# Patient Record
Sex: FEMALE | Race: BLACK OR AFRICAN AMERICAN | Employment: OTHER | URBAN - METROPOLITAN AREA
[De-identification: names, ages, dates, MRNs, and addresses within clinical notes are randomized per-mention and may not be internally consistent; named-entity substitution may affect disease eponyms.]

---

## 2017-01-23 ENCOUNTER — OFFICE VISIT (OUTPATIENT)
Dept: ENDOCRINOLOGY | Age: 82
End: 2017-01-23

## 2017-01-23 VITALS
HEART RATE: 57 BPM | DIASTOLIC BLOOD PRESSURE: 74 MMHG | HEIGHT: 66 IN | WEIGHT: 188.3 LBS | BODY MASS INDEX: 30.26 KG/M2 | SYSTOLIC BLOOD PRESSURE: 167 MMHG

## 2017-01-23 DIAGNOSIS — I10 ESSENTIAL HYPERTENSION WITH GOAL BLOOD PRESSURE LESS THAN 130/80: ICD-10-CM

## 2017-01-23 DIAGNOSIS — E78.5 HYPERLIPIDEMIA, UNSPECIFIED HYPERLIPIDEMIA TYPE: ICD-10-CM

## 2017-01-23 DIAGNOSIS — Z79.4 TYPE 2 DIABETES MELLITUS WITH STAGE 4 CHRONIC KIDNEY DISEASE, WITH LONG-TERM CURRENT USE OF INSULIN (HCC): Primary | ICD-10-CM

## 2017-01-23 DIAGNOSIS — N18.4 TYPE 2 DIABETES MELLITUS WITH STAGE 4 CHRONIC KIDNEY DISEASE, WITH LONG-TERM CURRENT USE OF INSULIN (HCC): Primary | ICD-10-CM

## 2017-01-23 DIAGNOSIS — E11.22 TYPE 2 DIABETES MELLITUS WITH STAGE 4 CHRONIC KIDNEY DISEASE, WITH LONG-TERM CURRENT USE OF INSULIN (HCC): Primary | ICD-10-CM

## 2017-01-23 RX ORDER — WARFARIN SODIUM 5 MG/1
5 TABLET ORAL
COMMUNITY

## 2017-01-23 RX ORDER — TRIFLUOPERAZINE HYDROCHLORIDE 2 MG/1
2 TABLET, FILM COATED ORAL 2 TIMES DAILY
COMMUNITY
End: 2019-09-16

## 2017-01-23 RX ORDER — ALLOPURINOL 100 MG/1
100 TABLET ORAL DAILY
COMMUNITY

## 2017-01-23 RX ORDER — TRIFLUOPERAZINE HYDROCHLORIDE 1 MG/1
1 TABLET, FILM COATED ORAL 2 TIMES DAILY
COMMUNITY
End: 2019-09-16

## 2017-01-23 RX ORDER — AMLODIPINE BESYLATE 10 MG/1
10 TABLET ORAL DAILY
COMMUNITY

## 2017-01-23 NOTE — PATIENT INSTRUCTIONS

## 2017-01-23 NOTE — PROGRESS NOTES
CONSULTATION REQUESTED BY: Stacey Dinh MD     REASON FOR CONSULT:  Uncontrolled type 2 diabetes    CHIEF COMPLAINT: Blood glucose is high    HISTORY OF PRESENT ILLNESS:   Michelle Fitch is a 80 y.o. female with a PMHx as noted below who was referred to our endocrinology clinic for evaluation of uncontrolled type 2 diabetes. Patient is referred to us by her provider at her nursing facility, [facility]. She presents today with her son. Diabetes History:  Diabetes was diagnosed just over 10 years ago. Family History of diabetes is positive in her father. Last A1c prior to initial visit was 7.4% as of 1/17/17   Regimen at time of establishing care includes:      - Lantus 10 units at bedtime      - Januvia 50mg once daily     -  Pravastatin 20mg      - coreg / amlodipine      - ergocalciferol for D deficiency, 50,000 units     Review of home glucose:  AM    106-140 range  Lunch  Dinner  Bedtime     160-200    Patient notes that she had uncontrolled diabetes last year and so she was referred for this assessment. PAST MEDICAL/SURGICAL HISTORY:   Past Medical History   Diagnosis Date    Alzheimer's disease     [facility]est[facility]) [facility] [address]     Dementia     Diabetes ([facility] [address]      type II    Elevated troponin 12/11/2015    Essential hypertension     Fall as cause of accidental injury in residential institution as place of occurrence 12/11/2015    Heart failure ([facility] [address]     Hyperlipidemia     Hypoglycemia     Ill-defined condition      embolism (DVT)    Other ill-defined conditions(799.89)      hyperlipidemia    Other ill-defined conditions(799.89)      GERD    Paroxysmal atrial fibrillation (HCC)     Psychiatric disorder      anxiety     No past surgical history on file.     ALLERGIES:   Allergies   Allergen Reactions    Influenza Virus Vaccine, Specific Hives    Penicillins Unknown (comments)    Sulfacetamide Unknown (comments)       MEDICATIONS ON ADMISSION: Current Outpatient Prescriptions:     allopurinol (ZYLOPRIM) 100 mg tablet, Take  by mouth daily. , Disp: , Rfl:     amLODIPine (NORVASC) 10 mg tablet, Take  by mouth daily. , Disp: , Rfl:     trifluoperazine (STELAZINE) 1 mg tablet, Take 1 mg by mouth two (2) times a day., Disp: , Rfl:     trifluoperazine (STELAZINE) 2 mg tablet, Take 2 mg by mouth two (2) times a day., Disp: , Rfl:     warfarin (COUMADIN) 5 mg tablet, Take 5 mg by mouth daily. , Disp: , Rfl:     carvedilol (COREG) 6.25 mg tablet, Take 0.5 Tabs by mouth two (2) times daily (with meals). , Disp: , Rfl:     insulin glargine (LANTUS) 100 unit/mL injection, 10 Units by SubCUTAneous route nightly., Disp: 1 Vial, Rfl: 0    warfarin (COUMADIN) 7.5 mg tablet, Take 0.5 Tabs by mouth every evening., Disp: , Rfl:     Cholecalciferol, Vitamin D3, (VITAMIN D3) 1,000 unit cap, Take 2 Tabs by mouth daily. , Disp: , Rfl:     ferrous sulfate (IRON) 325 mg (65 mg iron) EC tablet, Take 325 mg by mouth Before breakfast and dinner., Disp: , Rfl:     docusate sodium (COLACE) 100 mg capsule, Take 100 mg by mouth daily. , Disp: , Rfl:     TORSEMIDE (DEMADEX PO), Take 50 mg by mouth daily. , Disp: , Rfl:     polyethylene glycol (MIRALAX) 17 gram packet, Take 17 g by mouth as needed. , Disp: , Rfl:     benztropine (COGENTIN) 1 mg tablet, Take 1 mg by mouth two (2) times a day., Disp: , Rfl:     albuterol-ipratropium (DUONEB) 2.5 mg-0.5 mg/3 ml nebulizer solution, 3 mL by Nebulization route every four (4) hours as needed for Wheezing., Disp: , Rfl:     sitaGLIPtin (JANUVIA) 100 mg tablet, Take 100 mg by mouth daily. , Disp: , Rfl:     memantine ER (NAMENDA XR) 28 mg capsule, Take 28 mg by mouth daily. , Disp: , Rfl:     potassium chloride (K-DUR, KLOR-CON) 20 mEq tablet, Take 20 mEq by mouth daily. , Disp: , Rfl:     pravastatin (PRAVACHOL) 20 mg tablet, Take 20 mg by mouth nightly., Disp: , Rfl:     omeprazole (PRILOSEC) 20 mg capsule, Take 20 mg by mouth daily., Disp: , Rfl:     SOCIAL HISTORY:   Social History     Social History    Marital status:      Spouse name: N/A    Number of children: N/A    Years of education: N/A     Occupational History    Not on file. Social History Main Topics    Smoking status: Never Smoker    Smokeless tobacco: Not on file    Alcohol use No    Drug use: No    Sexual activity: Not on file     Other Topics Concern    Not on file     Social History Narrative       FAMILY HISTORY:  Diabetes in father    REVIEW OF SYSTEMS: Complete ROS assessed and noted for that which is described above, all else are negative. Eyes: normal  ENT: normal  CVS: normal  Resp: normal  GI: normal  : normal  GYN: normal  Endocrine: normal  Integument: normal  Musculoskeletal: normal  Neuro: normal  Psych: normal      PHYSICAL EXAMINATION:    VITAL SIGNS:  Visit Vitals    /74 (BP 1 Location: Left arm, BP Patient Position: Sitting)    Pulse (!) 57    Ht 5' 6\" (1.676 m)    Wt 188 lb 4.8 oz (85.4 kg)    BMI 30.39 kg/m2       GENERAL: NCAT, Sitting comfortably, NAD  EYES: EOMI, non-icteric, no proptosis  Ear/Nose/Throat: NCAT, no inflammation, no masses  LYMPH NODES: No LAD  CARDIOVASCULAR: S1 S2, RRR, No murmur, 2+ radial pulses  RESPIRATORY: CTA b/l, no wheeze/rales  GASTROINTESTINAL: obese, NT, ND  MUSCULOSKELETAL: Normal ROM, no atrophy  SKIN: warm, no edema/rash/ or other skin changes  NEUROLOGIC: 5/5 power all extremities, no tremor, AAOx3  PSYCHIATRIC: Normal affect, Normal insight and judgement          REVIEW OF LABORATORY AND RADIOLOGY DATA:   Labs and documentation have been reviewed as described above. ASSESSMENT AND PLAN:   Mila Vaughan is a 80 y.o. female with a PMHx as noted above who was referred to our endocrinology clinic for evaluation of uncontrolled type 2 diabetes.      Problems:  Type 2 diabetes Uncontrolled  Hyperlipidemia  Hypertension    We had the pleasure of reviewing together the basics of diabetes including basic pathophysiology and diabetes care. We further discussed the importance of checking home glucose regularly and taking all of their scheduled medications in order to have the best possible outcome. I was able to answer any questions they had in clinic today and they are invited to reach me if they have any further questions. Additionally we discussed the role of diabetes treatment in the elderly population, along with the consequences of over treatment. With respect to age and co-morbidities, we customize the HbA1c target to the desired goal which is to be consistent with any assessment of risks and benefits. The risks of over-treatment in the elderly population includes falls, head trauma, fractures, etc and there is typically not much evidence for tight control in this group of patients. Consistent with this, is the patients current HbA1c level which is 7.4% and an acceptable range for her. She is to continue to receive appropriate BP control for her hypertension to reduce the risk of stroke. According to her son she suffers from some dementia, however she is ambulating with help, able to communicate, and so suffering a stroke could prove to change her lifestyle hereafter significantly . I have advised them that it would be ok to continue with the current regimen including blood sugar checks twice daily. Patient is also noted for a history of vitamin D deficiency and an elevated PTH per records from 3/20/16, and likely a consequence of calcium deficiency. She is on vitamin d regularly and I have explained the importance of good bone health with keeping her calcium and vitamin D in the normal range to avoid fractures especially after any falls. PLAN  Type 2 Diabetes (At PERSONAL GOAL)  Medications:  Lantus 10 units at bedtime  Januvia 50mg daily, to continue dosing per renal function, will check a CMP today.   Advised to check glucose AM and bedtime as currently doing  Nursing facility document completed and provided back to her son    Labs: FLP, CMP, urine microalbumin today    Eyes: Advised updated eye exam report faxed to our office for review. Denies any retinopathy. Kidney: GFR/Urine microalbumin as discussed. HTN: BP stable on current medications, no changes today. Monitor and adjust accordingly. HLD: Fasting lipids to be obtained/reviewed. Continues on pravastatin 20mg daily. RTC: I would like to see them back in 6 months.     >60 minutes spent together with patient today of which >50% of this time was spent in counseling and coordination of care. Marilyn Hood.  [id number] [facility]

## 2017-01-23 NOTE — MR AVS SNAPSHOT
Visit Information Date & Time Provider Department Dept. Phone Encounter #  
 1/23/2017  2:50 PM Van Banks, [address] [place] [address] Diabetes and Endocrinology [phone] [id number] Follow-up Instructions Return in about 6 months (around 7/23/2017). Upcoming Health Maintenance Date Due  
 LIPID PANEL Q1 11/7/1927 FOOT EXAM Q1 11/7/1937 MICROALBUMIN Q1 11/7/1937 EYE EXAM RETINAL OR DILATED Q1 11/7/1937 DTaP/Tdap/Td series (1 - Tdap) 11/7/1948 ZOSTER VACCINE AGE 60> 11/7/1987 GLAUCOMA SCREENING Q2Y 11/7/1992 OSTEOPOROSIS SCREENING (DEXA) 11/7/1992 Pneumococcal 65+ Low/Medium Risk (1 of 2 - PCV13) 11/7/1992 MEDICARE YEARLY EXAM 11/7/1992 HEMOGLOBIN A1C Q6M 7/14/2016 INFLUENZA AGE 9 TO ADULT 8/1/2016 Allergies as of 1/23/2017  Review Complete On: 1/23/2017 By: Van Banks MD  
  
 Severity Noted Reaction Type Reactions Influenza Virus Vaccine, Specific  01/23/2017    Hives Penicillins  08/07/2014   Side Effect Unknown (comments) Sulfacetamide  08/07/2014   Side Effect Unknown (comments) Current Immunizations  Reviewed on 12/12/2015 No immunizations on file. Not reviewed this visit You Were Diagnosed With   
  
 Codes Comments Type 2 diabetes mellitus with stage 4 chronic kidney disease, with long-term current use of insulin (HCC)    -  Primary ICD-10-CM: E11.22, N18.4, Z79.4 ICD-9-CM: 250.40, 585.4, V58.67 Essential hypertension with goal blood pressure less than 130/80     ICD-10-CM: I10 
ICD-9-CM: 401.9 Hyperlipidemia, unspecified hyperlipidemia type     ICD-10-CM: E78.5 ICD-9-CM: 272.4 Vitals BP Pulse Height(growth percentile) Weight(growth percentile) BMI OB Status 167/74 (BP 1 Location: Left arm, BP Patient Position: Sitting) (!) 57 5' 6\" (1.676 m) 188 lb 4.8 oz (85.4 kg) 30.39 kg/m2 Postmenopausal  
 Smoking Status Never Smoker BMI and BSA Data Body Mass Index Body Surface Area  
 30.39 kg/m 2 1.99 m 2 Your Updated Medication List  
  
   
This list is accurate as of: 1/23/17  3:27 PM.  Always use your most recent med list.  
  
  
  
  
 allopurinol 100 mg tablet Commonly known as:  Vipul Morales Take  by mouth daily. amLODIPine 10 mg tablet Commonly known as:  Mayberry Guilherme Take  by mouth daily. benztropine 1 mg tablet Commonly known as:  COGENTIN Take 1 mg by mouth two (2) times a day. COLACE 100 mg capsule Generic drug:  docusate sodium Take 100 mg by mouth daily. COREG 6.25 mg tablet Generic drug:  carvedilol Take 0.5 Tabs by mouth two (2) times daily (with meals). * warfarin 5 mg tablet Commonly known as:  COUMADIN Take 5 mg by mouth daily. * COUMADIN 7.5 mg tablet Generic drug:  warfarin Take 0.5 Tabs by mouth every evening. DEMADEX PO Take 50 mg by mouth daily. DUONEB 2.5 mg-0.5 mg/3 ml Nebu Generic drug:  albuterol-ipratropium 3 mL by Nebulization route every four (4) hours as needed for Wheezing. ferrous sulfate 325 mg (65 mg iron) EC tablet Commonly known as:  IRON Take 325 mg by mouth Before breakfast and dinner. insulin glargine 100 unit/mL injection Commonly known as:  LANTUS  
10 Units by SubCUTAneous route nightly. JANUVIA 100 mg tablet Generic drug:  SITagliptin Take 100 mg by mouth daily. MIRALAX 17 gram packet Generic drug:  polyethylene glycol Take 17 g by mouth as needed. NAMENDA XR 28 mg capsule Generic drug:  memantine ER Take 28 mg by mouth daily. potassium chloride 20 mEq tablet Commonly known as:  K-DUR, KLOR-CON Take 20 mEq by mouth daily. pravastatin 20 mg tablet Commonly known as:  PRAVACHOL Take 20 mg by mouth nightly. PriLOSEC 20 mg capsule Generic drug:  omeprazole Take 20 mg by mouth daily. * trifluoperazine 1 mg tablet Commonly known as:  STELAZINE  
 Take 1 mg by mouth two (2) times a day. * trifluoperazine 2 mg tablet Commonly known as:  STELAZINE Take 2 mg by mouth two (2) times a day. VITAMIN D3 1,000 unit Cap Generic drug:  cholecalciferol Take 2 Tabs by mouth daily. * Notice: This list has 4 medication(s) that are the same as other medications prescribed for you. Read the directions carefully, and ask your doctor or other care provider to review them with you. We Performed the Following LIPID PANEL [52378 CPT(R)] METABOLIC PANEL, COMPREHENSIVE [63652 CPT(R)] MICROALBUMIN, UR, RAND W/ MICROALBUMIN/CREA RATIO [id number] CPT(R)] Follow-up Instructions Return in about 6 months (around 7/23/2017). Patient Instructions Learning About Diabetes Food Guidelines Your Care Instructions Meal planning is important to manage diabetes. It helps keep your blood sugar at a target level (which you set with your doctor). You don't have to eat special foods. You can eat what your family eats, including sweets once in a while. But you do have to pay attention to how often you eat and how much you eat of certain foods. You may want to work with a dietitian or a certified diabetes educator (CDE) to help you plan meals and snacks. A dietitian or CDE can also help you lose weight if that is one of your goals. What should you know about eating carbs? Managing the amount of carbohydrate (carbs) you eat is an important part of healthy meals when you have diabetes. Carbohydrate is found in many foods. · Learn which foods have carbs. And learn the amounts of carbs in different foods. ¨ Bread, cereal, pasta, and rice have about 15 grams of carbs in a serving. A serving is 1 slice of bread (1 ounce), ½ cup of cooked cereal, or 1/3 cup of cooked pasta or rice. ¨ Fruits have 15 grams of carbs in a serving.  A serving is 1 small fresh fruit, such as an apple or orange; ½ of a banana; ½ cup of cooked or canned fruit; ½ cup of fruit juice; 1 cup of melon or raspberries; or 2 tablespoons of dried fruit. ¨ Milk and no-sugar-added yogurt have 15 grams of carbs in a serving. A serving is 1 cup of milk or 2/3 cup of no-sugar-added yogurt. ¨ Starchy vegetables have 15 grams of carbs in a serving. A serving is ½ cup of mashed potatoes or sweet potato; 1 cup winter squash; ½ of a small baked potato; ½ cup of cooked beans; or ½ cup cooked corn or green peas. · Learn how much carbs to eat each day and at each meal. A dietitian or CDE can teach you how to keep track of the amount of carbs you eat. This is called carbohydrate counting. · If you are not sure how to count carbohydrate grams, use the Plate Method to plan meals. It is a good, quick way to make sure that you have a balanced meal. It also helps you spread carbs throughout the day. ¨ Divide your plate by types of foods. Put non-starchy vegetables on half the plate, meat or other protein food on one-quarter of the plate, and a grain or starchy vegetable in the final quarter of the plate. To this you can add a small piece of fruit and 1 cup of milk or yogurt, depending on how many carbs you are supposed to eat at a meal. 
· Try to eat about the same amount of carbs at each meal. Do not \"save up\" your daily allowance of carbs to eat at one meal. 
· Proteins have very little or no carbs per serving. Examples of proteins are beef, chicken, turkey, fish, eggs, tofu, cheese, cottage cheese, and peanut butter. A serving size of meat is 3 ounces, which is about the size of a deck of cards. Examples of meat substitute serving sizes (equal to 1 ounce of meat) are 1/4 cup of cottage cheese, 1 egg, 1 tablespoon of peanut butter, and ½ cup of tofu. How can you eat out and still eat healthy? · Learn to estimate the serving sizes of foods that have carbohydrate. If you measure food at home, it will be easier to estimate the amount in a serving of restaurant food. · If the meal you order has too much carbohydrate (such as potatoes, corn, or baked beans), ask to have a low-carbohydrate food instead. Ask for a salad or green vegetables. · If you use insulin, check your blood sugar before and after eating out to help you plan how much to eat in the future. · If you eat more carbohydrate at a meal than you had planned, take a walk or do other exercise. This will help lower your blood sugar. What else should you know? · Limit saturated fat, such as the fat from meat and dairy products. This is a healthy choice because people who have diabetes are at higher risk of heart disease. So choose lean cuts of meat and nonfat or low-fat dairy products. Use olive or canola oil instead of butter or shortening when cooking. · Don't skip meals. Your blood sugar may drop too low if you skip meals and take insulin or certain medicines for diabetes. · Check with your doctor before you drink alcohol. Alcohol can cause your blood sugar to drop too low. Alcohol can also cause a bad reaction if you take certain diabetes medicines. Follow-up care is a key part of your treatment and safety. Be sure to make and go to all appointments, and call your doctor if you are having problems. It's also a good idea to know your test results and keep a list of the medicines you take. Where can you learn more? Go to http://manjeet-ruby.info/. Enter E816 in the search box to learn more about \"Learning About Diabetes Food Guidelines. \" Current as of: May 23, 2016 Content Version: 11.1 © 0158-3445 [employer]. Care instructions adapted under license by [employer] (which disclaims liability or warranty for this information). If you have questions about a medical condition or this instruction, always ask your healthcare professional. [address] any warranty or liability for your use of this information. Learning About Diabetes Food Guidelines Your Care Instructions Meal planning is important to manage diabetes. It helps keep your blood sugar at a target level (which you set with your doctor). You don't have to eat special foods. You can eat what your family eats, including sweets once in a while. But you do have to pay attention to how often you eat and how much you eat of certain foods. You may want to work with a dietitian or a certified diabetes educator (CDE) to help you plan meals and snacks. A dietitian or CDE can also help you lose weight if that is one of your goals. What should you know about eating carbs? Managing the amount of carbohydrate (carbs) you eat is an important part of healthy meals when you have diabetes. Carbohydrate is found in many foods. · Learn which foods have carbs. And learn the amounts of carbs in different foods. ¨ Bread, cereal, pasta, and rice have about 15 grams of carbs in a serving. A serving is 1 slice of bread (1 ounce), ½ cup of cooked cereal, or 1/3 cup of cooked pasta or rice. ¨ Fruits have 15 grams of carbs in a serving. A serving is 1 small fresh fruit, such as an apple or orange; ½ of a banana; ½ cup of cooked or canned fruit; ½ cup of fruit juice; 1 cup of melon or raspberries; or 2 tablespoons of dried fruit. ¨ Milk and no-sugar-added yogurt have 15 grams of carbs in a serving. A serving is 1 cup of milk or 2/3 cup of no-sugar-added yogurt. ¨ Starchy vegetables have 15 grams of carbs in a serving. A serving is ½ cup of mashed potatoes or sweet potato; 1 cup winter squash; ½ of a small baked potato; ½ cup of cooked beans; or ½ cup cooked corn or green peas. · Learn how much carbs to eat each day and at each meal. A dietitian or CDE can teach you how to keep track of the amount of carbs you eat. This is called carbohydrate counting.  
· If you are not sure how to count carbohydrate grams, use the Plate Method to plan meals. It is a good, quick way to make sure that you have a balanced meal. It also helps you spread carbs throughout the day. ¨ Divide your plate by types of foods. Put non-starchy vegetables on half the plate, meat or other protein food on one-quarter of the plate, and a grain or starchy vegetable in the final quarter of the plate. To this you can add a small piece of fruit and 1 cup of milk or yogurt, depending on how many carbs you are supposed to eat at a meal. 
· Try to eat about the same amount of carbs at each meal. Do not \"save up\" your daily allowance of carbs to eat at one meal. 
· Proteins have very little or no carbs per serving. Examples of proteins are beef, chicken, turkey, fish, eggs, tofu, cheese, cottage cheese, and peanut butter. A serving size of meat is 3 ounces, which is about the size of a deck of cards. Examples of meat substitute serving sizes (equal to 1 ounce of meat) are 1/4 cup of cottage cheese, 1 egg, 1 tablespoon of peanut butter, and ½ cup of tofu. How can you eat out and still eat healthy? · Learn to estimate the serving sizes of foods that have carbohydrate. If you measure food at home, it will be easier to estimate the amount in a serving of restaurant food. · If the meal you order has too much carbohydrate (such as potatoes, corn, or baked beans), ask to have a low-carbohydrate food instead. Ask for a salad or green vegetables. · If you use insulin, check your blood sugar before and after eating out to help you plan how much to eat in the future. · If you eat more carbohydrate at a meal than you had planned, take a walk or do other exercise. This will help lower your blood sugar. What else should you know? · Limit saturated fat, such as the fat from meat and dairy products. This is a healthy choice because people who have diabetes are at higher risk of heart disease.  So choose lean cuts of meat and nonfat or low-fat dairy products. Use olive or canola oil instead of butter or shortening when cooking. · Don't skip meals. Your blood sugar may drop too low if you skip meals and take insulin or certain medicines for diabetes. · Check with your doctor before you drink alcohol. Alcohol can cause your blood sugar to drop too low. Alcohol can also cause a bad reaction if you take certain diabetes medicines. Follow-up care is a key part of your treatment and safety. Be sure to make and go to all appointments, and call your doctor if you are having problems. It's also a good idea to know your test results and keep a list of the medicines you take. Where can you learn more? Go to http://manjeet-ruby.info/. Enter K701 in the search box to learn more about \"Learning About Diabetes Food Guidelines. \" Current as of: May 23, 2016 Content Version: 11.1 © 9965-0613 [employer]. Care instructions adapted under license by [employer] (which disclaims liability or warranty for this information). If you have questions about a medical condition or this instruction, always ask your healthcare professional. [address] any warranty or liability for your use of this information. Introducing [facility]! Luis Alberto Raymond introduces appMobi patient portal. Now you can access parts of your medical record, email your doctor's office, and request medication refills online. 1. In your internet browser, go to [email or web address]. [address] [email or web address] 2. Click on the First Time User? Click Here link in the Sign In box. You will see the New Member Sign Up page. 3. Enter your appMobi Access Code exactly as it appears below. You will not need to use this code after youve completed the sign-up process. If you do not sign up before the expiration date, you must request a new code. · appMobi Access Code: [email or web address] Expires: 4/23/2017  3:26 PM 
 
 4. Enter the last four digits of your Social Security Number (xxxx) and Date of Birth (mm/dd/yyyy) as indicated and click Submit. You will be taken to the next sign-up page. 5. Create a [employer] ID. This will be your [employer] login ID and cannot be changed, so think of one that is secure and easy to remember. 6. Create a [employer] password. You can change your password at any time. 7. Enter your Password Reset Question and Answer. This can be used at a later time if you forget your password. 8. Enter your e-mail address. You will receive e-mail notification when new information is available in [address] 9. Click Sign Up. You can now view and download portions of your medical record. 10. Click the Download Summary menu link to download a portable copy of your medical information. If you have questions, please visit the Frequently Asked Questions section of the [employer] website. Remember, [employer] is NOT to be used for urgent needs. For medical emergencies, dial 911. Now available from your iPhone and Android! Please provide this summary of care documentation to your next provider. Your primary care clinician is listed as Gayla Valenzuela. If you have any questions after today's visit, please call [phone].

## 2017-01-24 LAB
ALBUMIN SERPL-MCNC: 4.2 G/DL (ref 3.5–4.7)
ALBUMIN/GLOB SERPL: 1.3 {RATIO} (ref 1.1–2.5)
ALP SERPL-CCNC: 104 IU/L (ref 39–117)
ALT SERPL-CCNC: 13 IU/L (ref 0–32)
AST SERPL-CCNC: 14 IU/L (ref 0–40)
BILIRUB SERPL-MCNC: 0.2 MG/DL (ref 0–1.2)
BUN SERPL-MCNC: 30 MG/DL (ref 8–27)
BUN/CREAT SERPL: 22 (ref 11–26)
CALCIUM SERPL-MCNC: 8.6 MG/DL (ref 8.7–10.3)
CHLORIDE SERPL-SCNC: 102 MMOL/L (ref 96–106)
CHOLEST SERPL-MCNC: 198 MG/DL (ref 100–199)
CO2 SERPL-SCNC: 28 MMOL/L (ref 18–29)
CREAT SERPL-MCNC: 1.38 MG/DL (ref 0.57–1)
CREAT UR-MCNC: NORMAL MG/DL
GLOBULIN SER CALC-MCNC: 3.3 G/DL (ref 1.5–4.5)
GLUCOSE SERPL-MCNC: 138 MG/DL (ref 65–99)
HDLC SERPL-MCNC: 55 MG/DL
INTERPRETATION, 910389: NORMAL
INTERPRETATION: NORMAL
LDLC SERPL CALC-MCNC: 116 MG/DL (ref 0–99)
MICROALBUMIN UR-MCNC: NORMAL
POTASSIUM SERPL-SCNC: 4.2 MMOL/L (ref 3.5–5.2)
PROT SERPL-MCNC: 7.5 G/DL (ref 6–8.5)
SODIUM SERPL-SCNC: 145 MMOL/L (ref 134–144)
TRIGL SERPL-MCNC: 136 MG/DL (ref 0–149)
VLDLC SERPL CALC-MCNC: 27 MG/DL (ref 5–40)

## 2017-01-24 NOTE — PROGRESS NOTES
Labs are stable and appropriate for age. Januvia at 50mg ok for now with stable kidney function. Can reassess on future visit.

## 2019-09-16 ENCOUNTER — HOSPITAL ENCOUNTER (INPATIENT)
Age: 84
LOS: 4 days | Discharge: NURSING, REHAB OR LONG-TERM CARE | DRG: 481 | End: 2019-09-20
Attending: EMERGENCY MEDICINE | Admitting: INTERNAL MEDICINE
Payer: MEDICARE

## 2019-09-16 ENCOUNTER — APPOINTMENT (OUTPATIENT)
Dept: GENERAL RADIOLOGY | Age: 84
DRG: 481 | End: 2019-09-16
Attending: EMERGENCY MEDICINE
Payer: MEDICARE

## 2019-09-16 ENCOUNTER — HOSPITAL ENCOUNTER (OUTPATIENT)
Dept: GENERAL RADIOLOGY | Age: 84
Discharge: HOME | DRG: 481 | End: 2019-09-16
Payer: MEDICARE

## 2019-09-16 ENCOUNTER — ANESTHESIA EVENT (OUTPATIENT)
Dept: SURGERY | Age: 84
DRG: 481 | End: 2019-09-16
Payer: MEDICARE
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
## 2019-09-16 DIAGNOSIS — M25.551 ACUTE RIGHT HIP PAIN: ICD-10-CM

## 2019-09-16 DIAGNOSIS — R50.9 FEVER, UNSPECIFIED FEVER CAUSE: ICD-10-CM

## 2019-09-16 DIAGNOSIS — S72.001A CLOSED FRACTURE OF RIGHT HIP, INITIAL ENCOUNTER (HCC): Primary | ICD-10-CM
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
## 2019-09-16 LAB
ABO + RH BLD: NORMAL
ALBUMIN SERPL-MCNC: 2.5 G/DL (ref 3.5–5)
ALBUMIN/GLOB SERPL: 0.5 {RATIO} (ref 1.1–2.2)
ALP SERPL-CCNC: 86 U/L (ref 45–117)
ALT SERPL-CCNC: 16 U/L (ref 12–78)
ANION GAP SERPL CALC-SCNC: 8 MMOL/L (ref 5–15)
AST SERPL-CCNC: 15 U/L (ref 15–37)
BASOPHILS # BLD: 0 K/UL (ref 0–0.1)
BASOPHILS NFR BLD: 0 % (ref 0–1)
BILIRUB SERPL-MCNC: 0.5 MG/DL (ref 0.2–1)
BLOOD GROUP ANTIBODIES SERPL: NORMAL
BUN SERPL-MCNC: 34 MG/DL (ref 6–20)
BUN/CREAT SERPL: 21 (ref 12–20)
CALCIUM SERPL-MCNC: 8.7 MG/DL (ref 8.5–10.1)
CHLORIDE SERPL-SCNC: 99 MMOL/L (ref 97–108)
CO2 SERPL-SCNC: 29 MMOL/L (ref 21–32)
CREAT SERPL-MCNC: 1.63 MG/DL (ref 0.55–1.02)
DIFFERENTIAL METHOD BLD: ABNORMAL
EOSINOPHIL # BLD: 0 K/UL (ref 0–0.4)
EOSINOPHIL NFR BLD: 0 % (ref 0–7)
ERYTHROCYTE [DISTWIDTH] IN BLOOD BY AUTOMATED COUNT: 15.9 % (ref 11.5–14.5)
GLOBULIN SER CALC-MCNC: 4.8 G/DL (ref 2–4)
GLUCOSE BLD STRIP.AUTO-MCNC: 277 MG/DL (ref 65–100)
GLUCOSE SERPL-MCNC: 350 MG/DL (ref 65–100)
HCT VFR BLD AUTO: 37.6 % (ref 35–47)
HGB BLD-MCNC: 11.7 G/DL (ref 11.5–16)
IMM GRANULOCYTES # BLD AUTO: 0.1 K/UL (ref 0–0.04)
IMM GRANULOCYTES NFR BLD AUTO: 1 % (ref 0–0.5)
INR PPP: 1.2 (ref 0.9–1.1)
LYMPHOCYTES # BLD: 0.6 K/UL (ref 0.8–3.5)
LYMPHOCYTES NFR BLD: 7 % (ref 12–49)
MCH RBC QN AUTO: 23.9 PG (ref 26–34)
MCHC RBC AUTO-ENTMCNC: 31.1 G/DL (ref 30–36.5)
MCV RBC AUTO: 76.9 FL (ref 80–99)
MONOCYTES # BLD: 0.7 K/UL (ref 0–1)
MONOCYTES NFR BLD: 8 % (ref 5–13)
NEUTS SEG # BLD: 7.5 K/UL (ref 1.8–8)
NEUTS SEG NFR BLD: 84 % (ref 32–75)
NRBC # BLD: 0 K/UL (ref 0–0.01)
NRBC BLD-RTO: 0 PER 100 WBC
PLATELET # BLD AUTO: 173 K/UL (ref 150–400)
PMV BLD AUTO: 12.2 FL (ref 8.9–12.9)
POTASSIUM SERPL-SCNC: 3.8 MMOL/L (ref 3.5–5.1)
PROT SERPL-MCNC: 7.3 G/DL (ref 6.4–8.2)
PROTHROMBIN TIME: 12 SEC (ref 9–11.1)
RBC # BLD AUTO: 4.89 M/UL (ref 3.8–5.2)
RBC MORPH BLD: ABNORMAL
RBC MORPH BLD: ABNORMAL
SERVICE CMNT-IMP: ABNORMAL
SODIUM SERPL-SCNC: 136 MMOL/L (ref 136–145)
SPECIMEN EXP DATE BLD: NORMAL
WBC # BLD AUTO: 8.9 K/UL (ref 3.6–11)

## 2019-09-16 PROCEDURE — 74011250637 HC RX REV CODE- 250/637: Performed by: INTERNAL MEDICINE

## 2019-09-16 PROCEDURE — 74011636637 HC RX REV CODE- 636/637: Performed by: INTERNAL MEDICINE

## 2019-09-16 PROCEDURE — 36415 COLL VENOUS BLD VENIPUNCTURE: CPT

## 2019-09-16 PROCEDURE — 73502 X-RAY EXAM HIP UNI 2-3 VIEWS: CPT

## 2019-09-16 PROCEDURE — 99284 EMERGENCY DEPT VISIT MOD MDM: CPT

## 2019-09-16 PROCEDURE — 65270000029 HC RM PRIVATE

## 2019-09-16 PROCEDURE — 80053 COMPREHEN METABOLIC PANEL: CPT

## 2019-09-16 PROCEDURE — 85025 COMPLETE CBC W/AUTO DIFF WBC: CPT

## 2019-09-16 PROCEDURE — 96375 TX/PRO/DX INJ NEW DRUG ADDON: CPT

## 2019-09-16 PROCEDURE — 85610 PROTHROMBIN TIME: CPT

## 2019-09-16 PROCEDURE — 93005 ELECTROCARDIOGRAM TRACING: CPT

## 2019-09-16 PROCEDURE — 96374 THER/PROPH/DIAG INJ IV PUSH: CPT

## 2019-09-16 PROCEDURE — 82962 GLUCOSE BLOOD TEST: CPT

## 2019-09-16 PROCEDURE — 74011250636 HC RX REV CODE- 250/636: Performed by: EMERGENCY MEDICINE

## 2019-09-16 PROCEDURE — 71045 X-RAY EXAM CHEST 1 VIEW: CPT

## 2019-09-16 PROCEDURE — 86900 BLOOD TYPING SEROLOGIC ABO: CPT

## 2019-09-16 RX ORDER — FLUPHENAZINE HYDROCHLORIDE 1 MG/1
2 TABLET ORAL
Status: DISCONTINUED | OUTPATIENT
Start: 2019-09-16 | End: 2019-09-20 | Stop reason: HOSPADM

## 2019-09-16 RX ORDER — INSULIN GLARGINE 100 [IU]/ML
26 INJECTION, SOLUTION SUBCUTANEOUS
COMMUNITY
End: 2020-01-16

## 2019-09-16 RX ORDER — NALOXONE HYDROCHLORIDE 0.4 MG/ML
0.4 INJECTION, SOLUTION INTRAMUSCULAR; INTRAVENOUS; SUBCUTANEOUS AS NEEDED
Status: DISCONTINUED | OUTPATIENT
Start: 2019-09-16 | End: 2019-09-20 | Stop reason: HOSPADM

## 2019-09-16 RX ORDER — PANTOPRAZOLE SODIUM 40 MG/1
40 TABLET, DELAYED RELEASE ORAL DAILY
COMMUNITY

## 2019-09-16 RX ORDER — FLUPHENAZINE HYDROCHLORIDE 1 MG/1
1 TABLET ORAL DAILY
Status: DISCONTINUED | OUTPATIENT
Start: 2019-09-17 | End: 2019-09-20 | Stop reason: HOSPADM

## 2019-09-16 RX ORDER — SODIUM CHLORIDE 0.9 % (FLUSH) 0.9 %
5-40 SYRINGE (ML) INJECTION EVERY 8 HOURS
Status: DISCONTINUED | OUTPATIENT
Start: 2019-09-16 | End: 2019-09-20 | Stop reason: HOSPADM

## 2019-09-16 RX ORDER — HEPARIN SODIUM 5000 [USP'U]/ML
5000 INJECTION, SOLUTION INTRAVENOUS; SUBCUTANEOUS EVERY 8 HOURS
Status: DISCONTINUED | OUTPATIENT
Start: 2019-09-16 | End: 2019-09-20 | Stop reason: HOSPADM

## 2019-09-16 RX ORDER — PRAVASTATIN SODIUM 10 MG/1
20 TABLET ORAL
Status: DISCONTINUED | OUTPATIENT
Start: 2019-09-16 | End: 2019-09-20 | Stop reason: HOSPADM

## 2019-09-16 RX ORDER — ALLOPURINOL 100 MG/1
100 TABLET ORAL DAILY
Status: DISCONTINUED | OUTPATIENT
Start: 2019-09-17 | End: 2019-09-20 | Stop reason: HOSPADM

## 2019-09-16 RX ORDER — MEMANTINE HYDROCHLORIDE 10 MG/1
10 TABLET ORAL EVERY 12 HOURS
Status: DISCONTINUED | OUTPATIENT
Start: 2019-09-16 | End: 2019-09-20 | Stop reason: HOSPADM

## 2019-09-16 RX ORDER — ACETAMINOPHEN 325 MG/1
650 TABLET ORAL
Status: DISCONTINUED | OUTPATIENT
Start: 2019-09-16 | End: 2019-09-20 | Stop reason: HOSPADM

## 2019-09-16 RX ORDER — INSULIN LISPRO 100 [IU]/ML
INJECTION, SOLUTION INTRAVENOUS; SUBCUTANEOUS
Status: DISCONTINUED | OUTPATIENT
Start: 2019-09-16 | End: 2019-09-18

## 2019-09-16 RX ORDER — CARVEDILOL 3.12 MG/1
3.12 TABLET ORAL 2 TIMES DAILY WITH MEALS
Status: DISCONTINUED | OUTPATIENT
Start: 2019-09-16 | End: 2019-09-20 | Stop reason: HOSPADM

## 2019-09-16 RX ORDER — ONDANSETRON 2 MG/ML
4 INJECTION INTRAMUSCULAR; INTRAVENOUS
Status: DISCONTINUED | OUTPATIENT
Start: 2019-09-16 | End: 2019-09-20 | Stop reason: HOSPADM

## 2019-09-16 RX ORDER — OXYCODONE AND ACETAMINOPHEN 5; 325 MG/1; MG/1
1 TABLET ORAL
Status: DISCONTINUED | OUTPATIENT
Start: 2019-09-16 | End: 2019-09-17

## 2019-09-16 RX ORDER — ACETAMINOPHEN 325 MG/1
650 TABLET ORAL
COMMUNITY

## 2019-09-16 RX ORDER — TORSEMIDE 20 MG/1
100 TABLET ORAL DAILY
Status: DISCONTINUED | OUTPATIENT
Start: 2019-09-17 | End: 2019-09-20 | Stop reason: HOSPADM

## 2019-09-16 RX ORDER — TORSEMIDE 100 MG/1
100 TABLET ORAL DAILY
COMMUNITY

## 2019-09-16 RX ORDER — DEXTROSE 50 % IN WATER (D50W) INTRAVENOUS SYRINGE
25-50 AS NEEDED
Status: DISCONTINUED | OUTPATIENT
Start: 2019-09-16 | End: 2019-09-16

## 2019-09-16 RX ORDER — INSULIN GLARGINE 100 [IU]/ML
6 INJECTION, SOLUTION SUBCUTANEOUS
Status: DISCONTINUED | OUTPATIENT
Start: 2019-09-16 | End: 2019-09-19

## 2019-09-16 RX ORDER — FLUPHENAZINE HYDROCHLORIDE 1 MG/1
1 TABLET ORAL DAILY
COMMUNITY
End: 2020-01-16

## 2019-09-16 RX ORDER — BENZTROPINE MESYLATE 1 MG/1
1 TABLET ORAL 2 TIMES DAILY
Status: DISCONTINUED | OUTPATIENT
Start: 2019-09-16 | End: 2019-09-20 | Stop reason: HOSPADM

## 2019-09-16 RX ORDER — MORPHINE SULFATE 4 MG/ML
4 INJECTION INTRAVENOUS ONCE
Status: COMPLETED | OUTPATIENT
Start: 2019-09-16 | End: 2019-09-16

## 2019-09-16 RX ORDER — SODIUM CHLORIDE 0.9 % (FLUSH) 0.9 %
5-40 SYRINGE (ML) INJECTION AS NEEDED
Status: DISCONTINUED | OUTPATIENT
Start: 2019-09-16 | End: 2019-09-20 | Stop reason: HOSPADM

## 2019-09-16 RX ORDER — POTASSIUM CHLORIDE 1.5 G/1.77G
20 POWDER, FOR SOLUTION ORAL DAILY
Status: DISCONTINUED | OUTPATIENT
Start: 2019-09-17 | End: 2019-09-20 | Stop reason: HOSPADM

## 2019-09-16 RX ORDER — HYDROMORPHONE HYDROCHLORIDE 1 MG/ML
0.5 INJECTION, SOLUTION INTRAMUSCULAR; INTRAVENOUS; SUBCUTANEOUS
Status: DISCONTINUED | OUTPATIENT
Start: 2019-09-16 | End: 2019-09-20 | Stop reason: HOSPADM

## 2019-09-16 RX ORDER — BISACODYL 5 MG
5 TABLET, DELAYED RELEASE (ENTERIC COATED) ORAL DAILY PRN
Status: DISCONTINUED | OUTPATIENT
Start: 2019-09-16 | End: 2019-09-20 | Stop reason: HOSPADM

## 2019-09-16 RX ORDER — AMLODIPINE BESYLATE 5 MG/1
10 TABLET ORAL DAILY
Status: DISCONTINUED | OUTPATIENT
Start: 2019-09-17 | End: 2019-09-20 | Stop reason: HOSPADM

## 2019-09-16 RX ORDER — FLUPHENAZINE HYDROCHLORIDE 1 MG/1
1 TABLET ORAL
COMMUNITY
End: 2020-01-16

## 2019-09-16 RX ORDER — LANOLIN ALCOHOL/MO/W.PET/CERES
325 CREAM (GRAM) TOPICAL 2 TIMES DAILY WITH MEALS
Status: DISCONTINUED | OUTPATIENT
Start: 2019-09-17 | End: 2019-09-20 | Stop reason: HOSPADM

## 2019-09-16 RX ORDER — ONDANSETRON 2 MG/ML
4 INJECTION INTRAMUSCULAR; INTRAVENOUS
Status: COMPLETED | OUTPATIENT
Start: 2019-09-16 | End: 2019-09-16

## 2019-09-16 RX ORDER — MAGNESIUM SULFATE 100 %
4 CRYSTALS MISCELLANEOUS AS NEEDED
Status: DISCONTINUED | OUTPATIENT
Start: 2019-09-16 | End: 2019-09-20 | Stop reason: HOSPADM

## 2019-09-16 RX ADMIN — MORPHINE SULFATE 4 MG: 4 INJECTION INTRAVENOUS at 14:03

## 2019-09-16 RX ADMIN — INSULIN GLARGINE 6 UNITS: 100 INJECTION, SOLUTION SUBCUTANEOUS at 21:43

## 2019-09-16 RX ADMIN — Medication 10 ML: at 21:08

## 2019-09-16 RX ADMIN — PRAVASTATIN SODIUM 20 MG: 10 TABLET ORAL at 21:03

## 2019-09-16 RX ADMIN — INSULIN LISPRO 3 UNITS: 100 INJECTION, SOLUTION INTRAVENOUS; SUBCUTANEOUS at 21:43

## 2019-09-16 RX ADMIN — BENZTROPINE MESYLATE 1 MG: 1 TABLET ORAL at 21:03

## 2019-09-16 RX ADMIN — OXYCODONE AND ACETAMINOPHEN 1 TABLET: 5; 325 TABLET ORAL at 21:04

## 2019-09-16 RX ADMIN — MEMANTINE HYDROCHLORIDE 10 MG: 10 TABLET ORAL at 21:03

## 2019-09-16 RX ADMIN — FLUPHENAZINE HYDROCHLORIDE 2 MG: 1 TABLET, FILM COATED ORAL at 21:03

## 2019-09-16 RX ADMIN — Medication 10 ML: at 21:44

## 2019-09-16 RX ADMIN — ONDANSETRON 4 MG: 2 INJECTION INTRAMUSCULAR; INTRAVENOUS at 14:02

## 2019-09-16 NOTE — ED PROVIDER NOTES
EMERGENCY DEPARTMENT HISTORY AND PHYSICAL EXAM      Date: 9/16/2019  Patient Name: Felisha Shelby  Patient Age and Sex: 80 y.o. female     History of Presenting Illness     Chief Complaint   Patient presents with    Hip Pain       History Provided By: Patient and sons    HPI: Felisha Shelby Is a pleasant 80-year-old female with past medical history of hypertension and heart failure presenting today with known hip fracture. Patient reportedly fell on Thursday, and then stepped out of the wheelchair yesterday. She was having severe pain in the right hip and was sent for x-ray and found to have a right hip fracture. The patient has no complaints at this time although she appears to have pain with transfer. There are no other complaints, changes, or physical findings at this time. PCP: Shonna Bermudez MD    No current facility-administered medications on file prior to encounter. Current Outpatient Medications on File Prior to Encounter   Medication Sig Dispense Refill    allopurinol (ZYLOPRIM) 100 mg tablet Take  by mouth daily.  amLODIPine (NORVASC) 10 mg tablet Take  by mouth daily.  trifluoperazine (STELAZINE) 1 mg tablet Take 1 mg by mouth two (2) times a day.  trifluoperazine (STELAZINE) 2 mg tablet Take 2 mg by mouth two (2) times a day.  warfarin (COUMADIN) 5 mg tablet Take 5 mg by mouth daily.  carvedilol (COREG) 6.25 mg tablet Take 0.5 Tabs by mouth two (2) times daily (with meals).  insulin glargine (LANTUS) 100 unit/mL injection 10 Units by SubCUTAneous route nightly. 1 Vial 0    warfarin (COUMADIN) 7.5 mg tablet Take 0.5 Tabs by mouth every evening.  Cholecalciferol, Vitamin D3, (VITAMIN D3) 1,000 unit cap Take 2 Tabs by mouth daily.  ferrous sulfate (IRON) 325 mg (65 mg iron) EC tablet Take 325 mg by mouth Before breakfast and dinner.  docusate sodium (COLACE) 100 mg capsule Take 100 mg by mouth daily.       TORSEMIDE (DEMADEX PO) Take 50 mg by mouth daily.  polyethylene glycol (MIRALAX) 17 gram packet Take 17 g by mouth as needed.  benztropine (COGENTIN) 1 mg tablet Take 1 mg by mouth two (2) times a day.  albuterol-ipratropium (DUONEB) 2.5 mg-0.5 mg/3 ml nebulizer solution 3 mL by Nebulization route every four (4) hours as needed for Wheezing.  sitaGLIPtin (JANUVIA) 100 mg tablet Take 100 mg by mouth daily.  memantine ER (NAMENDA XR) 28 mg capsule Take 28 mg by mouth daily.  potassium chloride (K-DUR, KLOR-CON) 20 mEq tablet Take 20 mEq by mouth daily.  pravastatin (PRAVACHOL) 20 mg tablet Take 20 mg by mouth nightly.  omeprazole (PRILOSEC) 20 mg capsule Take 20 mg by mouth daily. Past History     Past Medical History:  Past Medical History:   Diagnosis Date    Alzheimer's disease     [facility]     Dementia     Diabetes ([facility]     type II    Elevated troponin 12/11/2015    Essential hypertension     Fall as cause of accidental injury in residential institution as place of occurrence 12/11/2015    Heart failure ([facility]     Hyperlipidemia     Hypoglycemia     Ill-defined condition     embolism (DVT)    Other ill-defined conditions(799.89)     hyperlipidemia    Other ill-defined conditions(799.89)     GERD    Paroxysmal atrial fibrillation (HCC)     Psychiatric disorder     anxiety       Past Surgical History:  No past surgical history on file. Family History:  No family history on file. Social History:  Social History     Tobacco Use    Smoking status: Never Smoker   Substance Use Topics    Alcohol use: No    Drug use: No       Allergies: Allergies   Allergen Reactions    Influenza Virus Vaccine, Specific Hives    Penicillins Unknown (comments)    Sulfacetamide Unknown (comments)         Review of Systems   Constitutional: No  fever,  No  headache  Skin: No  rash, No  jaundice  HEENT: No  nasal congestion, No  eye drainage.    Resp: No cough,  No wheezing  CV: No chest pain, No  palpitations  GI: No vomiting,  No  diarrhea.,  No  constipation  : No dysuria,  No  hematuria  MSK: + joint pain,  +  trauma  Neuro: No numbness, No  tingling  Psych: No suicidal, No  paranoid      Physical Exam   No data found. General: alert, No acute distress  Eyes: EOMI, normal conjunctiva  ENT: moist mucous membranes. Neck: Active, full ROM of neck. Skin: No rashes. no jaundice              Lungs: Equal chest expansion. no respiratory distress. Heart: regular rate     no peripheral edema    Abd:  non distended soft  Back: Full ROM  MSK: Right leg is externally rotated and foreshortened, tender to palpation at this area  Neuro: alert  Person, Place, Time and Situation; normal speech;   Psych: Cooperative with exam; Appropriate mood and affect             Diagnostic Study Results     Labs -     Recent Results (from the past 12 hour(s))   TYPE & SCREEN    Collection Time: 09/16/19  1:04 PM   Result Value Ref Range    Crossmatch Expiration 09/19/2019     ABO/Rh(D) B POSITIVE     Antibody screen NEG    CBC WITH AUTOMATED DIFF    Collection Time: 09/16/19  1:04 PM   Result Value Ref Range    WBC 8.9 3.6 - 11.0 K/uL    RBC 4.89 3.80 - 5.20 M/uL    HGB 11.7 11.5 - 16.0 g/dL    HCT 37.6 35.0 - 47.0 %    MCV 76.9 (L) 80.0 - 99.0 FL    MCH 23.9 (L) 26.0 - 34.0 PG    MCHC 31.1 30.0 - 36.5 g/dL    RDW 15.9 (H) 11.5 - 14.5 %    PLATELET 511 203 - 124 K/uL    MPV 12.2 8.9 - 12.9 FL    NRBC 0.0 0  WBC    ABSOLUTE NRBC 0.00 0.00 - 0.01 K/uL    NEUTROPHILS 84 (H) 32 - 75 %    LYMPHOCYTES 7 (L) 12 - 49 %    MONOCYTES 8 5 - 13 %    EOSINOPHILS 0 0 - 7 %    BASOPHILS 0 0 - 1 %    IMMATURE GRANULOCYTES 1 (H) 0.0 - 0.5 %    ABS. NEUTROPHILS 7.5 1.8 - 8.0 K/UL    ABS. LYMPHOCYTES 0.6 (L) 0.8 - 3.5 K/UL    ABS. MONOCYTES 0.7 0.0 - 1.0 K/UL    ABS. EOSINOPHILS 0.0 0.0 - 0.4 K/UL    ABS. BASOPHILS 0.0 0.0 - 0.1 K/UL    ABS. IMM.  GRANS. 0.1 (H) 0.00 - 0.04 K/UL    DF AUTOMATED      RBC COMMENTS HYPOCHROMIA  PRESENT        RBC COMMENTS ANISOCYTOSIS  1+       METABOLIC PANEL, COMPREHENSIVE    Collection Time: 09/16/19  1:45 PM   Result Value Ref Range    Sodium 136 136 - 145 mmol/L    Potassium 3.8 3.5 - 5.1 mmol/L    Chloride 99 97 - 108 mmol/L    CO2 29 21 - 32 mmol/L    Anion gap 8 5 - 15 mmol/L    Glucose 350 (H) 65 - 100 mg/dL    BUN 34 (H) 6 - 20 MG/DL    Creatinine 1.63 (H) 0.55 - 1.02 MG/DL    BUN/Creatinine ratio 21 (H) 12 - 20      GFR est AA 36 (L) >60 ml/min/1.73m2    GFR est non-AA 30 (L) >60 ml/min/1.73m2    Calcium 8.7 8.5 - 10.1 MG/DL    Bilirubin, total 0.5 0.2 - 1.0 MG/DL    ALT (SGPT) 16 12 - 78 U/L    AST (SGOT) 15 15 - 37 U/L    Alk. phosphatase 86 45 - 117 U/L    Protein, total 7.3 6.4 - 8.2 g/dL    Albumin 2.5 (L) 3.5 - 5.0 g/dL    Globulin 4.8 (H) 2.0 - 4.0 g/dL    A-G Ratio 0.5 (L) 1.1 - 2.2     EKG, 12 LEAD, INITIAL    Collection Time: 09/16/19  3:44 PM   Result Value Ref Range    Ventricular Rate 94 BPM    Atrial Rate 100 BPM    QRS Duration 78 ms    Q-T Interval 378 ms    QTC Calculation (Bezet) 472 ms    Calculated R Axis 10 degrees    Calculated T Axis 44 degrees    Diagnosis       Atrial fibrillation with a competing junctional pacemaker  Voltage criteria for left ventricular hypertrophy  When compared with ECG of 11-DEC-2015 00:46,  Atrial fibrillation has replaced Sinus rhythm  Vent. rate has increased BY  48 BPM  T wave amplitude has decreased in Lateral leads         Radiologic Studies -   XR CHEST PORT    (Results Pending)     CT Results  (Last 48 hours)    None        CXR Results  (Last 48 hours)               09/16/19 1224  XR CHEST SNGL V Final result    Impression:  IMPRESSION: No airspace disease or other acute abnormality. Marked cardiomegaly. Narrative:  EXAM:  XR CHEST SNGL V   INDICATION: Fever, unspecified   COMPARISON: 12/11/2015       FINDINGS:    An AP radiograph of the chest was obtained. Lines: The patient is on a cardiac monitor. Lungs: The lungs are clear. Pleura: There is no pneumothorax or pleural effusion. Mediastinum: Cardiac silhouette is markedly enlarged and the aorta is   atherosclerotic. Sameera Never Bones and soft tissues: The bones and soft tissues are within normal limits. Medical Decision Making     Differential Diagnosis: Hip fracture, contusion, electrolyte abnormality    I reviewed the vital signs, available nursing notes, past medical history, past surgical history, family history and social history and old medical records. On my interpretation, Laboratory workup is significant for unremarkable CBC, electro lites remarkable for creatinine 1.63, hyperglycemic 350  On my interpretation of the radiology studies intertrochanteric fracture of the right hip  On my interpretation of the EKG atrial fibrillation at a ventricular rate of 94, QTc is 472,    Management/ED course: Patient presents today with a known right hip fracture. I spoke with on-call orthopedics who will evaluate the patient. Ultimately patient is admitted to the medicine service for medical management and stabilization prior to possible surgery. ED Course:   Initial assessment performed. The patients presenting problems have been discussed, and they are in agreement with the care plan formulated and outlined with them. I have encouraged them to ask questions as they arise throughout their visit. Procedures:  0    Disposition: Admitted    Admission Note:  Patient is being admitted to the hospital by Service: Hospitalist.  The results of their tests and reasons for their admission have been discussed with them and available family. They convey agreement and understanding for the need to be admitted and for their admission diagnosis. Diagnosis     Clinical Impression:   1.  Closed fracture of right hip, initial encounter [facility])        Attestations:  Erna Velásquez MD        Please note that this dictation was completed with Dragon, the computer voice recognition software. Quite often unanticipated grammatical, syntax, homophones, and other interpretive errors are inadvertently transcribed by the computer software. Please disregard these errors. Please excuse any errors that have escaped final proofreading. Thank you.

## 2019-09-16 NOTE — PROGRESS NOTES
Pharmacy Clarification of Prior to Admission Medication Regimen     The patient was not interviewed regarding clarification of the prior to admission medication regimen. Patient was transferred from [address] [place] [address], to [id number] [place] [address]. Pharmacy called [address] [place] [address], [phone], and had a med list faxed to [id number] [place] [address]. [facility] called [address] [place] [address] a second time, and spoke with CATRACHO Xavier, who was able to verify the patient's last administered doses. Information Obtained From: transfer papers    Pertinent Pharmacy Findings:  Updated patients preferred outpatient pharmacy to: [facility] did not update the outpatient pharmacy due to the patient living at a SNF  Identified High Alert Medication Information  Current Anticoagulants:  Name: warfarin (COUMADIN)    PTA medication list was corrected to the following:     Prior to Admission Medications   Prescriptions Last Dose Informant Patient Reported? Taking? SITagliptin (JANUVIA) 50 mg tablet 9/16/2019 at 0800 Transfer Papers Yes Yes   Sig: Take 50 mg by mouth daily. acetaminophen (TYLENOL) 325 mg tablet 9/16/2019 at 1029 Transfer Papers Yes Yes   Sig: Take 650 mg by mouth every six (6) hours as needed for Pain. allopurinol (ZYLOPRIM) 100 mg tablet 9/16/2019 at 0800 Transfer Papers Yes Yes   Sig: Take 100 mg by mouth daily. amLODIPine (NORVASC) 10 mg tablet 9/16/2019 at 0800 Transfer Papers Yes Yes   Sig: Take 10 mg by mouth daily. benztropine (COGENTIN) 1 mg tablet 9/16/2019 at 0800 Transfer Papers Yes Yes   Sig: Take 1 mg by mouth two (2) times a day. carvedilol (COREG) 3.125 mg tablet 9/16/2019 at 0800 Transfer Papers Yes Yes   Sig: Take 3.125 mg by mouth two (2) times daily (with meals). ferrous sulfate (IRON) 325 mg (65 mg iron) EC tablet 9/16/2019 at 0800 Transfer Papers Yes Yes   Sig: Take 325 mg by mouth Before breakfast and dinner. fluPHENAZine (PROLIXIN) 1 mg tablet 9/16/2019 at 0800 Transfer Papers Yes Yes   Sig: Take 1 mg by mouth daily.  Patient takes 1 mg QAM and 2 mg QHS   fluPHENAZine (PROLIXIN) 1 mg tablet 9/15/2019 at 2000 Transfer Papers Yes Yes   Sig: Take 2 mg by mouth nightly. Patient takes 1 mg QAM and 2 mg QHS   insulin glargine (LANTUS U-100 INSULIN) 100 unit/mL injection 9/15/2019 at 2100 Transfer Papers Yes Yes   Si Units by SubCUTAneous route nightly. memantine ER (NAMENDA XR) 28 mg capsule 2019 at 0800 Transfer Papers Yes Yes   Sig: Take 28 mg by mouth daily. pantoprazole (PROTONIX) 40 mg tablet 2019 at 0600 Transfer Papers Yes Yes   Sig: Take 40 mg by mouth daily. potassium chloride (KAON 10%) 20 mEq/15 mL solution 2019 at 0800 Transfer Papers Yes Yes   Sig: Take 20 mEq by mouth daily. pravastatin (PRAVACHOL) 20 mg tablet 9/15/2019 at 2100 Transfer Papers Yes Yes   Sig: Take 20 mg by mouth nightly. torsemide (DEMADEX) 100 mg tablet 2019 at 0800 Transfer Papers Yes Yes   Sig: Take 100 mg by mouth daily. warfarin (COUMADIN) 5 mg tablet 9/15/2019 at 1800 Transfer Papers Yes Yes   Sig: Take 5 mg by mouth four (4) days a week. Patient takes 5 mg M--F-Blake evenings, and 7.5 mg -- evenings   warfarin (COUMADIN) 7.5 mg tablet 2019 at 1800 Transfer Papers Yes Yes   Sig: Take 7.5 mg by mouth three (3) days a week.  Patient takes 5 mg M--F-Blake evenings, and 7.5 mg --Sa evenings      Facility-Administered Medications: None          Thank you,  Delmi Rodrigues, [facility]  Medication History Pharmacy Technician

## 2019-09-16 NOTE — ED NOTES
TRANSFER - OUT REPORT:    Verbal report given to Janice Stevenson RN on Nickolas Houston  being transferred to [facility] for ordered procedure       Report consisted of patients Situation, Background, Assessment and   Recommendations(SBAR). Information from the following report(s) SBAR, ED Summary, Procedure Summary and MAR was reviewed with the receiving nurse. Opportunity for questions and clarification was provided.

## 2019-09-16 NOTE — ED TRIAGE NOTES
Pt arrives from outpatient radiology with positive R hip fx. R leg ins externally rotated and shortened. Son reports pt initially fell on thurs at nursing home but they are unsure of any details. Pt fell out of wheelchair yesterday. Son reports the nursing home was giving her tylenol for pain.

## 2019-09-16 NOTE — PROGRESS NOTES
Patient is a long term care resident at [facility]. She had a ground level fall x 2 in the last week. It is noted that she is having right hip discomfort and right knee swelling. Xrays obtained of right knee in house showed: Moderate degenerative arthritis medial compartment right knee. Xrays in house of right hip showed FINDINGS:The soft tissues appear unremarkable. There is a small question about the angle of the right femoral neck. I would recommend a full right hip series in different degrees of angulation to evaluate this matter further. Otherwise, the right hip appears within normal limits on the single view. IMPRESSION:  Recommend multiple additional views of the right hip for further evaluation. Will order multiple views of right hip and lateral view to see if fracture is present.

## 2019-09-16 NOTE — PROGRESS NOTES
Patient received from ED to room 3236. Stretcher unable to be placed in higher position to enable patient's transfer to orthopedic bed. Awaiting assistance from ED. On arrival noted that patient had pulled out IV access. Patient oriented X1 only, pleasantly confused at moment. Telemetry monitor placed on patient, notified telemetry room of patients new location. 1845  Patient placed on to ortho bed with the assistance of 2 ED staff. IV site restarted by ED staff. Bed alarm placed under patient and is in working order. Call [phone] with in reach.     Yanick Valdivia RN

## 2019-09-16 NOTE — PROGRESS NOTES
Reason for Admission:   Closed fracture R hip               RRAT Score:     32 high risk             Resources/supports as identified by patient/family:   Has been at [place] rehab the past 5 years, [facility]                Top Challenges facing patient (as identified by patient/family and CM): Finances/Medication cost?      No challenges reported              Transportation? [facility] re[facility] provides transportation              Support system or lack thereof?  family                     Living arrangements? Lives at [place] [address] and rehab           Self-care/ADLs/Cognition? Staff assist with all ADLs, patient currently alert x 2          Current Advanced Directive/Advance Care Plan:  none                          Plan for utilizing home health:    Plan to return to [address] [place] [address]:                  1.  Patient in ED bed waiting for inpatient admission  2. Patient and family prefer for her to return to [place]. [facility] re[facility] at discharge. Patient is a 80year old female admitted 9/16 for closed R hip fracture. Patient alert, mildly confused, resting in bed with 2 sons present in room to assist in answering CM questions. Demographic information verified and correct. Insurance information verified and correct. Patient has been living at [address] [place] [address] the past 5 years, no oxygen, uses a cane for ambulation. Patient receives her medications from facility. Patient's son state [facility] staff assist patient with all ADLs and provide transportation. Care Management Interventions  PCP Verified by CM: Azra Lopez MD)  Mode of Transport at Discharge:  Other (see comment)([facility] provides transportation)  Transition of Care Consult (CM Consult): Discharge Planning  Discharge Durable Medical Equipment: No  Physical Therapy Consult: No  Occupational Therapy Consult: No  Speech Therapy Consult: No  Current Support Network: Nursing Facility(lives at [place]. [facility] past 5 years)  Confirm Follow Up Transport: Other (see comment)  Plan discussed with Pt/Family/Caregiver: Yes  Discharge Location  Discharge Placement: [address]ilee Echevarria RN, [address] [place] [address]  [phone]

## 2019-09-16 NOTE — H&P
Hospitalist Admission Note    NAME: Gavin Grayson   :  1927   MRN:  [id number]     Date/Time:  2019 3:47 PM    Patient PCP: Yony Banks MD  ______________________________________________________________________  Given the patient's current clinical presentation, I have a high level of concern for decompensation if discharged from the emergency department. Complex decision making was performed, which includes reviewing the patient's available past medical records, laboratory results, and x-ray films. My assessment of this patient's clinical condition and my plan of care is as follows. Assessment / Plan:  Right intertrochanteric hip fracture  -ORIF planned as per orthopedics  -Bed rest, analgesics PRN  -Regarding preoperative risk assessment, this is limited. The patient herself is unable to reliably report and symptoms to me, and there is little known history, though chronic combined heart failure is noted on patient's transfer papers and on hospital chart. She does not appear to be overtly in heart failure, but given the limited information, I have ordered an echocardiogram and would suggest not proceeding to surgery until results or additional historical data are available. History of chronic combined systolic and diastolic heart failure  -See above. Echocardiogram ordered. Patient noted to take Torsemide 100 mg daily, suggesting a significant propensity for fluid retention.     Atrial fibrillation, anticoagulated on warfarin  -Hold warfarin for proposed surgery    CKD3  -Creatinine appears to be in baseline range    Diabetes mellitus type II  -Reduce Lantus dose for planned surgery  -SSI    Dementia with behavioral disturbance  -Continue home medications    Code Status: DNR per discussion with patient's family members  Surrogate Decision Maker: sonDong    DVT Prophylaxis: SCD  GI Prophylaxis: not indicated    Baseline: lives in long term care, uses walker or wheelchair for ambulation      Subjective:   CHIEF COMPLAINT: Fall x2, complaint of right hip pain with transferring    HISTORY OF PRESENT ILLNESS:     Zacarias Leal is a 80 y.o.  female who presents with right hip pain. She is a long-term care resident at [facility] and apparently has had two falls there since last Thursday. It is unclear how the falls occurred. In the time since, the patient has complained of pain in the right hip whenever she has been moved. The right lower extremity has been noted to be externally rotated and shortened compared to the left. History is limited as the patient has dementia. At rest on her emergency department stretcher, she denies complaints. She is reported to have a history of diabetes mellitus type II, atrial fibrillation on Coumadin, essential hypertension, combined chronic systolic and diastolic heart failure, chronic kidney disease stage III, and dementia. We were asked to admit for work up and evaluation of the above problems. Past Medical History:   Diagnosis Date    Alzheimer's disease     [facility]     Dementia     Diabetes ([facility]     type II    Elevated troponin 12/11/2015    Essential hypertension     Fall as cause of accidental injury in residential institution as place of occurrence 12/11/2015    Heart failure ([facility]     Hyperlipidemia     Hypoglycemia     Ill-defined condition     embolism (DVT)    Other ill-defined conditions(799.89)     hyperlipidemia    Other ill-defined conditions(799.89)     GERD    Paroxysmal atrial fibrillation (HCC)     Psychiatric disorder     anxiety      Surgical history:  Patient cannot provide history due to dementia. Social History     Tobacco Use    Smoking status: Never Smoker   Substance Use Topics    Alcohol use: No      Family history:  Cancer in son.     Allergies   Allergen Reactions    Influenza Virus Vaccine, Specific Hives    Penicillins Unknown (comments)    Sulfacetamide Unknown (comments)        Prior to Admission medications    Medication Sig Start Date End Date Taking? Authorizing Provider   allopurinol (ZYLOPRIM) 100 mg tablet Take  by mouth daily. Provider, Historical   amLODIPine (NORVASC) 10 mg tablet Take  by mouth daily. Provider, Historical   trifluoperazine (STELAZINE) 1 mg tablet Take 1 mg by mouth two (2) times a day. Provider, Historical   trifluoperazine (STELAZINE) 2 mg tablet Take 2 mg by mouth two (2) times a day. Provider, Historical   warfarin (COUMADIN) 5 mg tablet Take 5 mg by mouth daily. Provider, Historical   carvedilol (COREG) 6.25 mg tablet Take 0.5 Tabs by mouth two (2) times daily (with meals). 12/15/15   Liana Phillips MD   insulin glargine (LANTUS) 100 unit/mL injection 10 Units by SubCUTAneous route nightly. 12/15/15   Liana Phillips MD   warfarin (COUMADIN) 7.5 mg tablet Take 0.5 Tabs by mouth every evening. 12/15/15   Liana Phillips MD   Cholecalciferol, Vitamin D3, (VITAMIN D3) 1,000 unit cap Take 2 Tabs by mouth daily. Doug Dumont MD   ferrous sulfate (IRON) 325 mg (65 mg iron) EC tablet Take 325 mg by mouth Before breakfast and dinner. Doug Dumont MD   docusate sodium (COLACE) 100 mg capsule Take 100 mg by mouth daily. Doug Dumont MD   TORSEMIDE (DEMADEX PO) Take 50 mg by mouth daily. Doug Dumont MD   polyethylene glycol (MIRALAX) 17 gram packet Take 17 g by mouth as needed. Doug Dumont MD   benztropine (COGENTIN) 1 mg tablet Take 1 mg by mouth two (2) times a day. Provider, Historical   albuterol-ipratropium (DUONEB) 2.5 mg-0.5 mg/3 ml nebulizer solution 3 mL by Nebulization route every four (4) hours as needed for Wheezing. Provider, Historical   sitaGLIPtin (JANUVIA) 100 mg tablet Take 100 mg by mouth daily. Provider, Historical   memantine ER (NAMENDA XR) 28 mg capsule Take 28 mg by mouth daily.     Provider, Historical   potassium chloride (K-DUR, KLOR-CON) 20 mEq tablet Take 20 mEq by mouth daily. Provider, Historical   pravastatin (PRAVACHOL) 20 mg tablet Take 20 mg by mouth nightly. Provider, Historical   omeprazole (PRILOSEC) 20 mg capsule Take 20 mg by mouth daily. Provider, Historical       REVIEW OF SYSTEMS:     I am not able to complete the review of systems because: The patient is intubated and sedated    The patient has altered mental status due to his acute medical problems    The patient has baseline aphasia from prior stroke(s)   x The patient has baseline dementia and is not reliable historian    The patient is in acute medical distress and unable to provide information           Total of 12 systems reviewed as follows:       POSITIVE= underlined text  Negative = text not underlined  General:  fever, chills, sweats, generalized weakness, weight loss/gain,      loss of appetite   Eyes:    blurred vision, eye pain, loss of vision, double vision  ENT:    rhinorrhea, pharyngitis   Respiratory:   cough, sputum production, SOB, BUTTS, wheezing, pleuritic pain   Cardiology:   chest pain, palpitations, orthopnea, PND, edema, syncope   Gastrointestinal:  abdominal pain , N/V, diarrhea, dysphagia, constipation, bleeding   Genitourinary:  frequency, urgency, dysuria, hematuria, incontinence   Muskuloskeletal :  arthralgia, myalgia, back pain  Hematology:  easy bruising, nose or gum bleeding, lymphadenopathy   Dermatological: rash, ulceration, pruritis, color change / jaundice  Endocrine:   hot flashes or polydipsia   Neurological:  headache, dizziness, confusion, focal weakness, paresthesia,     Speech difficulties, memory loss, gait difficulty  Psychological: Feelings of anxiety, depression, agitation    Objective:   VITALS:    There were no vitals taken for this visit. PHYSICAL EXAM:    General:    Alert, cooperative, no distress, appears stated age.      HEENT: Atraumatic, anicteric sclerae, pink conjunctivae     No oral ulcers, mucosa dry, throat clear, dentition fair  Neck:  Supple, symmetrical,  thyroid: non tender  Lungs:   Clear to auscultation bilaterally. No Wheezing or Rhonchi. No rales. Chest wall:  No tenderness  No Accessory muscle use. Heart:   Irregularly irregular  rhythm,  No  murmur   No edema  Abdomen:   Soft, non-tender. Not distended. Bowel sounds normal  Extremities: No cyanosis. No clubbing,      Skin turgor normal, Capillary refill normal, Radial dial pulse 2+    Right lower extremity external rotated and shortened. Tender over right hip. Skin:     Not pale. Not Jaundiced  No rashes   Psych:  No insight. Not depressed. Not anxious or agitated. Neurologic: EOMs intact. No facial asymmetry. No aphasia or slurred speech. Symmetrical strength, Sensation grossly intact.     _______________________________________________________________________  Care Plan discussed with:    Comments   Patient x    Family  x Sons at bedside   RN     Care Manager                    Consultant:      _______________________________________________________________________  Expected  Disposition:   Home with Family    HH/PT/OT/RN    SNF/LTC x   [facility]    ________________________________________________________________________  TOTAL TIME: 39  Minutes    Critical Care Provided     Minutes non procedure based      Comments    x Reviewed previous records   >50% of visit spent in counseling and coordination of care x Discussion with patient and/or family and questions answered       ________________________________________________________________________  Signed: Raquel Altamirano MD    Procedures: see electronic medical records for all procedures/Xrays and details which were not copied into this note but were reviewed prior to creation of Plan.     LAB DATA REVIEWED:    Recent Results (from the past 24 hour(s))   TYPE & SCREEN    Collection Time: 09/16/19  1:04 PM   Result Value Ref Range    Crossmatch Expiration 09/19/2019     ABO/Rh(D) B POSITIVE     Antibody screen NEG    CBC WITH AUTOMATED DIFF    Collection Time: 09/16/19  1:04 PM   Result Value Ref Range    WBC 8.9 3.6 - 11.0 K/uL    RBC 4.89 3.80 - 5.20 M/uL    HGB 11.7 11.5 - 16.0 g/dL    HCT 37.6 35.0 - 47.0 %    MCV 76.9 (L) 80.0 - 99.0 FL    MCH 23.9 (L) 26.0 - 34.0 PG    MCHC 31.1 30.0 - 36.5 g/dL    RDW 15.9 (H) 11.5 - 14.5 %    PLATELET 609 000 - 253 K/uL    MPV 12.2 8.9 - 12.9 FL    NRBC 0.0 0  WBC    ABSOLUTE NRBC 0.00 0.00 - 0.01 K/uL    NEUTROPHILS 84 (H) 32 - 75 %    LYMPHOCYTES 7 (L) 12 - 49 %    MONOCYTES 8 5 - 13 %    EOSINOPHILS 0 0 - 7 %    BASOPHILS 0 0 - 1 %    IMMATURE GRANULOCYTES 1 (H) 0.0 - 0.5 %    ABS. NEUTROPHILS 7.5 1.8 - 8.0 K/UL    ABS. LYMPHOCYTES 0.6 (L) 0.8 - 3.5 K/UL    ABS. MONOCYTES 0.7 0.0 - 1.0 K/UL    ABS. EOSINOPHILS 0.0 0.0 - 0.4 K/UL    ABS. BASOPHILS 0.0 0.0 - 0.1 K/UL    ABS. IMM. GRANS. 0.1 (H) 0.00 - 0.04 K/UL    DF AUTOMATED      RBC COMMENTS HYPOCHROMIA  PRESENT        RBC COMMENTS ANISOCYTOSIS  1+       METABOLIC PANEL, COMPREHENSIVE    Collection Time: 09/16/19  1:45 PM   Result Value Ref Range    Sodium 136 136 - 145 mmol/L    Potassium 3.8 3.5 - 5.1 mmol/L    Chloride 99 97 - 108 mmol/L    CO2 29 21 - 32 mmol/L    Anion gap 8 5 - 15 mmol/L    Glucose 350 (H) 65 - 100 mg/dL    BUN 34 (H) 6 - 20 MG/DL    Creatinine 1.63 (H) 0.55 - 1.02 MG/DL    BUN/Creatinine ratio 21 (H) 12 - 20      GFR est AA 36 (L) >60 ml/min/1.73m2    GFR est non-AA 30 (L) >60 ml/min/1.73m2    Calcium 8.7 8.5 - 10.1 MG/DL    Bilirubin, total 0.5 0.2 - 1.0 MG/DL    ALT (SGPT) 16 12 - 78 U/L    AST (SGOT) 15 15 - 37 U/L    Alk.  phosphatase 86 45 - 117 U/L    Protein, total 7.3 6.4 - 8.2 g/dL    Albumin 2.5 (L) 3.5 - 5.0 g/dL    Globulin 4.8 (H) 2.0 - 4.0 g/dL    A-G Ratio 0.5 (L) 1.1 - 2.2

## 2019-09-17 ENCOUNTER — APPOINTMENT (OUTPATIENT)
Dept: NON INVASIVE DIAGNOSTICS | Age: 84
DRG: 481 | End: 2019-09-17
Attending: INTERNAL MEDICINE
Payer: MEDICARE

## 2019-09-17 ENCOUNTER — APPOINTMENT (OUTPATIENT)
Dept: GENERAL RADIOLOGY | Age: 84
DRG: 481 | End: 2019-09-17
Attending: ORTHOPAEDIC SURGERY
Payer: MEDICARE

## 2019-09-17 ENCOUNTER — ANESTHESIA (OUTPATIENT)
Dept: SURGERY | Age: 84
DRG: 481 | End: 2019-09-17
Payer: MEDICARE

## 2019-09-17 PROBLEM — I49.5 SSS (SICK SINUS SYNDROME) (HCC): Status: ACTIVE | Noted: 2019-09-17

## 2019-09-17 LAB
ANION GAP SERPL CALC-SCNC: 5 MMOL/L (ref 5–15)
ATRIAL RATE: 100 BPM
BASOPHILS # BLD: 0 K/UL (ref 0–0.1)
BASOPHILS NFR BLD: 0 % (ref 0–1)
BUN SERPL-MCNC: 44 MG/DL (ref 6–20)
BUN/CREAT SERPL: 23 (ref 12–20)
CALCIUM SERPL-MCNC: 8.6 MG/DL (ref 8.5–10.1)
CALCULATED R AXIS, ECG10: 10 DEGREES
CALCULATED T AXIS, ECG11: 44 DEGREES
CHLORIDE SERPL-SCNC: 101 MMOL/L (ref 97–108)
CO2 SERPL-SCNC: 32 MMOL/L (ref 21–32)
CREAT SERPL-MCNC: 1.91 MG/DL (ref 0.55–1.02)
DIAGNOSIS, 93000: NORMAL
DIFFERENTIAL METHOD BLD: ABNORMAL
ECHO AO ROOT DIAM: 3.43 CM
ECHO AV AREA PEAK VELOCITY: 2.5 CM2
ECHO AV AREA/BSA PEAK VELOCITY: 1.2 CM2/M2
ECHO AV PEAK GRADIENT: 7.9 MMHG
ECHO AV PEAK VELOCITY: 140.38 CM/S
ECHO AV REGURGITANT PHT: 440.9 CM
ECHO EST RA PRESSURE: 3 MMHG
ECHO LA AREA 4C: 23.1 CM2
ECHO LA MAJOR AXIS: 2.61 CM
ECHO LA TO AORTIC ROOT RATIO: 0.76
ECHO LA VOL 2C: 66.43 ML (ref 22–52)
ECHO LA VOL 4C: 68.49 ML (ref 22–52)
ECHO LA VOL BP: 72.57 ML (ref 22–52)
ECHO LV INTERNAL DIMENSION DIASTOLIC: 4.28 CM (ref 3.9–5.3)
ECHO LV INTERNAL DIMENSION SYSTOLIC: 3.27 CM
ECHO LV IVSD: 1.34 CM (ref 0.6–0.9)
ECHO LV IVSS: 1.71 CM
ECHO LV MASS 2D: 250.8 G (ref 67–162)
ECHO LV POSTERIOR WALL DIASTOLIC: 1.31 CM (ref 0.6–0.9)
ECHO LV POSTERIOR WALL SYSTOLIC: 1.74 CM
ECHO LVOT DIAM: 2.1 CM
ECHO LVOT PEAK GRADIENT: 4.1 MMHG
ECHO LVOT PEAK VELOCITY: 100.65 CM/S
ECHO MV AREA PHT: 4 CM2
ECHO MV E DECELERATION TIME (DT): 170.7 MS
ECHO MV E VELOCITY: 96.89 CM/S
ECHO MV PRESSURE HALF TIME (PHT): 55.2 MS
ECHO MV REGURGITANT PEAK GRADIENT: 27.2 MMHG
ECHO MV REGURGITANT PEAK VELOCITY: 260.79 CM/S
ECHO PULMONARY ARTERY SYSTOLIC PRESSURE (PASP): 26 MMHG
ECHO PV MAX VELOCITY: 98.53 CM/S
ECHO PV PEAK GRADIENT: 3.9 MMHG
ECHO RA AREA 4C: 15.81 CM2
ECHO RIGHT VENTRICULAR SYSTOLIC PRESSURE (RVSP): 21.9 MMHG
ECHO RV INTERNAL DIMENSION: 3.85 CM
ECHO TV REGURGITANT MAX VELOCITY: 217.64 CM/S
ECHO TV REGURGITANT PEAK GRADIENT: 18.9 MMHG
EOSINOPHIL # BLD: 0.1 K/UL (ref 0–0.4)
EOSINOPHIL NFR BLD: 1 % (ref 0–7)
ERYTHROCYTE [DISTWIDTH] IN BLOOD BY AUTOMATED COUNT: 16 % (ref 11.5–14.5)
GLUCOSE BLD STRIP.AUTO-MCNC: 128 MG/DL (ref 65–100)
GLUCOSE BLD STRIP.AUTO-MCNC: 143 MG/DL (ref 65–100)
GLUCOSE BLD STRIP.AUTO-MCNC: 164 MG/DL (ref 65–100)
GLUCOSE BLD STRIP.AUTO-MCNC: 188 MG/DL (ref 65–100)
GLUCOSE BLD STRIP.AUTO-MCNC: 222 MG/DL (ref 65–100)
GLUCOSE SERPL-MCNC: 254 MG/DL (ref 65–100)
HCT VFR BLD AUTO: 35.6 % (ref 35–47)
HGB BLD-MCNC: 10.9 G/DL (ref 11.5–16)
IMM GRANULOCYTES # BLD AUTO: 0 K/UL (ref 0–0.04)
IMM GRANULOCYTES NFR BLD AUTO: 0 % (ref 0–0.5)
INR PPP: 1.2 (ref 0.9–1.1)
LYMPHOCYTES # BLD: 1.1 K/UL (ref 0.8–3.5)
LYMPHOCYTES NFR BLD: 12 % (ref 12–49)
MCH RBC QN AUTO: 23.9 PG (ref 26–34)
MCHC RBC AUTO-ENTMCNC: 30.6 G/DL (ref 30–36.5)
MCV RBC AUTO: 78.1 FL (ref 80–99)
MONOCYTES # BLD: 1 K/UL (ref 0–1)
MONOCYTES NFR BLD: 11 % (ref 5–13)
NEUTS SEG # BLD: 6.8 K/UL (ref 1.8–8)
NEUTS SEG NFR BLD: 76 % (ref 32–75)
NRBC # BLD: 0 K/UL (ref 0–0.01)
NRBC BLD-RTO: 0 PER 100 WBC
PISA AR MAX VEL: 387.2 CM/S
PLATELET # BLD AUTO: 172 K/UL (ref 150–400)
PMV BLD AUTO: 12.3 FL (ref 8.9–12.9)
POTASSIUM SERPL-SCNC: 3.7 MMOL/L (ref 3.5–5.1)
PROTHROMBIN TIME: 11.8 SEC (ref 9–11.1)
Q-T INTERVAL, ECG07: 378 MS
QRS DURATION, ECG06: 78 MS
QTC CALCULATION (BEZET), ECG08: 472 MS
RBC # BLD AUTO: 4.56 M/UL (ref 3.8–5.2)
SERVICE CMNT-IMP: ABNORMAL
SODIUM SERPL-SCNC: 138 MMOL/L (ref 136–145)
VENTRICULAR RATE, ECG03: 94 BPM
WBC # BLD AUTO: 8.9 K/UL (ref 3.6–11)

## 2019-09-17 PROCEDURE — 82962 GLUCOSE BLOOD TEST: CPT

## 2019-09-17 PROCEDURE — 85610 PROTHROMBIN TIME: CPT

## 2019-09-17 PROCEDURE — 77030026438 HC STYL ET INTUB CARD -A: Performed by: NURSE ANESTHETIST, CERTIFIED REGISTERED

## 2019-09-17 PROCEDURE — C1713 ANCHOR/SCREW BN/BN,TIS/BN: HCPCS | Performed by: ORTHOPAEDIC SURGERY

## 2019-09-17 PROCEDURE — 77030019908 HC STETH ESOPH SIMS -A: Performed by: NURSE ANESTHETIST, CERTIFIED REGISTERED

## 2019-09-17 PROCEDURE — 74011636637 HC RX REV CODE- 636/637: Performed by: INTERNAL MEDICINE

## 2019-09-17 PROCEDURE — 77030020782 HC GWN BAIR PAWS FLX 3M -B

## 2019-09-17 PROCEDURE — 72170 X-RAY EXAM OF PELVIS: CPT

## 2019-09-17 PROCEDURE — 74011250636 HC RX REV CODE- 250/636

## 2019-09-17 PROCEDURE — 0QS604Z REPOSITION RIGHT UPPER FEMUR WITH INTERNAL FIXATION DEVICE, OPEN APPROACH: ICD-10-PCS | Performed by: ORTHOPAEDIC SURGERY

## 2019-09-17 PROCEDURE — 93306 TTE W/DOPPLER COMPLETE: CPT

## 2019-09-17 PROCEDURE — 77030018729 HC ELECTRD DEFIB PAD CARD -B: Performed by: ORTHOPAEDIC SURGERY

## 2019-09-17 PROCEDURE — 74011250636 HC RX REV CODE- 250/636: Performed by: NURSE ANESTHETIST, CERTIFIED REGISTERED

## 2019-09-17 PROCEDURE — 76060000033 HC ANESTHESIA 1 TO 1.5 HR: Performed by: ORTHOPAEDIC SURGERY

## 2019-09-17 PROCEDURE — 74011000250 HC RX REV CODE- 250

## 2019-09-17 PROCEDURE — 77030018836 HC SOL IRR NACL ICUM -A: Performed by: ORTHOPAEDIC SURGERY

## 2019-09-17 PROCEDURE — 94760 N-INVAS EAR/PLS OXIMETRY 1: CPT

## 2019-09-17 PROCEDURE — 80048 BASIC METABOLIC PNL TOTAL CA: CPT

## 2019-09-17 PROCEDURE — 76000 FLUOROSCOPY <1 HR PHYS/QHP: CPT

## 2019-09-17 PROCEDURE — 77030036660

## 2019-09-17 PROCEDURE — 65270000029 HC RM PRIVATE

## 2019-09-17 PROCEDURE — 76210000006 HC OR PH I REC 0.5 TO 1 HR: Performed by: ORTHOPAEDIC SURGERY

## 2019-09-17 PROCEDURE — 74011250636 HC RX REV CODE- 250/636: Performed by: INTERNAL MEDICINE

## 2019-09-17 PROCEDURE — 77030040361 HC SLV COMPR DVT MDII -B: Performed by: ORTHOPAEDIC SURGERY

## 2019-09-17 PROCEDURE — 74011250637 HC RX REV CODE- 250/637: Performed by: INTERNAL MEDICINE

## 2019-09-17 PROCEDURE — 77030008684 HC TU ET CUF COVD -B: Performed by: NURSE ANESTHETIST, CERTIFIED REGISTERED

## 2019-09-17 PROCEDURE — 85025 COMPLETE CBC W/AUTO DIFF WBC: CPT

## 2019-09-17 PROCEDURE — 77030005518 HC CATH URETH FOL 2W BARD -B

## 2019-09-17 PROCEDURE — 74011250636 HC RX REV CODE- 250/636: Performed by: ORTHOPAEDIC SURGERY

## 2019-09-17 PROCEDURE — 73552 X-RAY EXAM OF FEMUR 2/>: CPT

## 2019-09-17 PROCEDURE — 74011000250 HC RX REV CODE- 250: Performed by: NURSE ANESTHETIST, CERTIFIED REGISTERED

## 2019-09-17 PROCEDURE — 77010033678 HC OXYGEN DAILY

## 2019-09-17 PROCEDURE — 36415 COLL VENOUS BLD VENIPUNCTURE: CPT

## 2019-09-17 PROCEDURE — 76010000149 HC OR TIME 1 TO 1.5 HR: Performed by: ORTHOPAEDIC SURGERY

## 2019-09-17 PROCEDURE — 77030031139 HC SUT VCRL2 J&J -A: Performed by: ORTHOPAEDIC SURGERY

## 2019-09-17 PROCEDURE — 77030013079 HC BLNKT BAIR HGGR 3M -A: Performed by: ANESTHESIOLOGY

## 2019-09-17 PROCEDURE — 74011250636 HC RX REV CODE- 250/636: Performed by: ANESTHESIOLOGY

## 2019-09-17 PROCEDURE — 74011250637 HC RX REV CODE- 250/637: Performed by: ORTHOPAEDIC SURGERY

## 2019-09-17 PROCEDURE — 74011250637 HC RX REV CODE- 250/637: Performed by: NURSE PRACTITIONER

## 2019-09-17 PROCEDURE — 77030016474 HC BIT DRL QC3 SYNT -C: Performed by: ORTHOPAEDIC SURGERY

## 2019-09-17 PROCEDURE — C1769 GUIDE WIRE: HCPCS | Performed by: ORTHOPAEDIC SURGERY

## 2019-09-17 PROCEDURE — 77030008467 HC STPLR SKN COVD -B: Performed by: ORTHOPAEDIC SURGERY

## 2019-09-17 DEVICE — NAIL IM L235MM DIA11MM 125DEG SHT GRN R PROX FEM TI: Type: IMPLANTABLE DEVICE | Site: FEMUR | Status: FUNCTIONAL

## 2019-09-17 DEVICE — SCREW BNE L36MM DIA5MM NONSTERILE TIB LT GRN TI ST CANN LOK: Type: IMPLANTABLE DEVICE | Site: FEMUR | Status: FUNCTIONAL

## 2019-09-17 DEVICE — SCREW BNE L100MM DIA10.35MM G TI CANN PERF FOR PROX FEM: Type: IMPLANTABLE DEVICE | Site: FEMUR | Status: FUNCTIONAL

## 2019-09-17 RX ORDER — FENTANYL CITRATE 50 UG/ML
INJECTION, SOLUTION INTRAMUSCULAR; INTRAVENOUS AS NEEDED
Status: DISCONTINUED | OUTPATIENT
Start: 2019-09-17 | End: 2019-09-17 | Stop reason: HOSPADM

## 2019-09-17 RX ORDER — ETOMIDATE 2 MG/ML
INJECTION INTRAVENOUS AS NEEDED
Status: DISCONTINUED | OUTPATIENT
Start: 2019-09-17 | End: 2019-09-17 | Stop reason: HOSPADM

## 2019-09-17 RX ORDER — AMOXICILLIN 250 MG
1 CAPSULE ORAL 2 TIMES DAILY
Status: DISCONTINUED | OUTPATIENT
Start: 2019-09-17 | End: 2019-09-20 | Stop reason: HOSPADM

## 2019-09-17 RX ORDER — SODIUM CHLORIDE 0.9 % (FLUSH) 0.9 %
5-40 SYRINGE (ML) INJECTION EVERY 8 HOURS
Status: DISCONTINUED | OUTPATIENT
Start: 2019-09-17 | End: 2019-09-20 | Stop reason: HOSPADM

## 2019-09-17 RX ORDER — OXYCODONE HYDROCHLORIDE 5 MG/1
5 TABLET ORAL
Status: DISCONTINUED | OUTPATIENT
Start: 2019-09-17 | End: 2019-09-20 | Stop reason: HOSPADM

## 2019-09-17 RX ORDER — SODIUM CHLORIDE 0.9 % (FLUSH) 0.9 %
5-40 SYRINGE (ML) INJECTION EVERY 8 HOURS
Status: DISCONTINUED | OUTPATIENT
Start: 2019-09-17 | End: 2019-09-17 | Stop reason: HOSPADM

## 2019-09-17 RX ORDER — FERROUS SULFATE, DRIED 160(50) MG
1 TABLET, EXTENDED RELEASE ORAL
Status: DISCONTINUED | OUTPATIENT
Start: 2019-09-18 | End: 2019-09-20 | Stop reason: HOSPADM

## 2019-09-17 RX ORDER — LIDOCAINE HYDROCHLORIDE 20 MG/ML
INJECTION, SOLUTION EPIDURAL; INFILTRATION; INTRACAUDAL; PERINEURAL AS NEEDED
Status: DISCONTINUED | OUTPATIENT
Start: 2019-09-17 | End: 2019-09-17 | Stop reason: HOSPADM

## 2019-09-17 RX ORDER — ACETAMINOPHEN 325 MG/1
650 TABLET ORAL EVERY 6 HOURS
Status: DISCONTINUED | OUTPATIENT
Start: 2019-09-17 | End: 2019-09-20 | Stop reason: HOSPADM

## 2019-09-17 RX ORDER — ONDANSETRON 2 MG/ML
INJECTION INTRAMUSCULAR; INTRAVENOUS AS NEEDED
Status: DISCONTINUED | OUTPATIENT
Start: 2019-09-17 | End: 2019-09-17 | Stop reason: HOSPADM

## 2019-09-17 RX ORDER — FENTANYL CITRATE 50 UG/ML
50 INJECTION, SOLUTION INTRAMUSCULAR; INTRAVENOUS AS NEEDED
Status: DISCONTINUED | OUTPATIENT
Start: 2019-09-17 | End: 2019-09-17 | Stop reason: HOSPADM

## 2019-09-17 RX ORDER — SODIUM CHLORIDE 9 MG/ML
125 INJECTION, SOLUTION INTRAVENOUS CONTINUOUS
Status: DISPENSED | OUTPATIENT
Start: 2019-09-17 | End: 2019-09-18

## 2019-09-17 RX ORDER — SODIUM CHLORIDE 0.9 % (FLUSH) 0.9 %
5-40 SYRINGE (ML) INJECTION AS NEEDED
Status: DISCONTINUED | OUTPATIENT
Start: 2019-09-17 | End: 2019-09-20 | Stop reason: HOSPADM

## 2019-09-17 RX ORDER — WARFARIN SODIUM 5 MG/1
5 TABLET ORAL ONCE
Status: COMPLETED | OUTPATIENT
Start: 2019-09-17 | End: 2019-09-17

## 2019-09-17 RX ORDER — SODIUM CHLORIDE 0.9 % (FLUSH) 0.9 %
5-40 SYRINGE (ML) INJECTION AS NEEDED
Status: DISCONTINUED | OUTPATIENT
Start: 2019-09-17 | End: 2019-09-17 | Stop reason: HOSPADM

## 2019-09-17 RX ORDER — HYDROMORPHONE HYDROCHLORIDE 1 MG/ML
.2-.5 INJECTION, SOLUTION INTRAMUSCULAR; INTRAVENOUS; SUBCUTANEOUS
Status: DISCONTINUED | OUTPATIENT
Start: 2019-09-17 | End: 2019-09-17 | Stop reason: HOSPADM

## 2019-09-17 RX ORDER — NALOXONE HYDROCHLORIDE 0.4 MG/ML
0.4 INJECTION, SOLUTION INTRAMUSCULAR; INTRAVENOUS; SUBCUTANEOUS AS NEEDED
Status: DISCONTINUED | OUTPATIENT
Start: 2019-09-17 | End: 2019-09-20 | Stop reason: HOSPADM

## 2019-09-17 RX ORDER — DIPHENHYDRAMINE HYDROCHLORIDE 50 MG/ML
12.5 INJECTION, SOLUTION INTRAMUSCULAR; INTRAVENOUS AS NEEDED
Status: DISCONTINUED | OUTPATIENT
Start: 2019-09-17 | End: 2019-09-17 | Stop reason: HOSPADM

## 2019-09-17 RX ORDER — LIDOCAINE HYDROCHLORIDE 10 MG/ML
0.1 INJECTION, SOLUTION EPIDURAL; INFILTRATION; INTRACAUDAL; PERINEURAL AS NEEDED
Status: DISCONTINUED | OUTPATIENT
Start: 2019-09-17 | End: 2019-09-17 | Stop reason: HOSPADM

## 2019-09-17 RX ORDER — CLINDAMYCIN PHOSPHATE 900 MG/50ML
900 INJECTION INTRAVENOUS EVERY 8 HOURS
Status: COMPLETED | OUTPATIENT
Start: 2019-09-17 | End: 2019-09-18

## 2019-09-17 RX ORDER — FACIAL-BODY WIPES
10 EACH TOPICAL DAILY PRN
Status: DISCONTINUED | OUTPATIENT
Start: 2019-09-19 | End: 2019-09-20 | Stop reason: HOSPADM

## 2019-09-17 RX ORDER — TRAMADOL HYDROCHLORIDE 50 MG/1
50 TABLET ORAL
Status: DISCONTINUED | OUTPATIENT
Start: 2019-09-17 | End: 2019-09-20 | Stop reason: HOSPADM

## 2019-09-17 RX ORDER — SODIUM CHLORIDE 9 MG/ML
25 INJECTION, SOLUTION INTRAVENOUS CONTINUOUS
Status: DISCONTINUED | OUTPATIENT
Start: 2019-09-18 | End: 2019-09-17 | Stop reason: HOSPADM

## 2019-09-17 RX ORDER — POLYETHYLENE GLYCOL 3350 17 G/17G
17 POWDER, FOR SOLUTION ORAL DAILY
Status: DISCONTINUED | OUTPATIENT
Start: 2019-09-18 | End: 2019-09-20 | Stop reason: HOSPADM

## 2019-09-17 RX ORDER — CLINDAMYCIN PHOSPHATE 900 MG/50ML
INJECTION INTRAVENOUS AS NEEDED
Status: DISCONTINUED | OUTPATIENT
Start: 2019-09-17 | End: 2019-09-17 | Stop reason: HOSPADM

## 2019-09-17 RX ORDER — FAMOTIDINE 20 MG/1
20 TABLET, FILM COATED ORAL EVERY 24 HOURS
Status: DISCONTINUED | OUTPATIENT
Start: 2019-09-17 | End: 2019-09-20 | Stop reason: HOSPADM

## 2019-09-17 RX ORDER — SUCCINYLCHOLINE CHLORIDE 20 MG/ML
INJECTION INTRAMUSCULAR; INTRAVENOUS AS NEEDED
Status: DISCONTINUED | OUTPATIENT
Start: 2019-09-17 | End: 2019-09-17 | Stop reason: HOSPADM

## 2019-09-17 RX ORDER — FENTANYL CITRATE 50 UG/ML
25 INJECTION, SOLUTION INTRAMUSCULAR; INTRAVENOUS
Status: DISCONTINUED | OUTPATIENT
Start: 2019-09-17 | End: 2019-09-17 | Stop reason: HOSPADM

## 2019-09-17 RX ORDER — MIDAZOLAM HYDROCHLORIDE 1 MG/ML
0.5 INJECTION, SOLUTION INTRAMUSCULAR; INTRAVENOUS
Status: DISCONTINUED | OUTPATIENT
Start: 2019-09-17 | End: 2019-09-17 | Stop reason: HOSPADM

## 2019-09-17 RX ADMIN — INSULIN LISPRO 3 UNITS: 100 INJECTION, SOLUTION INTRAVENOUS; SUBCUTANEOUS at 09:23

## 2019-09-17 RX ADMIN — PRAVASTATIN SODIUM 20 MG: 10 TABLET ORAL at 21:26

## 2019-09-17 RX ADMIN — BENZTROPINE MESYLATE 1 MG: 1 TABLET ORAL at 21:26

## 2019-09-17 RX ADMIN — Medication 10 ML: at 23:42

## 2019-09-17 RX ADMIN — FLUPHENAZINE HYDROCHLORIDE 2 MG: 1 TABLET, FILM COATED ORAL at 21:27

## 2019-09-17 RX ADMIN — HEPARIN SODIUM 5000 UNITS: 5000 INJECTION INTRAVENOUS; SUBCUTANEOUS at 21:29

## 2019-09-17 RX ADMIN — INSULIN GLARGINE 6 UNITS: 100 INJECTION, SOLUTION SUBCUTANEOUS at 22:57

## 2019-09-17 RX ADMIN — CARVEDILOL 3.12 MG: 3.12 TABLET, FILM COATED ORAL at 18:37

## 2019-09-17 RX ADMIN — HYDROMORPHONE HYDROCHLORIDE 0.5 MG: 1 INJECTION, SOLUTION INTRAMUSCULAR; INTRAVENOUS; SUBCUTANEOUS at 10:11

## 2019-09-17 RX ADMIN — FAMOTIDINE 20 MG: 20 TABLET ORAL at 18:00

## 2019-09-17 RX ADMIN — Medication 10 ML: at 05:30

## 2019-09-17 RX ADMIN — FENTANYL CITRATE 12.5 MCG: 50 INJECTION, SOLUTION INTRAMUSCULAR; INTRAVENOUS at 15:46

## 2019-09-17 RX ADMIN — SUCCINYLCHOLINE CHLORIDE 120 MG: 20 INJECTION, SOLUTION INTRAMUSCULAR; INTRAVENOUS at 14:38

## 2019-09-17 RX ADMIN — ONDANSETRON HYDROCHLORIDE 4 MG: 2 INJECTION, SOLUTION INTRAMUSCULAR; INTRAVENOUS at 15:26

## 2019-09-17 RX ADMIN — CLINDAMYCIN PHOSPHATE 900 MG: 900 INJECTION, SOLUTION INTRAVENOUS at 23:00

## 2019-09-17 RX ADMIN — INSULIN LISPRO 2 UNITS: 100 INJECTION, SOLUTION INTRAVENOUS; SUBCUTANEOUS at 12:17

## 2019-09-17 RX ADMIN — ACETAMINOPHEN 650 MG: 325 TABLET ORAL at 18:36

## 2019-09-17 RX ADMIN — SODIUM CHLORIDE 25 ML/HR: 900 INJECTION, SOLUTION INTRAVENOUS at 13:29

## 2019-09-17 RX ADMIN — FENTANYL CITRATE 25 MCG: 50 INJECTION, SOLUTION INTRAMUSCULAR; INTRAVENOUS at 14:36

## 2019-09-17 RX ADMIN — LIDOCAINE HYDROCHLORIDE 40 MG: 20 INJECTION, SOLUTION EPIDURAL; INFILTRATION; INTRACAUDAL; PERINEURAL at 14:36

## 2019-09-17 RX ADMIN — ETOMIDATE 20 MG: 2 INJECTION, SOLUTION INTRAVENOUS at 14:37

## 2019-09-17 RX ADMIN — Medication 10 ML: at 23:41

## 2019-09-17 RX ADMIN — MEMANTINE HYDROCHLORIDE 10 MG: 10 TABLET ORAL at 21:25

## 2019-09-17 RX ADMIN — WARFARIN SODIUM 5 MG: 5 TABLET ORAL at 19:11

## 2019-09-17 RX ADMIN — CLINDAMYCIN PHOSPHATE 900 MG: 18 INJECTION, SOLUTION INTRAVENOUS at 14:45

## 2019-09-17 NOTE — CONSULTS
[address], [place], [address] [facility]     Date of  Admission: 9/16/2019 12:42 PM     Admission type:Emergency    Consult for: pre op clearance  Consult by: ortho     Subjective:     Rowena Swan is a 80 y.o. female with PMH HTN, HLD, a fib, CKD, dementia, who was admitted for Closed right hip fracture ([address] [S72.001A]. Per ED provider note Rowena Swan presented to the ED with c/o hip pain after a fall a few days ago. Cardiology consulted for pre op clearance. On assessment, Rowena Swan is resting in bed in NAD, slightly sedated from pain medication. She denies chest pain, SOB, palpitations, leg swelling. Family present at bedside. Rowena Swan previously saw Dr. Neli Borrero for cardiology. Last ECHO today with EF 56-60%; mild AI and mild TR.            Patient Active Problem List    Diagnosis Date Noted    SSS (sick sinus syndrome) ([facility] [facility] 09/17/2019    Closed right hip fracture ([facility] [address] 09/16/2019    Alzheimer's disease     CHF (congestive heart failure) ([facility] [address] [facility]     Bradycardia 12/11/2015    Fall as cause of accidental injury in residential institution as place of occurrence 12/11/2015    Elevated troponin 12/11/2015    Hyperlipidemia     Essential hypertension     Dementia     Diabetes ([address]     Paroxysmal atrial fibrillation (HCC)     Short of breath on exertion 08/07/2014      Satish Reid MD  Past Medical History:   Diagnosis Date    Alzheimer's disease     CHF (congestive heart failure) ([facility] [address] [facility]     Dementia     Diabetes ([facility] [address]     type II    Elevated troponin 12/11/2015    Essential hypertension     Fall as cause of accidental injury in residential institution as place of occurrence 12/11/2015    GERD (gastroesophageal reflux disease)     Heart failure (HCC)     Hyperlipidemia     Hyperlipidemia     Hypoglycemia     Ill-defined condition     embolism (DVT)    Paroxysmal atrial fibrillation [facility])     Psychiatric disorder     anxiety      Social History     Socioeconomic History    Marital status:      Spouse name: Not on file    Number of children: Not on file    Years of education: Not on file    Highest education level: Not on file   Tobacco Use    Smoking status: Never Smoker    Smokeless tobacco: Never Used   Substance and Sexual Activity    Alcohol use: No    Drug use: No     Allergies   Allergen Reactions    Influenza Virus Vaccine, Specific Hives    Penicillins Unknown (comments)    Sulfacetamide Unknown (comments)      History reviewed. No pertinent family history.    Current Facility-Administered Medications   Medication Dose Route Frequency    0.9% sodium chloride infusion  125 mL/hr IntraVENous CONTINUOUS    sodium chloride (NS) flush 5-40 mL  5-40 mL IntraVENous Q8H    sodium chloride (NS) flush 5-40 mL  5-40 mL IntraVENous PRN    naloxone (NARCAN) injection 0.4 mg  0.4 mg IntraVENous PRN    [START ON 9/18/2019] calcium-vitamin D (OS-MAI) 500 mg-200 unit tablet  1 Tab Oral TID WITH MEALS    senna-docusate (PERICOLACE) 8.6-50 mg per tablet 1 Tab  1 Tab Oral BID    [START ON 9/18/2019] polyethylene glycol (MIRALAX) packet 17 g  17 g Oral DAILY    [START ON 9/19/2019] bisacodyl (DULCOLAX) suppository 10 mg  10 mg Rectal DAILY PRN    acetaminophen (TYLENOL) tablet 650 mg  650 mg Oral Q6H    traMADol (ULTRAM) tablet 50 mg  50 mg Oral Q6H PRN    oxyCODONE IR (ROXICODONE) tablet 5 mg  5 mg Oral Q4H PRN    clindamycin (CLEOCIN) 900mg D5W 50mL IVPB (premix)  900 mg IntraVENous Q8H    famotidine (PEPCID) tablet 20 mg  20 mg Oral Q24H    WARFARIN INFORMATION NOTE (COUMADIN)   Other QPM    sodium chloride (NS) flush 5-40 mL  5-40 mL IntraVENous Q8H    sodium chloride (NS) flush 5-40 mL  5-40 mL IntraVENous PRN    acetaminophen (TYLENOL) tablet 650 mg  650 mg Oral Q4H PRN    HYDROmorphone (PF) (DILAUDID) injection 0.5 mg  0.5 mg IntraVENous Q4H PRN    naloxone [facility]) injection 0.4 mg  0.4 mg IntraVENous PRN    ondansetron (ZOFRAN) injection 4 mg  4 mg IntraVENous Q4H PRN    bisacodyl (DULCOLAX) tablet 5 mg  5 mg Oral DAILY PRN    heparin (porcine) injection 5,000 Units  5,000 Units SubCUTAneous Q8H    allopurinol (ZYLOPRIM) tablet 100 mg  100 mg Oral DAILY    amLODIPine (NORVASC) tablet 10 mg  10 mg Oral DAILY    benztropine (COGENTIN) tablet 1 mg  1 mg Oral BID    carvedilol (COREG) tablet 3.125 mg  3.125 mg Oral BID WITH MEALS    ferrous sulfate tablet 325 mg  325 mg Oral BID WITH MEALS    fluPHENAZine (PROLIXIN) tablet 1 mg  1 mg Oral DAILY    fluPHENAZine (PROLIXIN) tablet 2 mg  2 mg Oral QHS    insulin glargine (LANTUS) injection 6 Units  6 Units SubCUTAneous QHS    memantine (NAMENDA) tablet 10 mg  10 mg Oral Q12H    potassium chloride (KLOR-CON) packet for solution 20 mEq  20 mEq Oral DAILY    pravastatin (PRAVACHOL) tablet 20 mg  20 mg Oral QHS    torsemide (DEMADEX) tablet 100 mg  100 mg Oral DAILY    glucose chewable tablet 16 g  4 Tab Oral PRN    glucagon (GLUCAGEN) injection 1 mg  1 mg IntraMUSCular PRN    insulin lispro (HUMALOG) injection   SubCUTAneous AC&HS    dextrose 10 % infusion 125-250 mL  125-250 mL IntraVENous PRN        Review of Symptoms:   11 systems reviewed, negative other than as stated in the HPI        Objective:      Visit Vitals  /65 (BP Patient Position: At rest)   Pulse 73   Temp 98.2 °F (36.8 °C)   Resp 18   Wt 185 lb 3 oz (84 kg)   SpO2 98%   BMI 29.89 kg/m²       Physical:   General: pleasant, drowsy elderly AAF resting on stretcher in NAD  Heart: RRR, no m/S3/JVD  Lungs: clear   Abdomen: Soft, +BS, NTND   Extremities: LE david +DP/PT, no edema   Neurologic: Grossly normal, drowsy    Data Review:   Recent Labs     09/17/19  0312 09/16/19  1304   WBC 8.9 8.9   HGB 10.9* 11.7   HCT 35.6 37.6    173     Recent Labs     09/17/19  1741 09/17/19  0312 09/16/19  1556 09/16/19  1345   NA  --  138  --  136   K --  3.7  --  3.8   CL  --  101  --  99   CO2  --  32  --  29   GLU  --  254*  --  350*   BUN  --  44*  --  34*   CREA  --  1.91*  --  1.63*   CA  --  8.6  --  8.7   ALB  --   --   --  2.5*   TBILI  --   --   --  0.5   SGOT  --   --   --  15   ALT  --   --   --  16   INR 1.2*  --  1.2*  --        No results for input(s): TROIQ, CPK, CKMB in the last 72 hours. Intake/Output Summary (Last 24 hours) at 9/17/2019 2036  Last data filed at 9/17/2019 1543  Gross per 24 hour   Intake 325 ml   Output 145 ml   Net 180 ml        Cardiographics    Telemetry: SR with wenckebach, periods of PAF, occasional junctional beats  ECG: a fib    Echocardiogram: EF 56-60%; mild AI and mild TR  CXRAY: no acute process        Assessment:       Active Problems:    Paroxysmal atrial fibrillation (HCC) ()      Closed right hip fracture (HCC) (9/16/2019)      SSS (sick sinus syndrome) ([facility] (9/17/2019)         Plan:     Octavio Garcia is a 80 y.o. female who has a hip fracture s/p fall. Cardiology consulted for pre op clearance. EKG with a fib. INR 1.2  Creat 1.91. Tele review with 2nd degree type 1 AVB, some junctional beats, and some a fib. · ECHO with EF 56-60% and no significant structural defects  · Tele review shows a fib, AVB, and junctional beats likely representing SSS, which would normally be indication for a pacemaker. · Due to no significant pauses on tele review and increased morbidity/mortality risk if surgery delayed, OK to proceed with urgent surgery at slightly elevated risk with SSS. Dr. Claudio Siddiqui discussed with EP Dr. Rachel Sanchez and Dr. Lisa Ernst. Have cardizem and pacer pads on standby for surgery, if needed. · Will follow post op. Thank you for consulting [facility]    Jessee Avery, DIDI  DNP, RN, [facility]    Patient seen and examined by me with the above nurse practitioner.   I personally performed all components of the history, physical, and medical decision making and agree with the assessment and plan with minor modifications as noted. Elevated cardiac risk due to age and sick sinus syndrome, but mortality risk with delaying hip repair is likely higher than the mortality risk of proceeding with surgery. No prolonged pauses, no excessively fast heart rate at this time. Recommend diltiazem as needed with transcutaneous pacemaker pads on at all times in case of any vagal response that might necessitate temporary pacing. Will need electrophysiology consult prior to discharge for sick sinus syndrome. Assess atrial fibrillation burden, and decide on whether she will benefit from anticoagulation or whether she is too high risk prior to discharge.

## 2019-09-17 NOTE — PROGRESS NOTES
Bedside and Verbal shift change report given to Jerrell Dowd RN (oncoming nurse) by Gigi Almaguer RN (offgoing nurse). Report included the following information SBAR, Kardex, Intake/Output, MAR, Recent Results and Med Rec Status.

## 2019-09-17 NOTE — CONSULTS
Admit to medicine  Npo after midnight  Bedrest    Consent for right hip IMN   Surgery when cleared by medicine    Npo

## 2019-09-17 NOTE — PERIOP NOTES
Handoff Report from Operating Room to PACU    Report received from Padmaja Ludwig CRNA and Charan Sosa RN regarding Lima Guerrero. Surgeon(s):  Roxana Coelho DO  And Procedure(s) (LRB):  RIGHT FEMUR OPEN REDUCTION INTERNAL FIXATION (Right)  confirmed   with allergies, drains and dressings discussed. Anesthesia type, drugs, patient history, complications, estimated blood loss, vital signs, intake and output, and last pain medication, lines, reversal medications and temperature were reviewed.

## 2019-09-17 NOTE — PROGRESS NOTES
Pharmacy Daily Dosing of Warfarin    Indication: afib    Goal INR: 2-3    PTA Warfarin Dose: Patient takes 5 mg M-Th-F-Su evenings, and 7.5 mg Tu-W-Sa evenings    Concurrent anticoagulants: none    Concurrent antiplatelet: none    Major Interacting Medications   Drugs that may increase INR: allopurinol  Drugs that may decrease INR:     Conditions that may increase/decrease INR (CHF, C. diff, cirrhosis, thyroid disorder, hypoalbuminemia):     Labs:  Recent Labs     09/17/19  1741 09/17/19  0312 09/16/19  1556 09/16/19  1345 09/16/19  1304   INR 1.2*  --  1.2*  --   --    HGB  --  10.9*  --   --  11.7   PLT  --  172  --   --  173   SGOT  --   --   --  15  --    TBILI  --   --   --  0.5  --    ALB  --   --   --  2.5*  --            Impression/Plan:   Will order warfarin 5mg PO x 1  Reviewed media scans. Found one from the [facility] stating her goal INR was 1.3-1.5 due to age, fall risk, and comorbidities. If pt was on the regimen as stated on the PTA med list, I would expect her initial INR to be higher. Please f/up with rehab facility and confirm INR goal  Daily INR  CBC w/o diff QOD     Pharmacy will follow daily and adjust the dose as appropriate. Thanks  Antonette Oviedo, PHARMD      [email or web address]

## 2019-09-17 NOTE — ANESTHESIA PREPROCEDURE EVALUATION
Relevant Problems   No relevant active problems       Anesthetic History   No history of anesthetic complications            Review of Systems / Medical History  Patient summary reviewed, nursing notes reviewed and pertinent labs reviewed    Pulmonary  Within defined limits                 Neuro/Psych         Psychiatric history and dementia    Comments: Alzheimer's disease Cardiovascular    Hypertension      CHF  Dysrhythmias : atrial fibrillation  Hyperlipidemia    Exercise tolerance: <4 METS  Comments: Echo this am: Estimated left ventricular ejection fraction is 46 - 50%. Calculated left ventricular ejection fraction is 48%. No regional wall motion abnormality noted. GI/Hepatic/Renal     GERD    Renal disease: CRI       Endo/Other    Diabetes    Anemia     Other Findings   Comments: Right Hip Fracture           Physical Exam    Airway  Mallampati: I    Neck ROM: normal range of motion   Mouth opening: Normal     Cardiovascular  Regular rate and rhythm,  S1 and S2 normal,  no murmur, click, rub, or gallop             Dental      Comments:  Only a few lower teeth left   Pulmonary  Breath sounds clear to auscultation               Abdominal  GI exam deferred       Other Findings            Anesthetic Plan    ASA: 3  Anesthesia type: general    Monitoring Plan: BIS      Induction: Intravenous  Anesthetic plan and risks discussed with: Patient and Son / Daughter

## 2019-09-17 NOTE — PERIOP NOTES
TRANSFER - IN REPORT:    Verbal report received from Sudhakar Garcia RN on Alycia Oas  being received from [id number] for ordered procedure      Report consisted of patients Situation, Background, Assessment and   Recommendations(SBAR). Information from the following report(s) SBAR, Kardex, Intake/Output, MAR, Recent Results and Cardiac Rhythm Intermittent AFib to SR was reviewed with the receiving nurse. Opportunity for questions and clarification was provided.       Awaiting Medical/Cardiac Clearance Clarification

## 2019-09-17 NOTE — OP NOTES
Date of Procedure: 9/17/19  PREOPERATIVE DIAGNOSIS: Right intertrochanteric hip fracture. POSTOPERATIVE DIAGNOSIS: Right intertrochanteric hip fracture. OPERATION: Right short cephalomedullary nail. ASSISTANT SURGEON: none  ANESTHESIA: general   COMPLICATIONS: None. SPECIMENS: None. DRAINS: None. ESTIMATED BLOOD LOSS: 100mL. Implant: Synthes TFN A nail, 125 degree neck angle, [id number] mm with a 100 mm lag screw and 36 mm distal locking screw       OPERATIVE INDICATIONS: This is a 80year old female who did sustain an intertrochanteric hip fracture on the left side. We did discuss nonoperative and operative management. For mobilization purposes, pain control, the ability to ambulate and prevent the sequelae of non-weightbearing restrictions for 6-12 weeks, the patient did request surgical intervention. We did discuss the risks of surgery which include but are not limited to infection, nerve or blood vessel damage, need for re-operation, death, disability, DVT, PE, postoperative pain, swelling and stiffness, hardware failure, hardware cut-out, need for subsequent procedures, organ dysfunction of all kinds and wound healing complications. The patient did freely consent for surgery. DESCRIPTION OF PROCEDURE IN DETAIL: After the correct site and side were identified by myself in the holding area, the patient was transported to the surgical suite where after successful induction of anesthesia, was placed on a fracture table where all bony prominences were padded. The contralateral leg was placed in a well leg doherty. The left leg was then placed in a traction boot. After an appropriate timeout and confirmation that antibiotics had been infused prior to any incision, x-rays were taken and a manual reduction was performed with the use of internal rotation as well as adduction of the intertrochanteric hip fracture.  Once I was satisfied with the positioning as well as the reduction, the left leg was then prepped and draped in the usual sterile orthopedic fashion. After a repeat timeout, an incision was made just proximal to the greater trochanter in line with the femur. The deep fascia was then sharply incised. This did allow for palpation of the greater trochanteric tip. I then used a guide pin for the trochanteric set to gain entry into the femoral canal. I did use orthogonal views to confirm good positioning of the start point. Once I was satisfied with the positioning of the pin, I did over-ream with the reamer and a soft tissue protector. With that, a nail was chosen and this was impacted into place with manual impaction only. There was no use of a reamer or mallet to insert the nail. Once I was satisfied with the depth of the nail, I then used the jig to make an incision at the level of the lag screw. I did incise the skin and fascia sharply in a longitudinal incision. The jig was then taken down to bone. A guide pin was then inserted into the femoral head through the nail in excellent tip apex distance. This was satisfactory on orthogonal views. The tip apex distance was well below 25 mm. With that, we measured and then drilled and tapped the femoral neck and head. I then placed the lag screw under direct fluoroscopic visualization. This did again have excellent tip apex distance with no penetration of the joint on orthogonal views. The locking mechanism was engaged at the proximal aspect of the nail. The compression device was used to compress down the intertrochanteric fracture line. The distal locking screw was then placed off of the jig, using a nick and spread method to gain entry into the thigh, and care was taken not to plunge medially with the drill. The depth was then measured and screw placed. Once I was satisfied with the positioning of the component as well as the fixation, final x-rays were taken. I therefore copiously irrigated all wounds with normal saline.  The deep fascia was closed with #1 Vicryl sutures. The skin was closed with 2-0 Vicryl and 3-0 Monocryl. Dermabond and Steri-Strips were applied. The patient was then awoken and taken to PACU in stable condition with no obvious intraoperative complications. POSTOPERATIVE PLAN: The patient will be weightbearing as tolerated. Anticoagulation will be per the primary team. There will be no hip restrictions. Dressings are to be changed PRN only. He will be seen in 2 weeks, then 6 weeks for standard fracture follow up.

## 2019-09-17 NOTE — PERIOP NOTES
TRANSFER - OUT REPORT:    Verbal report given to Marybel River RN (name) on Arabella Taveras  being transferred to [facility] ([facility]) for routine post - op       Report consisted of patients Situation, Background, Assessment and   Recommendations(SBAR). Information from the following report(s) SBAR, Kardex, OR Summary, Procedure Summary, Intake/Output and MAR was reviewed with the receiving nurse. Lines:   Peripheral IV Right Antecubital (Active)   Site Assessment Clean, dry, & intact 9/17/2019  3:53 PM   Phlebitis Assessment 0 9/17/2019  3:53 PM   Infiltration Assessment 0 9/17/2019  3:53 PM   Dressing Status Clean, dry, & intact 9/17/2019  3:53 PM   Dressing Type Tape;Transparent 9/17/2019  3:53 PM   Hub Color/Line Status Pink; Infusing 9/17/2019  3:53 PM       Peripheral IV 09/17/19 Anterior;Right Hand (Active)   Site Assessment Clean, dry, & intact 9/17/2019  1:29 PM   Phlebitis Assessment 0 9/17/2019  1:29 PM   Infiltration Assessment 0 9/17/2019  1:29 PM   Dressing Status Clean, dry, & intact 9/17/2019  1:29 PM   Dressing Type Tape;Transparent 9/17/2019  1:29 PM   Hub Color/Line Status Pink; Infusing 9/17/2019  1:29 PM        Opportunity for questions and clarification was provided.       Patient transported with:   Monitor  O2 @ 2 liters  Registered Nurse  [facility]

## 2019-09-17 NOTE — PROGRESS NOTES
Patient seen, discussed with patient and family that surgery is recommended. Will proceed with orif right hip in am.  Full consult to follow.

## 2019-09-17 NOTE — CONSULTS
Date of Consult: 9/17/2019    CC: Right hip pain    HPI:   This is a(n) 80 y.o. patient who is status post fall from standing. The patient had the immediate onset of pain as well as inability to ambulate. Complains of right hip and groin pain. Does not complain of any open wounds or head trauma. No loss of consciousness. No other musculoskeletal complaints. The patient denies any other history of fevers, chills, nausea, vomiting, no other numbness, weakness, or tingling. PMH:  Past Medical History:   Diagnosis Date    Alzheimer's disease     CHF (congestive heart failure) ([facility] [place] [address]     Dementia     Diabetes ([facility] [place] [address]     type II    Elevated troponin 12/11/2015    Essential hypertension     Fall as cause of accidental injury in residential institution as place of occurrence 12/11/2015    GERD (gastroesophageal reflux disease)     Heart failure (HCC)     Hyperlipidemia     Hyperlipidemia     Hypoglycemia     Ill-defined condition     embolism (DVT)    Paroxysmal atrial fibrillation (HCC)     Psychiatric disorder     anxiety       PSxHx:  History reviewed. No pertinent surgical history.     Meds:    Current Facility-Administered Medications:     sodium chloride (NS) flush 5-40 mL, 5-40 mL, IntraVENous, Q8H, Lyndsey Walker MD, 10 mL at 09/17/19 0530    sodium chloride (NS) flush 5-40 mL, 5-40 mL, IntraVENous, PRN, Francisco Brown MD    acetaminophen (TYLENOL) tablet 650 mg, 650 mg, Oral, Q4H PRN, Francisco Brown MD    oxyCODONE-acetaminophen (PERCOCET) 5-325 mg per tablet 1 Tab, 1 Tab, Oral, Q4H PRN, Francisco Brown MD, 1 Tab at 09/16/19 2104    HYDROmorphone (PF) (DILAUDID) injection 0.5 mg, 0.5 mg, IntraVENous, Q4H PRN, Francisco Brown MD, 0.5 mg at 09/17/19 1011    naloxone [facility]) injection 0.4 mg, 0.4 mg, IntraVENous, PRN, Francisco Brown MD    ondansetron [facility]) injection 4 mg, 4 mg, IntraVENous, Q4H PRN, Francisco Brown MD    bisacodyl (DULCOLAX) tablet 5 mg, 5 mg, Oral, DAILY PRN, Akash Teixeira MD    heparin (porcine) injection 5,000 Units, 5,000 Units, SubCUTAneous, Q8H, Najma BALDWIN MD, Stopped at 09/16/19 2144    allopurinol (ZYLOPRIM) tablet 100 mg, 100 mg, Oral, DAILY, Akash Teixeira MD    amLODIPine (NORVASC) tablet 10 mg, 10 mg, Oral, DAILY, Akash Teixeira MD    benztropine (COGENTIN) tablet 1 mg, 1 mg, Oral, BID, Akash Teixeira MD, 1 mg at 09/16/19 2103    carvedilol (COREG) tablet 3.125 mg, 3.125 mg, Oral, BID WITH MEALS, Akash Teixeira MD    ferrous sulfate tablet 325 mg, 325 mg, Oral, BID WITH MEALS, Akash Teixeira MD    fluPHENAZine (PROLIXIN) tablet 1 mg, 1 mg, Oral, DAILY, Akash Teixeira MD    fluPHENAZine (PROLIXIN) tablet 2 mg, 2 mg, Oral, QHS, Akash Teixeira MD, 2 mg at 09/16/19 2103    insulin glargine (LANTUS) injection 6 Units, 6 Units, SubCUTAneous, QHS, Akash Teixeira MD, 6 Units at 09/16/19 2143    memantine (NAMENDA) tablet 10 mg, 10 mg, Oral, Q12H, Akash Teixeira MD, 10 mg at 09/16/19 2103    potassium chloride (KLOR-CON) packet for solution 20 mEq, 20 mEq, Oral, DAILY, Akash Teixeira MD    pravastatin (PRAVACHOL) tablet 20 mg, 20 mg, Oral, QHS, Akash Teixeira MD, 20 mg at 09/16/19 2103    torsemide (DEMADEX) tablet 100 mg, 100 mg, Oral, DAILY, Akash Teixeira MD    glucose chewable tablet 16 g, 4 Tab, Oral, PRN, Akash Teixeira MD    glucagon (GLUCAGEN) injection 1 mg, 1 mg, IntraMUSCular, PRN, Akash Teixeira MD    insulin lispro (HUMALOG) injection, , SubCUTAneous, AC&HS, Akash Teixeira MD, 3 Units at 09/17/19 0923    dextrose 10 % infusion 125-250 mL, 125-250 mL, IntraVENous, PRN, Akash Teixeira MD    All:   Allergies   Allergen Reactions    Influenza Virus Vaccine, Specific Hives    Penicillins Unknown (comments)    Sulfacetamide Unknown (comments)       Social Hx:  Social History     Socioeconomic History    Marital status:      Spouse name: Not on file    Number of children: Not on file    Years of education: Not on file    Highest education level: Not on file   Tobacco Use    Smoking status: Never Smoker   Substance and Sexual Activity    Alcohol use: No    Drug use: No       Family Hx:  No family history on file. Review of Systems:       General: Denies headache, lethargy, fever, weight loss  Ears/Nose/Throat: Denies ear discharge, drainage, nosebleeds, hoarse voice, dental problems  Cardiovascular: Denies chest pain, shortness of breath  Lungs: Denies chest pain, breathing problems, wheezing, pneumonia  Stomach: Denies stomach pain, heartburn, constipation, irritable bowel  Skin: Denies rash, sores, open wounds  Musculoskeletal: right hip and groin pain  Genitourinary: Denies dysuria, hematuria, polyuria  Gastrointestinal: Denies constipation, obstipation, diarrhea  Neurological: Denies changes in sight, smell, hearing, taste, seizures. Denies loss of consciousness.   Psychiatric: Denies depression, sleep pattern changes, anxiety, change in personality  Endocrine: Denies mood swings, heat or cold intolerance  Hematologic/Lymphatic: Denies anemia, purpura, petechia  Allergic/Immunologic: Denies swelling of throat, pain or swelling at lymph nodes      Physical Examination:    Visit Vitals  /51 (BP 1 Location: Left arm, BP Patient Position: At rest)   Pulse 65   Temp 97.6 °F (36.4 °C)   Resp 16   Wt 185 lb 3 oz (84 kg)   SpO2 92%   BMI 29.89 kg/m²        General: AOX3, no apparent distress  Psychiatric: mood and affect appropriate  Lungs: breathing is symmetric and unlabored bilaterally  Heart: regular rate and rhythm  Abdomen: no guarding  Head: normocephalic, atraumatic  Skin: No significant abnormalities, good turgor  Sensation intact to light touch: C5-T1 dermatomes  Muscular exam: 5/5 strength in all major muscle groups unless noted in specialty exam.    Extremities      Right upper extremity: Full active and passive range of motion without pain, deformity, no open wound, strength 5/5 in all major muscle groups. Left upper extremity:  Full active and passive range of motion without pain, deformity, no open wound, strength 5/5 in all major muscle groups. Left lower extremity: Full active and passive range of motion without pain, deformity, no open wound, strength 5/5 in all major muscle groups. Right lower extremity:  Focused exam of the right hip demonstrates pain with circumduction of the hip which refers to the groin. This does reproduce the symptoms of the chief complaint. Leg is shortened and externally rotated. Sensation is intact to light touch in the L1-S1 distributions bilaterally. Strength is 5 out of 5 strength at tibialis anterior, EHL and, FHL. Capillary refill is less than 2 seconds in the toes. Head is normocephalic and atraumatic, heart has a regular rate and rhythm, and breathing is symmetric and unlabored bilaterally. Imaging:  X-rays are available of the pelvis and right hip which demonstrate a displaced intertrochanteric femoral fracture. Diffuse osteopenia is noted. No other osseous abnormalities are noted. Assessment:    Right intertrochanteric femoral fracture, displaced. Plan:    We did discuss the treatment options for this right hip fracture, we did discuss that for early mobilization purposes, general hygiene, comfort, early ambulation, that surgical intervention in the form of fracture stabilization is indicated. We did discuss the risks of surgery which include but are not limited to infection, nerve or blood vessel damage, femoral, sciatic, and lateral femoral cutaneous nerve injuries, need for reoperation, postoperative pain and swelling, intra-or postoperative fracture, postoperative dislocation, leg length inequality, need for reoperation, implant failure, death, disability, organ dysfunction, wound healing issues, DVT, PE, and the need for further procedures.   The patient did freely state their understanding and satisfaction with our discussion. We will proceed after medical clearances. Plan for ORIF right hip fracture.

## 2019-09-17 NOTE — PROGRESS NOTES
Problem: Patient Education: Go to Patient Education Activity  Goal: Patient/Family Education  Outcome: Progressing Towards Goal     Problem: Hip Fracture:Day of Admission Pre-op  Goal: Off Pathway (Use only if patient is Off Pathway)  Outcome: Progressing Towards Goal  Goal: Activity/Safety  Outcome: Progressing Towards Goal  Goal: Consults, if ordered  Outcome: Progressing Towards Goal  Goal: Diagnostic Test/Procedures  Outcome: Progressing Towards Goal  Goal: Nutrition/Diet  Outcome: Progressing Towards Goal  Goal: Medications  Outcome: Progressing Towards Goal  Goal: Respiratory  Outcome: Progressing Towards Goal  Goal: Treatments/Interventions/Procedures  Outcome: Progressing Towards Goal  Goal: Psychosocial  Outcome: Progressing Towards Goal  Goal: *Labs/Tests Within Defined Limits in Preparation for Surgery  Outcome: Progressing Towards Goal  Goal: *Optimal pain control at patient's stated goal  Outcome: Progressing Towards Goal  Goal: *Hemodynamically stable  Outcome: Progressing Towards Goal     Problem: Diabetes Self-Management  Goal: *Disease process and treatment process  Description  Define diabetes and identify own type of diabetes; list 3 options for treating diabetes. Outcome: Progressing Towards Goal  Goal: *Incorporating nutritional management into lifestyle  Description  Describe effect of type, amount and timing of food on blood glucose; list 3 methods for planning meals. Outcome: Progressing Towards Goal  Goal: *Incorporating physical activity into lifestyle  Description  State effect of exercise on blood glucose levels. Outcome: Progressing Towards Goal  Goal: *Developing strategies to promote health/change behavior  Description  Define the ABC's of diabetes; identify appropriate screenings, schedule and personal plan for screenings.   Outcome: Progressing Towards Goal  Goal: *Using medications safely  Description  State effect of diabetes medications on diabetes; name diabetes medication taking, action and side effects. Outcome: Progressing Towards Goal  Goal: *Monitoring blood glucose, interpreting and using results  Description  Identify recommended blood glucose targets  and personal targets. Outcome: Progressing Towards Goal  Goal: *Prevention, detection, treatment of acute complications  Description  List symptoms of hyper- and hypoglycemia; describe how to treat low blood sugar and actions for lowering  high blood glucose level. Outcome: Progressing Towards Goal  Goal: *Prevention, detection and treatment of chronic complications  Description  Define the natural course of diabetes and describe the relationship of blood glucose levels to long term complications of diabetes.   Outcome: Progressing Towards Goal  Goal: *Developing strategies to address psychosocial issues  Description  Describe feelings about living with diabetes; identify support needed and support network  Outcome: Progressing Towards Goal  Goal: *Insulin pump training  Outcome: Progressing Towards Goal  Goal: *Sick day guidelines  Outcome: Progressing Towards Goal  Goal: *Patient Specific Goal (EDIT GOAL, INSERT TEXT)  Outcome: Progressing Towards Goal     Problem: Patient Education: Go to Patient Education Activity  Goal: Patient/Family Education  Outcome: Progressing Towards Goal     Problem: Delirium Treatment  Goal: *Level of consciousness restored to baseline  Outcome: Progressing Towards Goal  Goal: *Level of environmental perceptions restored to baseline  Outcome: Progressing Towards Goal  Goal: *Sensory perception restored to baseline  Outcome: Progressing Towards Goal  Goal: *Emotional stability restored to baseline  Outcome: Progressing Towards Goal  Goal: *Functional assessment restored to baseline  Outcome: Progressing Towards Goal  Goal: *Absence of falls  Outcome: Progressing Towards Goal  Goal: *Will remain free of delirium, CAM Score negative  Outcome: Progressing Towards Goal  Goal: *Cognitive status will be restored to baseline  Outcome: Progressing Towards Goal  Goal: Interventions  Outcome: Progressing Towards Goal     Problem: Patient Education: Go to Patient Education Activity  Goal: Patient/Family Education  Outcome: Progressing Towards Goal

## 2019-09-17 NOTE — PROGRESS NOTES
Ortho/ NeuroSurgery NP Note    POD# 0  s/p RIGHT FEMUR OPEN REDUCTION INTERNAL FIXATION     Pt resting in bed, with family. States that pain has improved since surgery. VSS Afebrile. Patient has not had something to eat/drink. No nausea. Most Recent Labs:   Lab Results   Component Value Date/Time    HGB 10.9 (L) 09/17/2019 03:12 AM    Hemoglobin A1c, External 7.4 01/14/2016 06:40 AM       Body mass index is 29.89 kg/m². Reference: BMI greater than 30 is classified as obesity and greater than 40 is classified as morbid obesity. Voiding status: flannery in place   Dressing c.d.i, cryotherapy in place     Calves soft and supple;  No pain with passive stretch  Sensation and motor intact  SCDs for mechanical DVT proph    Plan:  1) PT BID starting tomorrow  2) Rachel-op Antibiotics Ancef  3) Coumadin restarted to bed dosed by pharmacy  for DVT Prophylaxis  4) Pepcid for GI Prophylaxis    5) Discharge plans pending progression with therapy and medical readiness per primary team    Readiness for discharge:     [x] Vital Signs stable    [x] Hgb stable    [] + Voiding- plan to remove flannery in am    [x] Incision intact, drainage minimal    [x] Tolerating PO intake     [] Cleared by PT (OT if applicable)     [] Stair training completed (if applicable)    [] Independent/Contact Guard ambulation with assistive device (household distance)     [] Bed mobility     [] Car transfers     [] ADLs    [x] Adequate pain control on oral medication alone      Jaky Jordan NP

## 2019-09-17 NOTE — PROGRESS NOTES
7:00 AM  Patients family wanted to inform staff that patient does not like and will not eat gravy, bananas, or leafy green vegetables. Patient also takes pills crushed in applesauce.

## 2019-09-17 NOTE — ANESTHESIA POSTPROCEDURE EVALUATION
Procedure(s):  RIGHT FEMUR OPEN REDUCTION INTERNAL FIXATION. general    Anesthesia Post Evaluation        Patient location during evaluation: PACU  Note status: Adequate. Level of consciousness: responsive to verbal stimuli and sleepy but conscious  Pain management: satisfactory to patient  Airway patency: patent  Anesthetic complications: no  Cardiovascular status: acceptable  Respiratory status: acceptable  Hydration status: acceptable  Comments: +Post-Anesthesia Evaluation and Assessment    Patient: Michel Nair MRN: [id number]  SSN: [id number]   YOB: 1927  Age: 80 y.o. Sex: female      Cardiovascular Function/Vital Signs    /85   Pulse 72   Temp 37.1 °C (98.7 °F)   Resp 16   Wt 84 kg (185 lb 3 oz)   SpO2 96%   BMI 29.89 kg/m²     Patient is status post Procedure(s):  RIGHT FEMUR OPEN REDUCTION INTERNAL FIXATION. Nausea/Vomiting: Controlled. Postoperative hydration reviewed and adequate. Pain:  Pain Scale 1: FLACC (09/17/19 1600)  Pain Intensity 1: 0 (09/17/19 1253)   Managed. Neurological Status:   Neuro (WDL): Exceptions to WDL (09/17/19 1553)   At baseline. Mental Status and Level of Consciousness: Arousable. Pulmonary Status:   O2 Device: Nasal cannula (09/17/19 1605)   Adequate oxygenation and airway patent. Complications related to anesthesia: None    Post-anesthesia assessment completed. No concerns. Signed By: Marin Smart MD    9/17/2019  Post anesthesia nausea and vomiting:  controlled      Vitals Value Taken Time   /85 9/17/2019  4:15 PM   Temp 37.1 °C (98.7 °F) 9/17/2019  3:56 PM   Pulse 86 9/17/2019  4:22 PM   Resp 19 9/17/2019  4:22 PM   SpO2 95 % 9/17/2019  4:22 PM   Vitals shown include unvalidated device data.

## 2019-09-17 NOTE — PROGRESS NOTES
Hospitalist Progress Note    NAME: Miky Hampton   :  1927   MRN:  [id number]       Assessment / Plan:    Right intertrochanteric hip fracture  ORIF planned by orthopedic surgery after medical clearance  Continue pain management DVT prophylaxis    History of chronic systolic and diastolic heart failure  Not in exacerbation  Follow echo results     history of A. fib  Rate controlled  Warfarin was held for possible surgery    Chronic kidney disease stage III  Follow creatinine daily    History of diabetes mellitus type 2  Sliding scale and home Lantus. When will have time for surgery we will stop all short acting insulin and continue long-acting at 50% of her home dose    History of dementia  Continue home meds      25.0 - 29.9 Overweight / Body mass index is 29.89 kg/m². Code status: DNR  Prophylaxis: SCD's  Recommended Disposition: TBD     Subjective:     Patient was seen and examined this morning. Family was at bedside. All questions were answered    She states she is not in pain. Review of Systems:  Symptom Y/N Comments  Symptom Y/N Comments   Fever/Chills    Chest Pain     Poor Appetite    Edema     Cough    Abdominal Pain     Sputum    Joint Pain     SOB/BUTTS    Pruritis/Rash     Nausea/vomit    Tolerating PT/OT     Diarrhea    Tolerating Diet     Constipation    Other       Could NOT obtain due to:      Objective:     VITALS:   Last 24hrs VS reviewed since prior progress note.  Most recent are:  Patient Vitals for the past 24 hrs:   Temp Pulse Resp BP SpO2   19 1127 98.4 °F (36.9 °C) 75 16 135/61 97 %   19 0919 97.6 °F (36.4 °C) 65 16 134/51 92 %   19 0732    119/58    19 0320 97.7 °F (36.5 °C) 70 16 119/58 93 %   19 0000  69      19 2324 97.9 °F (36.6 °C) 76 16 126/67 92 %   19 2027 98.4 °F (36.9 °C) 68 16 149/69 94 %   19 1853 98.4 °F (36.9 °C) 72 16 152/71 94 %   19 1730 98.6 °F (37 °C) 76 14 133/66 98 %   19 1715    135/61 98 %   09/16/19 1700    137/66 91 %   09/16/19 1600    128/53 (!) 89 %   09/16/19 1545    135/64 93 %   09/16/19 1530    142/57 91 %   09/16/19 1500    147/60 (!) 89 %   09/16/19 1430    134/71 (!) 84 %   09/16/19 1400    (!) 152/95 95 %   09/16/19 1330    (!) 154/92 95 %     No intake or output data in the 24 hours ending 09/17/19 1230     PHYSICAL EXAM:  General: WD, WN. Alert, cooperative, no acute distress    EENT:  EOMI. Anicteric sclerae. MMM  Resp:  CTA bilaterally, no wheezing or rales. No accessory muscle use  CV:  Regular  rhythm,  No edema  GI:  Soft, Non distended, Non tender.  +Bowel sounds  Neurologic:  Alert and oriented X 3, normal speech,   Psych:   Good insight. Not anxious nor agitated  Skin:  No rashes. No jaundice    Reviewed most current lab test results and cultures  YES  Reviewed most current radiology test results   YES  Review and summation of old records today    NO  Reviewed patient's current orders and MAR    YES  PMH/ reviewed - no change compared to H&P  ________________________________________________________________________  Care Plan discussed with:    Comments   Patient     Family      RN     Care Manager     Consultant                        Multidiciplinary team rounds were held today with , nursing, pharmacist and clinical coordinator. Patient's plan of care was discussed; medications were reviewed and discharge planning was addressed.      ________________________________________________________________________  Total NON critical care TIME:  39   Minutes    Total CRITICAL CARE TIME Spent:   Minutes non procedure based      Comments   >50% of visit spent in counseling and coordination of care     ________________________________________________________________________  Latoya Cortes MD     Procedures: see electronic medical records for all procedures/Xrays and details which were not copied into this note but were reviewed prior to creation of Plan. LABS:  I reviewed today's most current labs and imaging studies.   Pertinent labs include:  Recent Labs     09/17/19 0312 09/16/19  1304   WBC 8.9 8.9   HGB 10.9* 11.7   HCT 35.6 37.6    173     Recent Labs     09/17/19 0312 09/16/19  1556 09/16/19  1345     --  136   K 3.7  --  3.8     --  99   CO2 32  --  29   *  --  350*   BUN 44*  --  34*   CREA 1.91*  --  1.63*   CA 8.6  --  8.7   ALB  --   --  2.5*   TBILI  --   --  0.5   SGOT  --   --  15   ALT  --   --  16   INR  --  1.2*  --        Signed: David Barrera MD

## 2019-09-18 PROBLEM — I49.5 TACHY-BRADY SYNDROME (HCC): Status: ACTIVE | Noted: 2019-09-18

## 2019-09-18 LAB
ANION GAP SERPL CALC-SCNC: 9 MMOL/L (ref 5–15)
BUN SERPL-MCNC: 56 MG/DL (ref 6–20)
BUN/CREAT SERPL: 23 (ref 12–20)
CALCIUM SERPL-MCNC: 8 MG/DL (ref 8.5–10.1)
CHLORIDE SERPL-SCNC: 103 MMOL/L (ref 97–108)
CO2 SERPL-SCNC: 28 MMOL/L (ref 21–32)
CREAT SERPL-MCNC: 2.4 MG/DL (ref 0.55–1.02)
GLUCOSE BLD STRIP.AUTO-MCNC: 221 MG/DL (ref 65–100)
GLUCOSE BLD STRIP.AUTO-MCNC: 284 MG/DL (ref 65–100)
GLUCOSE BLD STRIP.AUTO-MCNC: 323 MG/DL (ref 65–100)
GLUCOSE BLD STRIP.AUTO-MCNC: 358 MG/DL (ref 65–100)
GLUCOSE SERPL-MCNC: 229 MG/DL (ref 65–100)
HGB BLD-MCNC: 10.2 G/DL (ref 11.5–16)
INR PPP: 1.2 (ref 0.9–1.1)
POTASSIUM SERPL-SCNC: 3.9 MMOL/L (ref 3.5–5.1)
PROTHROMBIN TIME: 12.5 SEC (ref 9–11.1)
SERVICE CMNT-IMP: ABNORMAL
SODIUM SERPL-SCNC: 140 MMOL/L (ref 136–145)

## 2019-09-18 PROCEDURE — 77010033678 HC OXYGEN DAILY

## 2019-09-18 PROCEDURE — 74011636637 HC RX REV CODE- 636/637: Performed by: INTERNAL MEDICINE

## 2019-09-18 PROCEDURE — 74011250637 HC RX REV CODE- 250/637: Performed by: INTERNAL MEDICINE

## 2019-09-18 PROCEDURE — 74011250636 HC RX REV CODE- 250/636: Performed by: INTERNAL MEDICINE

## 2019-09-18 PROCEDURE — 97530 THERAPEUTIC ACTIVITIES: CPT

## 2019-09-18 PROCEDURE — 97110 THERAPEUTIC EXERCISES: CPT

## 2019-09-18 PROCEDURE — 97161 PT EVAL LOW COMPLEX 20 MIN: CPT

## 2019-09-18 PROCEDURE — 74011250637 HC RX REV CODE- 250/637: Performed by: NURSE PRACTITIONER

## 2019-09-18 PROCEDURE — 94760 N-INVAS EAR/PLS OXIMETRY 1: CPT

## 2019-09-18 PROCEDURE — 82962 GLUCOSE BLOOD TEST: CPT

## 2019-09-18 PROCEDURE — 74011250637 HC RX REV CODE- 250/637: Performed by: ORTHOPAEDIC SURGERY

## 2019-09-18 PROCEDURE — 97165 OT EVAL LOW COMPLEX 30 MIN: CPT

## 2019-09-18 PROCEDURE — 65270000029 HC RM PRIVATE

## 2019-09-18 PROCEDURE — 74011250636 HC RX REV CODE- 250/636: Performed by: ORTHOPAEDIC SURGERY

## 2019-09-18 PROCEDURE — 80048 BASIC METABOLIC PNL TOTAL CA: CPT

## 2019-09-18 PROCEDURE — 85610 PROTHROMBIN TIME: CPT

## 2019-09-18 PROCEDURE — 85018 HEMOGLOBIN: CPT

## 2019-09-18 PROCEDURE — 36415 COLL VENOUS BLD VENIPUNCTURE: CPT

## 2019-09-18 RX ORDER — WARFARIN SODIUM 5 MG/1
5 TABLET ORAL ONCE
Status: COMPLETED | OUTPATIENT
Start: 2019-09-18 | End: 2019-09-18

## 2019-09-18 RX ORDER — INSULIN LISPRO 100 [IU]/ML
INJECTION, SOLUTION INTRAVENOUS; SUBCUTANEOUS
Status: DISCONTINUED | OUTPATIENT
Start: 2019-09-18 | End: 2019-09-20 | Stop reason: HOSPADM

## 2019-09-18 RX ORDER — SODIUM CHLORIDE 9 MG/ML
100 INJECTION, SOLUTION INTRAVENOUS CONTINUOUS
Status: DISPENSED | OUTPATIENT
Start: 2019-09-18 | End: 2019-09-19

## 2019-09-18 RX ADMIN — MEMANTINE HYDROCHLORIDE 10 MG: 10 TABLET ORAL at 09:57

## 2019-09-18 RX ADMIN — Medication 10 ML: at 14:38

## 2019-09-18 RX ADMIN — ACETAMINOPHEN 650 MG: 325 TABLET ORAL at 06:10

## 2019-09-18 RX ADMIN — TORSEMIDE 100 MG: 20 TABLET ORAL at 09:57

## 2019-09-18 RX ADMIN — FLUPHENAZINE HYDROCHLORIDE 2 MG: 1 TABLET, FILM COATED ORAL at 21:36

## 2019-09-18 RX ADMIN — WARFARIN SODIUM 5 MG: 5 TABLET ORAL at 18:04

## 2019-09-18 RX ADMIN — MEMANTINE HYDROCHLORIDE 10 MG: 10 TABLET ORAL at 21:36

## 2019-09-18 RX ADMIN — FERROUS SULFATE TAB 325 MG (65 MG ELEMENTAL FE) 325 MG: 325 (65 FE) TAB at 18:04

## 2019-09-18 RX ADMIN — BENZTROPINE MESYLATE 1 MG: 1 TABLET ORAL at 21:37

## 2019-09-18 RX ADMIN — CARVEDILOL 3.12 MG: 3.12 TABLET, FILM COATED ORAL at 18:04

## 2019-09-18 RX ADMIN — ACETAMINOPHEN 650 MG: 325 TABLET ORAL at 00:00

## 2019-09-18 RX ADMIN — Medication 10 ML: at 21:38

## 2019-09-18 RX ADMIN — CLINDAMYCIN PHOSPHATE 900 MG: 900 INJECTION, SOLUTION INTRAVENOUS at 11:45

## 2019-09-18 RX ADMIN — INSULIN LISPRO 3 UNITS: 100 INJECTION, SOLUTION INTRAVENOUS; SUBCUTANEOUS at 09:56

## 2019-09-18 RX ADMIN — OXYCODONE HYDROCHLORIDE 5 MG: 5 TABLET ORAL at 12:37

## 2019-09-18 RX ADMIN — ALLOPURINOL 100 MG: 100 TABLET ORAL at 09:56

## 2019-09-18 RX ADMIN — FAMOTIDINE 20 MG: 20 TABLET ORAL at 18:04

## 2019-09-18 RX ADMIN — AMLODIPINE BESYLATE 10 MG: 5 TABLET ORAL at 09:56

## 2019-09-18 RX ADMIN — CALCIUM CARBONATE-VITAMIN D TAB 500 MG-200 UNIT 1 TABLET: 500-200 TAB at 18:04

## 2019-09-18 RX ADMIN — SODIUM CHLORIDE 100 ML/HR: 900 INJECTION, SOLUTION INTRAVENOUS at 14:43

## 2019-09-18 RX ADMIN — POTASSIUM CHLORIDE 20 MEQ: 1.5 POWDER, FOR SOLUTION ORAL at 09:54

## 2019-09-18 RX ADMIN — INSULIN GLARGINE 6 UNITS: 100 INJECTION, SOLUTION SUBCUTANEOUS at 21:37

## 2019-09-18 RX ADMIN — Medication 10 ML: at 21:39

## 2019-09-18 RX ADMIN — INSULIN LISPRO 5 UNITS: 100 INJECTION, SOLUTION INTRAVENOUS; SUBCUTANEOUS at 12:29

## 2019-09-18 RX ADMIN — ACETAMINOPHEN 650 MG: 325 TABLET ORAL at 12:30

## 2019-09-18 RX ADMIN — CARVEDILOL 3.12 MG: 3.12 TABLET, FILM COATED ORAL at 09:56

## 2019-09-18 RX ADMIN — Medication 10 ML: at 06:11

## 2019-09-18 RX ADMIN — INSULIN LISPRO 2 UNITS: 100 INJECTION, SOLUTION INTRAVENOUS; SUBCUTANEOUS at 21:37

## 2019-09-18 RX ADMIN — HEPARIN SODIUM 5000 UNITS: 5000 INJECTION INTRAVENOUS; SUBCUTANEOUS at 21:37

## 2019-09-18 RX ADMIN — FLUPHENAZINE HYDROCHLORIDE 1 MG: 1 TABLET, FILM COATED ORAL at 09:56

## 2019-09-18 RX ADMIN — HEPARIN SODIUM 5000 UNITS: 5000 INJECTION INTRAVENOUS; SUBCUTANEOUS at 14:38

## 2019-09-18 RX ADMIN — CALCIUM CARBONATE-VITAMIN D TAB 500 MG-200 UNIT 1 TABLET: 500-200 TAB at 12:30

## 2019-09-18 RX ADMIN — CALCIUM CARBONATE-VITAMIN D TAB 500 MG-200 UNIT 1 TABLET: 500-200 TAB at 09:55

## 2019-09-18 RX ADMIN — SENNOSIDES, DOCUSATE SODIUM 1 TABLET: 50; 8.6 TABLET, FILM COATED ORAL at 09:56

## 2019-09-18 RX ADMIN — BENZTROPINE MESYLATE 1 MG: 1 TABLET ORAL at 09:56

## 2019-09-18 RX ADMIN — POLYETHYLENE GLYCOL 3350 17 G: 17 POWDER, FOR SOLUTION ORAL at 09:54

## 2019-09-18 RX ADMIN — ACETAMINOPHEN 650 MG: 325 TABLET ORAL at 18:04

## 2019-09-18 RX ADMIN — HEPARIN SODIUM 5000 UNITS: 5000 INJECTION INTRAVENOUS; SUBCUTANEOUS at 06:10

## 2019-09-18 RX ADMIN — PRAVASTATIN SODIUM 20 MG: 10 TABLET ORAL at 21:37

## 2019-09-18 RX ADMIN — INSULIN LISPRO 10 UNITS: 100 INJECTION, SOLUTION INTRAVENOUS; SUBCUTANEOUS at 17:23

## 2019-09-18 RX ADMIN — FERROUS SULFATE TAB 325 MG (65 MG ELEMENTAL FE) 325 MG: 325 (65 FE) TAB at 09:56

## 2019-09-18 NOTE — CONSULTS
Subjective:  
  
          [address]  [phone] Date of  Admission: 9/16/2019 12:42 PM  
 
Admission type:Emergency Ame Rust is a 80 y.o. female admitted for Closed right hip fracture ([facility] Ming Ash. Ms romero has dementia - she reports dizziness prior to a fall that caused a hip fracture. She denies cp/sob. We are asked to see her regarding her PAF and junctional bradycardia. Patient Active Problem List  
 Diagnosis Date Noted  Tachy-kate syndrome ([address] 09/18/2019  
 SSS (sick sinus syndrome) ([address] 09/17/2019  Closed right hip fracture ([address] 09/16/2019  Alzheimer's disease  CHF (congestive heart failure) ([address]  Bradycardia 12/11/2015  Fall as cause of accidental injury in residential institution as place of occurrence 12/11/2015  Elevated troponin 12/11/2015  Hyperlipidemia  Essential hypertension  Dementia  Diabetes ([address]  Paroxysmal atrial fibrillation (HCC)  Short of breath on exertion 08/07/2014 Senthil Torres MD 
Past Medical History:  
Diagnosis Date  Alzheimer's disease  CHF (congestive heart failure) ([address]  Dementia  Diabetes ([address] type II  
 Elevated troponin 12/11/2015  Essential hypertension  Fall as cause of accidental injury in residential institution as place of occurrence 12/11/2015  GERD (gastroesophageal reflux disease)  Heart failure ([address]  Hyperlipidemia  Hyperlipidemia  Hypoglycemia  Ill-defined condition   
 embolism (DVT)  Paroxysmal atrial fibrillation (HCC)  Psychiatric disorder   
 anxiety Past Surgical History:  
Procedure Laterality Date  HX APPENDECTOMY Allergies Allergen Reactions  Influenza Virus Vaccine, Specific Hives  Penicillins Unknown (comments)  Sulfacetamide Unknown (comments) Social History Tobacco Use  Smoking status: Never Smoker  Smokeless tobacco: Never Used Substance Use Topics  Alcohol use: No  
 Drug use: No  
 
History reviewed. No pertinent family history. Current Facility-Administered Medications Medication Dose Route Frequency  insulin lispro (HUMALOG) injection   SubCUTAneous AC&HS  
 0.9% sodium chloride infusion  125 mL/hr IntraVENous CONTINUOUS  
 sodium chloride (NS) flush 5-40 mL  5-40 mL IntraVENous Q8H  
 sodium chloride (NS) flush 5-40 mL  5-40 mL IntraVENous PRN  
 naloxone (NARCAN) injection 0.4 mg  0.4 mg IntraVENous PRN  
 calcium-vitamin D (OS-MAI) 500 mg-200 unit tablet  1 Tab Oral TID WITH MEALS  senna-docusate (PERICOLACE) 8.6-50 mg per tablet 1 Tab  1 Tab Oral BID  polyethylene glycol (MIRALAX) packet 17 g  17 g Oral DAILY  [START ON 9/19/2019] bisacodyl (DULCOLAX) suppository 10 mg  10 mg Rectal DAILY PRN  
 acetaminophen (TYLENOL) tablet 650 mg  650 mg Oral Q6H  
 traMADol (ULTRAM) tablet 50 mg  50 mg Oral Q6H PRN  
 oxyCODONE IR (ROXICODONE) tablet 5 mg  5 mg Oral Q4H PRN  
 famotidine (PEPCID) tablet 20 mg  20 mg Oral Q24H  
 WARFARIN INFORMATION NOTE (COUMADIN)   Other QPM  
 sodium chloride (NS) flush 5-40 mL  5-40 mL IntraVENous Q8H  
 sodium chloride (NS) flush 5-40 mL  5-40 mL IntraVENous PRN  
 acetaminophen (TYLENOL) tablet 650 mg  650 mg Oral Q4H PRN  
 HYDROmorphone (PF) (DILAUDID) injection 0.5 mg  0.5 mg IntraVENous Q4H PRN  
 naloxone (NARCAN) injection 0.4 mg  0.4 mg IntraVENous PRN  
 ondansetron (ZOFRAN) injection 4 mg  4 mg IntraVENous Q4H PRN  
 bisacodyl (DULCOLAX) tablet 5 mg  5 mg Oral DAILY PRN  
 heparin (porcine) injection 5,000 Units  5,000 Units SubCUTAneous Q8H  
 allopurinol (ZYLOPRIM) tablet 100 mg  100 mg Oral DAILY  amLODIPine (NORVASC) tablet 10 mg  10 mg Oral DAILY  benztropine (COGENTIN) tablet 1 mg  1 mg Oral BID  carvedilol (COREG) tablet 3.125 mg  3.125 mg Oral BID WITH MEALS  ferrous sulfate tablet 325 mg  325 mg Oral BID WITH MEALS  
  fluPHENAZine (PROLIXIN) tablet 1 mg  1 mg Oral DAILY  fluPHENAZine (PROLIXIN) tablet 2 mg  2 mg Oral QHS  insulin glargine (LANTUS) injection 6 Units  6 Units SubCUTAneous QHS  memantine (NAMENDA) tablet 10 mg  10 mg Oral Q12H  potassium chloride (KLOR-CON) packet for solution 20 mEq  20 mEq Oral DAILY  pravastatin (PRAVACHOL) tablet 20 mg  20 mg Oral QHS  torsemide (DEMADEX) tablet 100 mg  100 mg Oral DAILY  glucose chewable tablet 16 g  4 Tab Oral PRN  
 glucagon (GLUCAGEN) injection 1 mg  1 mg IntraMUSCular PRN  
 dextrose 10 % infusion 125-250 mL  125-250 mL IntraVENous PRN Review of Symptoms: 
uto due to dementia Subjective:  
  
Visit Vitals /59 (BP 1 Location: Left arm, BP Patient Position: At rest) Pulse 83 Temp 99.7 °F (37.6 °C) Resp 16 Wt 189 lb 9.5 oz (86 kg) SpO2 93% BMI 30.60 kg/m² Physical Exam 
Abdomen: soft, non-tender. Extremities: sp hip surgery, 1+ edema Heart: regular rate and rhythm Lungs: clear to auscultation bilaterally Pulses: 2+ and symmetric Cardiographics Telemetry: PAF, junctional beats Labs: 
Recent Labs  
  09/18/19 
0323 09/17/19 
0312 09/16/19 
1304 WBC  --  8.9 8.9 HGB 10.2* 10.9* 11.7 HCT  --  35.6 37.6 PLT  --  172 173 Recent Labs  
  09/18/19 
0323 09/17/19 
1741 09/17/19 
0312 09/16/19 
1556 09/16/19 
1345   --  138  --  136  
K 3.9  --  3.7  --  3.8   --  101  --  99  
CO2 28  --  32  --  29  
*  --  254*  --  350* BUN 56*  --  44*  --  34* CREA 2.40*  --  1.91*  --  1.63* CA 8.0*  --  8.6  --  8.7 ALB  --   --   --   --  2.5* TBILI  --   --   --   --  0.5 SGOT  --   --   --   --  15 ALT  --   --   --   --  16 INR 1.2* 1.2*  --  1.2*  -- No results for input(s): TROIQ, CPK, CKMB in the last 72 hours. Intake/Output Summary (Last 24 hours) at 9/18/2019 1252 Last data filed at 9/18/2019 1145 Gross per 24 hour Intake 475 ml Output 1045 ml Net -570 ml Assessment: 
 
 Assessment:  
  
 Principal Problem: 
  Closed right [facility] fracture ([facility] (9/16/2019) Active Problems: Hyperlipidemia () Paroxysmal atrial fibrillation (HCC) () Alzheimer's disease () 
 
  SSS (sick sinus syndrome) ([facility] (9/17/2019) Tachy-kate syndrome ([facility] (9/18/2019) Plan:  
 
Alycia Mejia is a pleasantly demented elderly female who reports dizziness prior to a fall. She is not the best historian. She is having small paroxyms of AF and occasional junctional bradycardia on the monitor. I had an extensive discussion with her son (POA) and daughter. Ideally, she would benefit from a pacemaker for her tachy-kate syndrome and that would allow us to medically treat her AF. She has not had any lauren syncope (per her children). They are hesitant to proceed with a pacemaker immediately sp ortho surgery. I asked them to call us immediately if she had any syncope and that she can f/u as outpt for an event monitor. Will sign off. Please call with ?s Chong Clement MD, [facility] 
 
9/18/2019

## 2019-09-18 NOTE — PROGRESS NOTES
Plan of Care 1) SNF to LTC 2) Pt will need 2ND IM letter at time of discharge 3) Pt will need [facility] transportation at time of discharge Reason for Admission:   Closed right hip fracture ([address] RRAT Score: 30 HIGH Resources/supports as identified by patient/family: Pt has a very strong family support system and secondary insurance to assist  
             
Top Challenges facing patient (as identified by patient/family and CM): Finances/Medication cost?  No problems identified Transportation? Pt will need [facility] transportation at time of discharge Support system or lack thereof? No problems identified Living arrangements? No problems identified Self-care/ADLs/Cognition? Alert, oriented but not able to care for self at baseline Current Advanced Directive/Advance Care Plan:  DNR on file, no ACP on file. Plan for utilizing home health:  No appropriate at this time Transition of Care Plan:               
Pt is a 80year old,  [place] female, admitted from [facility]. Prior to admission pt was a LTC resident at [facility] and [address] [place] [address]. CM met with pt and pt son Yolis Kothari in room. Pt son states the family would like for pt to return to [facility] and get rehab and then go back to LTC. CM sent referral, waiting on response. Candelario Jimenez, MSW, CM [facility] [phone]

## 2019-09-18 NOTE — PROGRESS NOTES
Hospitalist Progress Note    NAME: Ashley Mccollum   :  1927   MRN:  [id number]       Assessment / Plan:    Right intertrochanteric hip fracture  · S/p RIGHT FEMUR OPEN REDUCTION INTERNAL FIXATION   · Continue pain management and  DVT prophylaxis  · PT/OT    History of chronic systolic and diastolic heart failure  · Not in exacerbation  · echo REVEALED EF 56-60%/mild AR/mild TR/mild ascending aorta dilatation    History of A. fib  · Rate controlled  · Warfarin was held for surgery  · INR 1.2 today, on warfarin   · INR daily    Hx of Sick sinus syndrome  · Asymptomatic now  · Cardiology following    MIRANDA on CKD stage III  · Likely prerenal   · IVF for now 100 cc/hr   · F/u Cr daily    Chronic kidney disease stage III  · Follow creatinine daily    History of diabetes mellitus type 2  · Sliding scale and home Lantus. When will have time for surgery we will stop all short acting insulin and continue long-acting at 50% of her home dose    History of dementia  · Continue home meds      25.0 - 29.9 Overweight / Body mass index is 30.6 kg/m². Code status: DNR  Prophylaxis: SCD's  Recommended Disposition: TBD     Subjective:     Patient was seen and examined this morning. Family was at bedside. All questions were answered    She states she is not in pain. Review of Systems:  Symptom Y/N Comments  Symptom Y/N Comments   Fever/Chills n   Chest Pain n    Poor Appetite n   Edema n    Cough n   Abdominal Pain n    Sputum n   Joint Pain n    SOB/BUTTS n   Pruritis/Rash n    Nausea/vomit n   Tolerating PT/OT     Diarrhea n   Tolerating Diet     Constipation n   Other       Could NOT obtain due to:      Objective:     VITALS:   Last 24hrs VS reviewed since prior progress note.  Most recent are:  Patient Vitals for the past 24 hrs:   Temp Pulse Resp BP SpO2   19 0742 98.5 °F (36.9 °C) 82 18 141/56 95 %   19 0400 98 °F (36.7 °C) 70 18 137/64 98 %   19 0000 98.2 °F (36.8 °C) 69 18 129/68 99 %   19 1943 98.2 °F (36.8 °C) 73 18 128/65 98 %   09/17/19 1840  83  141/78 97 %   09/17/19 1725  66 16 146/69 99 %   09/17/19 1656 99.2 °F (37.3 °C) 76 18 149/71 97 %   09/17/19 1615 98 °F (36.7 °C) 72 16 139/85 96 %   09/17/19 1605  72 15 162/66 96 %   09/17/19 1600  79 14 154/69 97 %   09/17/19 1556 98.7 °F (37.1 °C) 82 17 161/70 96 %   09/17/19 1555  76 22 176/72 96 %   09/17/19 1253 98.3 °F (36.8 °C) 61 16 129/49 98 %   09/17/19 1127 98.4 °F (36.9 °C) 75 16 135/61 97 %       Intake/Output Summary (Last 24 hours) at 9/18/2019 1012  Last data filed at 9/18/2019 0700  Gross per 24 hour   Intake 475 ml   Output 845 ml   Net -370 ml        PHYSICAL EXAM:  General: WD, WN. Alert, cooperative, no acute distress    EENT:  EOMI. Anicteric sclerae. MMM  Resp:  CTA bilaterally, no wheezing or rales. No accessory muscle use  CV:  Regular  rhythm,  No edema  GI:  Soft, Non distended, Non tender.  +Bowel sounds  Neurologic:  Alert and oriented X 3, normal speech,   Psych:   Good insight. Not anxious nor agitated  Skin:  No rashes. No jaundice    Reviewed most current lab test results and cultures  YES  Reviewed most current radiology test results   YES  Review and summation of old records today    NO  Reviewed patient's current orders and MAR    YES  PMH/SH reviewed - no change compared to H&P  ________________________________________________________________________  Care Plan discussed with:    Comments   Patient x    Family  x    RN x    Care Manager x    Consultant  x ortho                    x Multidiciplinary team rounds were held today with , nursing, pharmacist and clinical coordinator. Patient's plan of care was discussed; medications were reviewed and discharge planning was addressed.      ________________________________________________________________________  Total NON critical care TIME:  39   Minutes    Total CRITICAL CARE TIME Spent:   Minutes non procedure based      Comments   >50% of visit spent in counseling and coordination of care     ________________________________________________________________________  Azar Carvalho MD     Procedures: see electronic medical records for all procedures/Xrays and details which were not copied into this note but were reviewed prior to creation of Plan. LABS:  I reviewed today's most current labs and imaging studies.   Pertinent labs include:  Recent Labs     09/18/19  0323 09/17/19  0312 09/16/19  1304   WBC  --  8.9 8.9   HGB 10.2* 10.9* 11.7   HCT  --  35.6 37.6   PLT  --  172 173     Recent Labs     09/18/19  0323 09/17/19  1741 09/17/19 0312 09/16/19  1556 09/16/19  1345     --  138  --  136   K 3.9  --  3.7  --  3.8     --  101  --  99   CO2 28  --  32  --  29   *  --  254*  --  350*   BUN 56*  --  44*  --  34*   CREA 2.40*  --  1.91*  --  1.63*   CA 8.0*  --  8.6  --  8.7   ALB  --   --   --   --  2.5*   TBILI  --   --   --   --  0.5   SGOT  --   --   --   --  15   ALT  --   --   --   --  16   INR 1.2* 1.2*  --  1.2*  --        Signed: Azar Carvalho MD

## 2019-09-18 NOTE — DIABETES MGMT
[facility] Progress Note Recommendations/ Comments: If appropriate, please consider: 
1) increasing pt's lantus to 9 units (80% of pta dose). Reassess in 2 days and then, consider titrating by 1 unit q 3 days with current renal status. 2) start Tradjenta 5 mg daily (formulary replacement for Januvia without renal dosing adjustments and glucose mediated) to address increasing BG throughout the day. Msg sent to SWAPNIL Meraz NP. Current hospital DM medication: Lantus 6 units, adding Lispro correctional insulin - normal sensitivity. Chart reviewed on Karin Isidro due to hyperglycemia with fasting labs >220 mg/dl. Patient is a 80 y.o. female with known Type 2 Diabetes on Lantus 12 units and Januvia 50 mg daily am and [employer] and  [address] A1c:  
Lab Results Component Value Date/Time Hemoglobin A1c, External 7.4 01/14/2016 06:40 AM  
 Hemoglobin A1c, External 7.3 04/24/2015 11:36 AM  
 
 
Recent Glucose Results:  
Lab Results Component Value Date/Time  (H) 09/18/2019 03:23 AM  
 GLUCPOC 358 (H) 09/18/2019 11:39 AM  
 GLUCPOC 221 (H) 09/18/2019 07:45 AM  
 GLUCPOC 188 (H) 09/17/2019 09:01 PM  
  
 
Lab Results Component Value Date/Time Creatinine 2.40 (H) 09/18/2019 03:23 AM  
 
CrCl cannot be calculated (Unknown ideal weight. ). Active Orders Diet DIET DIABETIC CONSISTENT CARB Regular PO intake: No data found. Will continue to follow as needed. Thank you Irene Xie RD CDE [facility] Time spent: 10 minutes

## 2019-09-18 NOTE — PROGRESS NOTES
Ortho/ NeuroSurgery NP Note POD# 1  s/p RIGHT FEMUR OPEN REDUCTION INTERNAL FIXATION Pt resting in bed, with family at bedside. States that pain has improved since surgery. VSS Afebrile. Patient has had something to eat/drink. No nausea. Most Recent Labs:  
Lab Results Component Value Date/Time HGB 10.2 (L) 09/18/2019 03:23 AM  
 Hemoglobin A1c, External 7.4 01/14/2016 06:40 AM  
 
 
Body mass index is 30.6 kg/m². Reference: BMI greater than 30 is classified as obesity and greater than 40 is classified as morbid obesity. Voiding status: flannery in place Dressing c.d.i, cryotherapy in place Calves soft and supple; No pain with passive stretch Sensation and motor intact SCDs for mechanical DVT proph Plan: 
1) PT BID starting tomorrow 2) Rachel-op Antibiotics Ancef 3) Coumadin restarted to be dosed by pharmacy  for DVT Prophylaxis- INR 1.2 on morning labs. 4) Pepcid for GI Prophylaxis 5) Increased creatine of 2.40 from 1.91- will defer to primary team for management 6) Discharge plans pending progression with therapy and medical readiness per primary team, will need SNF Readiness for discharge: 
   [x] Vital Signs stable  
 [x] Hgb stable  
 [] + Voiding- plan to remove flannery today  
 [x] Incision intact, drainage minimal  
 [x] Tolerating PO intake   
 [] Cleared by PT (OT if applicable) [] Stair training completed (if applicable) [] Independent/Contact Guard ambulation with assistive device (household distance) [] Bed mobility [] Car transfers  
  [] ADLs [x] Adequate pain control on oral medication alone Mary Bolaños NP

## 2019-09-18 NOTE — PROGRESS NOTES
Problem: Mobility Impaired (Adult and Pediatric) Goal: *Acute Goals and Plan of Care (Insert Text) Description FUNCTIONAL STATUS PRIOR TO ADMISSION: Patient was modified independent using a Single point cane for functional mobility. HOME SUPPORT PRIOR TO ADMISSION: The patient lived in assisted living and required assistance for bathing and dressing. Physical Therapy Goals Initiated 9/18/2019 1. Patient will move from supine to sit and sit to supine  in bed with moderate assistance  within 7 day(s). 2.  Patient will transfer from bed to chair and chair to bed with maximal assistance using the least restrictive device within 7 day(s). 3.  Patient will perform sit to stand with moderate assistance  within 7 day(s). 4.  Patient will ambulate with maximal assistance for 10 feet with the least restrictive device within 7 day(s). Outcome: Progressing Towards Goal 
PHYSICAL THERAPY EVALUATION Patient: Brian Dejesus (26 y.o. female) Date: 9/18/2019 Primary Diagnosis: Closed right hip fracture ([address] [S72.001A] Procedure(s) (LRB): 
RIGHT FEMUR OPEN REDUCTION INTERNAL FIXATION (Right) 1 Day Post-Op Precautions:   TTWB(right) ASSESSMENT Based on the objective data described below, the patient presents with a hx of dementia and now impaired strength, ROM, initiation of mobility, balance, and impaired compliance with WB restrictions. Patient required physical assistance to initiate all mobility during evaluation and was unaware of why she was admitted to the hospital. She was unable to maintain TTWB in stance at edge of bed and required 2 attempts before she was able to clear her buttocks to stand. Returned to supine in NAD d/t her inability to stand with TTWB. She typically lives in an assisted and recommend discharge to rehab in her home facility to maximize her functional recovery. Current Level of Function Impacting Discharge (mobility/balance): max-total assist x2 for all mobility Patient will benefit from skilled therapy intervention to address the above noted impairments. PLAN : 
Recommendations and Planned Interventions: bed mobility training, transfer training, gait training, therapeutic exercises and neuromuscular re-education Frequency/Duration: Patient will be followed by physical therapy:  daily to address goals. Recommendation for discharge: (in order for the patient to meet his/her long term goals) Therapy up to 5 days/week in SNF setting This discharge recommendation: 
Has not yet been discussed the attending provider and/or case management Equipment recommendations for successful discharge (if) home: to be determined by rehab facility SUBJECTIVE:  
Patient stated Love Merl does it hurt?  OBJECTIVE DATA SUMMARY:  
HISTORY:   
Past Medical History:  
Diagnosis Date Alzheimer's disease CHF (congestive heart failure) ([facility] Dementia Diabetes ([facility] [address] type II Elevated troponin 12/11/2015 Essential hypertension Fall as cause of accidental injury in residential institution as place of occurrence 12/11/2015 GERD (gastroesophageal reflux disease) Heart failure ([facility] [address] Hyperlipidemia Hyperlipidemia Hypoglycemia Ill-defined condition   
 embolism (DVT) Paroxysmal atrial fibrillation (HCC) Psychiatric disorder   
 anxiety Past Surgical History:  
Procedure Laterality Date HX APPENDECTOMY Personal factors and/or comorbidities impacting plan of care:  
 
Home Situation Home Environment: Long term care([facility]) One/Two Story Residence: One story Living Alone: No 
Support Systems: Family member(s), Skilled nursing facility Patient Expects to be Discharged to[de-identified] Rehabilitation facility Current DME Used/Available at Home: Wheelchair, Renee [place], [address] Tub or Shower Type: Shower EXAMINATION/PRESENTATION/DECISION MAKING:  
Critical Behavior: 
Neurologic State: Alert, Confused Orientation Level: Disoriented to person Cognition: Follows commands Hearing: Auditory Auditory Impairment: None Skin:   
Edema:  
Range Of Motion: 
AROM: Generally decreased, functional(impaired R>L shoulder flex) Strength:   
Strength: Generally decreased, functional 
  
  
  
  
  
  
Tone & Sensation:  
Tone: Normal 
  
  
  
  
  
  
  
  
   
Coordination: 
Coordination: Within functional limits Vision:  
  
Functional Mobility: 
Bed Mobility: 
Rolling: Total assistance Supine to Sit: Total assistance;Assist x2 Sit to Supine: Total assistance;Assist x2 Scooting: Maximum assistance Transfers: 
Sit to Stand: Maximum assistance;Assist x2 Stand to Sit: Maximum assistance Balance:  
Sitting: Intact Standing: Impaired Standing - Static: Fair Standing - Dynamic : Poor Ambulation/Gait Training: 
Right Side Weight Bearing: Toe touch Standing at bedside with RW and max assist x1-2 Stairs: Therapeutic Exercises:  
Supine ankle pumps, hip ER/IR, hip abduction/adduction x10 Functional Measure: 
 
 
  
Physical Therapy Evaluation Charge Determination History Examination Presentation Decision-Making MEDIUM  Complexity : 1-2 comorbidities / personal factors will impact the outcome/ POC  MEDIUM Complexity : 3 Standardized tests and measures addressing body structure, function, activity limitation and / or participation in recreation  LOW Complexity : Stable, uncomplicated  LOW Complexity : FOTO score of  Based on the above components, the patient evaluation is determined to be of the following complexity level: LOW Pain Rating: 
Unrated. No pain in supine and c/o strong in sitting. Activity Tolerance:  
Fair Please refer to the flowsheet for vital signs taken during this treatment. After treatment patient left in no apparent distress:  
Supine in bed, Call bell within reach and Caregiver / family present COMMUNICATION/EDUCATION:  
The patients plan of care was discussed with: Registered Nurse. Fall prevention education was provided and the patient/caregiver indicated understanding., Patient/family have participated as able in goal setting and plan of care. and Patient/family agree to work toward stated goals and plan of care. Thank you for this referral. 
Gloria Joseph, PT, DPT Time Calculation: 34 mins

## 2019-09-18 NOTE — PROGRESS NOTES
Initial Nutrition Assessment:    INTERVENTIONS/RECOMMENDATIONS:   · Continue consistent carb diet  · Glucerna TID (vanilla)     ASSESSMENT:   Chart reviewed, medically noted for s/p RIGHT FEMUR OPEN REDUCTION INTERNAL FIXATION and PMH shown below. Pt was in and out of sleep during visit this morning. Family member was present who agreed with trying glucerna shakes to help meet nutrition needs. Will monitor PO intake. Past Medical History:   Diagnosis Date    Alzheimer's disease     [facility]estive [facility] failure) [facility]     Dementia     Diabetes ([facility] [address]     type II    Elevated troponin 2015    Essential hypertension     Fall as cause of accidental injury in residential institution as place of occurrence 2015    GERD (gastroesophageal reflux disease)     Heart failure (HCC)     Hyperlipidemia     Hyperlipidemia     Hypoglycemia     Ill-defined condition     embolism (DVT)    Paroxysmal atrial fibrillation (HCC)     Psychiatric disorder     anxiety       Diet Order: Consistent carb  % Eaten:  No data found.   Pertinent Medications: [x]Reviewed: Os-michael, pepcid, Fe, lantus, humalog, miralax, KCl, pericolace, warfarin  Pertinent Labs: [x]Reviewed: B-277,   Food Allergies: [x]NKFA  []Other   Last BM:   Edema:   n/a     []RUE   []LUE   []RLE   []LLE      Pressure Injury:   n/a   [] Stage I   [] Stage II   [] Stage III   [] Stage IV      Wt Readings from Last 30 Encounters:   19 84 kg (185 lb 3 oz)   17 85.4 kg (188 lb 4.8 oz)   12/11/15 90.7 kg (200 lb)   14 86 kg (189 lb 9.6 oz)   14 96.1 kg (211 lb 12.8 oz)       Anthropometrics:   Height:   Weight: 84 kg (185 lb 3 oz)   IBW (%IBW):   ( ) UBW (%UBW):   (  %)   Last Weight Metrics:  Weight Loss Metrics 2015   Today's Wt 185 lb 3 oz - 188 lb 4.8 oz 200 lb 189 lb 9.6 oz 211 lb 12.8 oz   BMI - 29.89 kg/m2 30.39 kg/m2 32.3 kg/m2 33.59 kg/m2 -       BMI: Body mass index is 29.89 kg/m². This BMI is indicative of:   []Underweight    []Normal    [x]Overweight    [] Obesity   [] Extreme Obesity (BMI>40)     Estimated Nutrition Needs (Based on):   1675 Kcals/day(20 kcal/kg) , 90 g(1.1 g/kg) Protein  Carbohydrate: At Least 130 g/day  Fluids: 1675 mL/day (1ml/kcal) or per primary team    NUTRITION DIAGNOSES:   Problem:  Increased nutrient needs      Etiology: related to fracture and post-op healing     Signs/Symptoms: as evidenced by s/p RIGHT FEMUR OPEN REDUCTION INTERNAL FIXATION       NUTRITION INTERVENTIONS:  Meals/Snacks: General/healthful diet   Supplements: Commercial supplement              GOAL:   consume >50% of meals and ONS while stabilizing BG <180 mg/dL in 3-5 days    LEARNING NEEDS (Diet, Food/Nutrient-Drug Interaction):    [x] None Identified   [] Identified and Education Provided/Documented   [] Identified and Pt declined/was not appropriate     Cultureal, Faith, OR Ethnic Dietary Needs:    [x] None Identified   [] Identified and Addressed     [x] Interdisciplinary Care Plan Reviewed/Documented    [x] Discharge Planning: Consistent carb diet with adequate kcal and protein to promote fracture healing       MONITORING /EVALUATION:      Food/Nutrient Intake Outcomes:  Total energy intake  Physical Signs/Symptoms Outcomes: Weight/weight change, Electrolyte and renal profile, Glucose profile, GI    NUTRITION RISK:    [] High              [x] Moderate           []  Low  []  Minimal/Uncompromised    PT SEEN FOR:    [x]  MD Consult: []Calorie Count      []Diabetic Diet Education        []Diet Education     []Electrolyte Management     [x]General Nutrition Management and Supplements     []Management of Tube Feeding     []TPN Recommendations    []  RN Referral:  []MST score >=2     []Enteral/Parenteral Nutrition PTA     []Pregnant: Gestational DM or Multigestation     []Pressure Ulcer/Wound Care needs        []  Low BMI  []  LOS Referral       Whit Jacobsen RDN  Pager [phone]  Weekend Pager [phone]

## 2019-09-18 NOTE — PROGRESS NOTES
Problem: Mobility Impaired (Adult and Pediatric) Goal: *Acute Goals and Plan of Care (Insert Text) Description FUNCTIONAL STATUS PRIOR TO ADMISSION: Patient was modified independent using a Single point cane for functional mobility. HOME SUPPORT PRIOR TO ADMISSION: The patient lived in assisted living and required assistance for bathing and dressing. Physical Therapy Goals Initiated 9/18/2019 1. Patient will move from supine to sit and sit to supine  in bed with moderate assistance  within 7 day(s). 2.  Patient will transfer from bed to chair and chair to bed with maximal assistance using the least restrictive device within 7 day(s). 3.  Patient will perform sit to stand with moderate assistance  within 7 day(s). 4.  Patient will ambulate with maximal assistance for 10 feet with the least restrictive device within 7 day(s). 9/18/2019 1607 by Mandi Allen PT, DPT Outcome: Progressing Towards Goal 
9/18/2019 1301 by Mandi Allen PT, DPT Outcome: Progressing Towards Goal 
  
PHYSICAL THERAPY TREATMENT Patient: Earnestine Holguin (1 y.o. female) Date: 9/18/2019 Diagnosis: Closed right hip fracture ([address] [S72.001A] Closed right hip fracture ([address] Procedure(s) (LRB): 
RIGHT FEMUR OPEN REDUCTION INTERNAL FIXATION (Right) 1 Day Post-Op Precautions: TTWB(right) Chart, physical therapy assessment, plan of care and goals were reviewed. ASSESSMENT Deferred attempting to stand again this pm d/t difficulty maintaining NWB this am. Elected to complete right hip exercises instead. Noted improved command following for the PM and more patient participation than active assistive ROM. Continue to recommend SNF level rehab when medically stable. Other factors to consider for discharge: baseline dementia PLAN : 
Patient continues to benefit from skilled intervention to address the above impairments. Continue treatment per established plan of care. to address goals. Recommendation for discharge: (in order for the patient to meet his/her long term goals) Therapy up to 5 days/week in SNF setting Equipment recommendations for successful discharge (if) home: to be determined by rehab facility SUBJECTIVE:  
Patient stated Comfort Smith are my shoes? Marnie Darinel OBJECTIVE DATA SUMMARY:  
Critical Behavior: 
Neurologic State: Alert, Confused Orientation Level: Oriented to person, Disoriented to time, Disoriented to situation, Disoriented to place Cognition: Follows commands, Memory loss Safety/Judgement: Fall prevention Functional Mobility Training: 
 
 
Therapeutic Exercises:  
SUPINE 
EXERCISES Sets Reps Active Active Assist  
Passive Self ROM Comments Ankle Pumps 1 10 ?                                           ?                                           ?                                           ?                                             
Quad Sets 1 10 ?                                           ?                                           ?                                           ?                                             
Heel Slides 1 10 ?                                           ?                                           ?                                           ?                                             
Hip Abduction 1 10 ?                                           ?                                           ?                                           ?                                             
Hip External Rotation 1 10 ?                                           ?                                           ?                                           ?                                             
Glut Sets   ?                                            ?                                           ?                                           ?                                             
 ?                                           ?                                           ?                                           ?                                             
   ?                                           ?                                           ?                                           ?                                             
 
 
Pain Rating: 
 
 
Activity Tolerance:  
Good Please refer to the flowsheet for vital signs taken during this treatment. After treatment patient left in no apparent distress:  
Supine in bed, Call bell within reach and Bed / chair alarm activated COMMUNICATION/COLLABORATION:  
The patients plan of care was discussed with: Registered Nurse Kelly Avina, PT, DPT Time Calculation: 10 mins

## 2019-09-18 NOTE — PROGRESS NOTES
Problem: Self Care Deficits Care Plan (Adult) Goal: *Acute Goals and Plan of Care (Insert Text) Description FUNCTIONAL STATUS PRIOR TO ADMISSION: Family reports pt can walk to dining hernandez with [facility] or , has assistance for bathing and dressing. Can feed and groom herself HOME SUPPORT: Pt is LTC resident at [facility], has supportive family. Occupational Therapy Goals Initiated 9/18/2019 1. Patient will perform grooming with supervision/set-up sitting EOB within 7 day(s). 2.  Patient will perform toilet transfers with maximal assistance to [facility] maintaining TTWB within 7 day(s). 3.  Patient will perform all aspects of toileting with maximal assistance within 7 day(s). 4.  Patient will participate in upper extremity therapeutic exercise/activities with minimal assistance for 3 minutes within 7 day(s). Outcome: Progressing Towards Goal 
 OCCUPATIONAL THERAPY EVALUATION Patient: Yanira Leung (58 y.o. female) Date: 9/18/2019 Primary Diagnosis: Closed right hip fracture ([facility] [address] [S72.001A] Procedure(s) (LRB): 
RIGHT FEMUR OPEN REDUCTION INTERNAL FIXATION (Right) 1 Day Post-Op Precautions:   TTWB(right) ASSESSMENT Based on the objective data described below, the patient presents with TTWB RLE, R hip pain, impaired functional mobility, decreased balance, endurance and strength and baseline dementia following admission for R hip fracture, POD 1 R femur ORIF. At baseline pt lives at [facility], is ambulatory with cane or RW, has assistance for ADLs. Received semisupine in bed, oriented to self only with little verbalization during session, family present. Limited B shoulder flexion and generally decreased strength. Performed grooming ADL in semisupine with setup and verbal/visual cueing. Total A for supine>sit, pt with fair-good sitting balance that improved to CGA.  Stood to [employer] with max A x2, pt unable to maintain to TTWB or take steps. Returned to bed at end of session, VSS throughout. Current Level of Function Impacting Discharge (ADLs/self-care): setup-max A UB ADLs, total A LB ADLs, max-total A x2 for bed mobility and sit>stand Functional Outcome Measure: The patient scored 10/100 on the Barthel Index outcome measure which is indicative of significant impairment in ADL performance. Pt unable to maintain TTWB this session and resistant to RLE movement Other factors to consider for discharge: Baseline dementia Patient will benefit from skilled therapy intervention to address the above noted impairments. PLAN : 
Recommendations and Planned Interventions: self care training, functional mobility training, therapeutic exercise, balance training, therapeutic activities, endurance activities, patient education, home safety training and family training/education Frequency/Duration: Patient will be followed by occupational therapy 4 times a week to address goals. Recommendation for discharge: (in order for the patient to meet his/her long term goals) Therapy up to 5 days/week in SNF setting This discharge recommendation: 
Has been made in collaboration with the attending provider and/or case management Equipment recommendations for successful discharge (if) home: to be determined by rehab facility SUBJECTIVE:  
Patient stated My leg hurts.  OBJECTIVE DATA SUMMARY:  
HISTORY:  
Past Medical History:  
Diagnosis Date  Alzheimer's disease  CHF (congestive heart failure) ([facility]  Dementia  Diabetes ([facility] [address] type II  
 Elevated troponin 12/11/2015  Essential hypertension  Fall as cause of accidental injury in residential institution as place of occurrence 12/11/2015  GERD (gastroesophageal reflux disease)  Heart failure ([facility] [address]  Hyperlipidemia  Hyperlipidemia  Hypoglycemia  Ill-defined condition   
 embolism (DVT)  Paroxysmal atrial fibrillation (HCC)  Psychiatric disorder   
 anxiety Past Surgical History:  
Procedure Laterality Date  HX APPENDECTOMY Expanded or extensive additional review of patient history:  
 
Home Situation Home Environment: Long term care([facility]) One/Two Story Residence: One story Living Alone: No 
Support Systems: Family member(s), Skilled nursing facility Patient Expects to be Discharged to[de-identified] Rehabilitation facility Current DME Used/Available at Home: Wheelchair, Perla Rising, [address] Tub or Shower Type: Shower Hand dominance: Right EXAMINATION OF PERFORMANCE DEFICITS: 
Cognitive/Behavioral Status: 
Neurologic State: Alert;Confused Orientation Level: Oriented to person;Disoriented to time;Disoriented to situation;Disoriented to place Cognition: Follows commands;Memory loss Perception: Appears intact Perseveration: No perseveration noted Safety/Judgement: Fall prevention Hearing: Auditory Auditory Impairment: None Vision/Perceptual:   
    
    
Acuity: Within Defined Limits Range of Motion: 
AROM: Generally decreased, functional(impaired R>L shoulder flex) PROM: Generally decreased, functional 
  
 
Strength: 
Strength: Generally decreased, functional 
  
 
Coordination: 
Coordination: Within functional limits Fine Motor Skills-Upper: Left Intact; Right Intact Gross Motor Skills-Upper: Right Impaired;Left Impaired(decreased B shoulder flexion) Tone & Sensation: 
Tone: Normal 
Sensation: Intact Balance: 
Sitting: Intact Standing: Impaired Standing - Static: Fair Standing - Dynamic : Poor Functional Mobility and Transfers for ADLs: 
Bed Mobility: 
Rolling: Total assistance Supine to Sit: Total assistance;Assist x2 Sit to Supine: Total assistance;Assist x2 Scooting: Maximum assistance Transfers: 
Sit to Stand: Maximum assistance;Assist x2 Stand to Sit: Maximum assistance ADL Assessment: 
Feeding: Minimum assistance Oral Facial Hygiene/Grooming: Minimum assistance Bathing: Total assistance Upper Body Dressing: Maximum assistance Lower Body Dressing: Total assistance Toileting: Total assistance ADL Intervention and task modifications: 
  
 
Grooming Washing Face: Supervision;Set-up(semisupine, increased time) Cues: Verbal cues provided;Visual cues provided Lower Body Dressing Assistance Socks: Total assistance (dependent) Cognitive Retraining Safety/Judgement: Fall prevention Functional Measure: 
Barthel Index: 
 
Bathin Bladder: 0 Bowels: 5 Groomin Dressin Feedin Mobility: 0 Stairs: 0 Toilet Use: 0 Transfer (Bed to Chair and Back): 0 Total: 10/100 The Barthel ADL Index: Guidelines 1. The index should be used as a record of what a patient does, not as a record of what a patient could do. 2. The main aim is to establish degree of independence from any help, physical or verbal, however minor and for whatever reason. 3. The need for supervision renders the patient not independent. 4. A patient's performance should be established using the best available evidence. Asking the patient, friends/relatives and nurses are the usual sources, but direct observation and common sense are also important. However direct testing is not needed. 5. Usually the patient's performance over the preceding 24-48 hours is important, but occasionally longer periods will be relevant. 6. Middle categories imply that the patient supplies over 50 per cent of the effort. 7. Use of aids to be independent is allowed. Paralee [place]., Barthel, DEdilbertoW. (4250). Functional evaluation: the Barthel Index. [address] [facility] (14)2. DAO Adler, Brittney Samaniego., [facility], [address] [place] [address] ().  Measuring the change indisability after inpatient rehabilitation; comparison of the responsiveness of the Barthel Index and Functional [place] Measure. Journal of Neurology, Neurosurgery, and Psychiatry, 66(4), 932-040. DEBBIE Rivera.DONN, YESSENIA Hassan, & Natalie Xie M.A. (2004.) Assessment of post-stroke quality of life in cost-effectiveness studies: The usefulness of the Barthel Index and the EuroQoL-5D. [facility], 13, 745-26 Occupational Therapy Evaluation Charge Determination History Examination Decision-Making MEDIUM Complexity : Expanded review of history including physical, cognitive and psychosocial  history  MEDIUM Complexity : 3-5 performance deficits relating to physical, cognitive , or psychosocial skils that result in activity limitations and / or participation restrictions MEDIUM Complexity : Patient may present with comorbidities that affect occupational performnce. Miniml to moderate modification of tasks or assistance (eg, physical or verbal ) with assesment(s) is necessary to enable patient to complete evaluation Based on the above components, the patient evaluation is determined to be of the following complexity level: MEDIUM Pain Rating: 
Pt did not rate pain but indicated pain with movement of RLE Activity Tolerance:  
Fair Please refer to the flowsheet for vital signs taken during this treatment. After treatment patient left in no apparent distress:   
Supine in bed, Call bell within reach and Caregiver / family present COMMUNICATION/EDUCATION:  
The patients plan of care was discussed with: Physical Therapist and Registered Nurse. Home safety education was provided and the patient/caregiver indicated understanding., Patient/family have participated as able in goal setting and plan of care. and Patient/family agree to work toward stated goals and plan of care. This patients plan of care is appropriate for delegation to [facility]. Thank you for this referral. 
Willy Lopez, OT Time Calculation: 37 mins

## 2019-09-18 NOTE — PROGRESS NOTES
Spiritual Care Partner Volunteer visited patient in PCU on September 18. 2019. Documented by: 
 HAILY Kendrick, [facility],  [facility]  Paging Service  [phone])

## 2019-09-18 NOTE — PROGRESS NOTES
9/18/2019 11:28 AM 
 
Admit Date: 9/16/2019 Admit Diagnosis:  
Closed right hip fracture ([address] [place] Subjective:  
 
Ame Rust denies chest pain or shortness of breath. Her son said that at one point she mentioned chest discomfort, but does not remember. I discussed with family who confirmed that they would only want medical management if recurrent pain, no invasive ischemic evaluation. Current Facility-Administered Medications Medication Dose Route Frequency  0.9% sodium chloride infusion  125 mL/hr IntraVENous CONTINUOUS  
 sodium chloride (NS) flush 5-40 mL  5-40 mL IntraVENous Q8H  
 sodium chloride (NS) flush 5-40 mL  5-40 mL IntraVENous PRN  
 naloxone (NARCAN) injection 0.4 mg  0.4 mg IntraVENous PRN  
 calcium-vitamin D (OS-MAI) 500 mg-200 unit tablet  1 Tab Oral TID WITH MEALS  senna-docusate (PERICOLACE) 8.6-50 mg per tablet 1 Tab  1 Tab Oral BID  polyethylene glycol (MIRALAX) packet 17 g  17 g Oral DAILY  [START ON 9/19/2019] bisacodyl (DULCOLAX) suppository 10 mg  10 mg Rectal DAILY PRN  
 acetaminophen (TYLENOL) tablet 650 mg  650 mg Oral Q6H  
 traMADol (ULTRAM) tablet 50 mg  50 mg Oral Q6H PRN  
 oxyCODONE IR (ROXICODONE) tablet 5 mg  5 mg Oral Q4H PRN  
 famotidine (PEPCID) tablet 20 mg  20 mg Oral Q24H  
 WARFARIN INFORMATION NOTE (COUMADIN)   Other QPM  
 sodium chloride (NS) flush 5-40 mL  5-40 mL IntraVENous Q8H  
 sodium chloride (NS) flush 5-40 mL  5-40 mL IntraVENous PRN  
 acetaminophen (TYLENOL) tablet 650 mg  650 mg Oral Q4H PRN  
 HYDROmorphone (PF) (DILAUDID) injection 0.5 mg  0.5 mg IntraVENous Q4H PRN  
 naloxone (NARCAN) injection 0.4 mg  0.4 mg IntraVENous PRN  
 ondansetron (ZOFRAN) injection 4 mg  4 mg IntraVENous Q4H PRN  
 bisacodyl (DULCOLAX) tablet 5 mg  5 mg Oral DAILY PRN  
 heparin (porcine) injection 5,000 Units  5,000 Units SubCUTAneous Q8H  
 allopurinol (ZYLOPRIM) tablet 100 mg  100 mg Oral DAILY  amLODIPine (NORVASC) tablet 10 mg  10 mg Oral DAILY  benztropine (COGENTIN) tablet 1 mg  1 mg Oral BID  carvedilol (COREG) tablet 3.125 mg  3.125 mg Oral BID WITH MEALS  ferrous sulfate tablet 325 mg  325 mg Oral BID WITH MEALS  fluPHENAZine (PROLIXIN) tablet 1 mg  1 mg Oral DAILY  fluPHENAZine (PROLIXIN) tablet 2 mg  2 mg Oral QHS  insulin glargine (LANTUS) injection 6 Units  6 Units SubCUTAneous QHS  memantine (NAMENDA) tablet 10 mg  10 mg Oral Q12H  potassium chloride (KLOR-CON) packet for solution 20 mEq  20 mEq Oral DAILY  pravastatin (PRAVACHOL) tablet 20 mg  20 mg Oral QHS  torsemide (DEMADEX) tablet 100 mg  100 mg Oral DAILY  glucose chewable tablet 16 g  4 Tab Oral PRN  
 glucagon (GLUCAGEN) injection 1 mg  1 mg IntraMUSCular PRN  
 insulin lispro (HUMALOG) injection   SubCUTAneous AC&HS  
 dextrose 10 % infusion 125-250 mL  125-250 mL IntraVENous PRN Objective:  
  
Physical Exam:   
Visit Vitals /58 (BP 1 Location: Left arm, BP Patient Position: At rest) Pulse 82 Temp 98.5 °F (36.9 °C) Resp 18 Wt 189 lb 9.5 oz (86 kg) SpO2 95% BMI 30.60 kg/m² Gen:  NAD Mental Status - Alert. General Appearance - Not in acute distress. Chest and Lung Exam  
Inspection: Accessory muscles - No use of accessory muscles in breathing. Auscultation:  
Breath sounds: - Normal.  
Cardiovascular Inspection: Jugular vein - Bilateral - Inspection Normal.  
Palpation/Percussion:  
Apical Impulse: - Normal.  
Auscultation: Rhythm - Regular with ectopics. Heart Sounds - S1 WNL and S2 WNL. No S3 or S4. Murmurs & Other Heart Sounds: Auscultation of the heart reveals - No Murmurs. Peripheral Vascular Upper Extremity: Inspection - Bilateral - No Cyanotic nailbeds or Digital clubbing. Lower Extremity:  
Palpation: Edema - Bilateral - No edema. Abdomen:   Soft, non-tender, bowel sounds are active. Neuro: A&O times 3, CN and motor grossly WNL Data Review:  
Recent Labs  
  09/18/19 
0323 09/17/19 
0312 09/16/19 
1304 WBC  --  8.9 8.9 HGB 10.2* 10.9* 11.7 HCT  --  35.6 37.6 PLT  --  172 173 Recent Labs  
  09/18/19 
0323 09/17/19 
1741 09/17/19 
0312 09/16/19 
1556 09/16/19 
1345   --  138  --  136  
K 3.9  --  3.7  --  3.8   --  101  --  99  
CO2 28  --  32  --  29  
*  --  254*  --  350* BUN 56*  --  44*  --  34* CREA 2.40*  --  1.91*  --  1.63* CA 8.0*  --  8.6  --  8.7 ALB  --   --   --   --  2.5* TBILI  --   --   --   --  0.5 SGOT  --   --   --   --  15 ALT  --   --   --   --  16 INR 1.2* 1.2*  --  1.2*  -- No results for input(s): TROIQ, CPK, CKMB in the last 72 hours. Intake/Output Summary (Last 24 hours) at 9/18/2019 1128 Last data filed at 9/18/2019 0700 Gross per 24 hour Intake 475 ml Output 845 ml Net -370 ml Telemetry: normal sinus rhythm, pac's. some junctional beats rare short runs of atrial ectopy Assessment:  
 
Principal Problem: 
  Closed right hip fracture ([facility] [address] (9/16/2019) Active Problems: Hyperlipidemia () Paroxysmal atrial fibrillation (HCC) () Alzheimer's disease () 
 
  [facility] (9/17/2019) Plan:  
 
Enedelia Solomon denies chest pain or shortness of breath. Her son said that at one point she mentioned chest discomfort, but does not remember. I discussed with family who confirmed that they would only want medical management if recurrent pain, no invasive ischemic evaluation. Hip fracture s/p fall. Cardiology consulted for pre op clearance. EKG with a fib. INR 1.2  Creat 1.91. Tele review with 2nd degree type 1 AVB, some junctional beats, and some brief atrial ectopy (very low burden, <30 beats in 24 hours). · ECHO with EF 56-60% and no significant structural heart disease · Tele review shows brief atrial ectopy (not enough AF to consider [facility]), AVB, and junctional beats likely representing SSS, which would normally be indication for a pacemaker. · Tolerated surgery · Consulting EP today to see if SSS concerning enough to consider pacemaker- discussed with Dr. Rehana eHrrera

## 2019-09-18 NOTE — PROGRESS NOTES
S: No complaints, no acute events. O:  
Visit Vitals /56 (BP 1 Location: Left arm, BP Patient Position: At rest) Pulse 82 Temp 98.5 °F (36.9 °C) Resp 18 Wt 189 lb 9.5 oz (86 kg) SpO2 95% BMI 30.60 kg/m² Dressing C/D/I Sensation intact L1-S1 
TA/GCS/EHL: 5/5 Capillary refill less than 2 seconds. A: POD1 ORIF right hip P: 
PT,TTWB 
DVT prophylaxis D/C planning

## 2019-09-19 LAB
ERYTHROCYTE [DISTWIDTH] IN BLOOD BY AUTOMATED COUNT: 15.7 % (ref 11.5–14.5)
GLUCOSE BLD STRIP.AUTO-MCNC: 264 MG/DL (ref 65–100)
GLUCOSE BLD STRIP.AUTO-MCNC: 271 MG/DL (ref 65–100)
GLUCOSE BLD STRIP.AUTO-MCNC: 275 MG/DL (ref 65–100)
GLUCOSE BLD STRIP.AUTO-MCNC: 357 MG/DL (ref 65–100)
HCT VFR BLD AUTO: 31.9 % (ref 35–47)
HGB BLD-MCNC: 9.9 G/DL (ref 11.5–16)
INR PPP: 1.5 (ref 0.9–1.1)
MCH RBC QN AUTO: 23.9 PG (ref 26–34)
MCHC RBC AUTO-ENTMCNC: 31 G/DL (ref 30–36.5)
MCV RBC AUTO: 77.1 FL (ref 80–99)
NRBC # BLD: 0 K/UL (ref 0–0.01)
NRBC BLD-RTO: 0 PER 100 WBC
PLATELET # BLD AUTO: 169 K/UL (ref 150–400)
PMV BLD AUTO: 12.7 FL (ref 8.9–12.9)
PROTHROMBIN TIME: 14.9 SEC (ref 9–11.1)
RBC # BLD AUTO: 4.14 M/UL (ref 3.8–5.2)
SERVICE CMNT-IMP: ABNORMAL
WBC # BLD AUTO: 9.4 K/UL (ref 3.6–11)

## 2019-09-19 PROCEDURE — 77010033678 HC OXYGEN DAILY

## 2019-09-19 PROCEDURE — 97530 THERAPEUTIC ACTIVITIES: CPT

## 2019-09-19 PROCEDURE — 74011250636 HC RX REV CODE- 250/636: Performed by: INTERNAL MEDICINE

## 2019-09-19 PROCEDURE — 82962 GLUCOSE BLOOD TEST: CPT

## 2019-09-19 PROCEDURE — 65270000029 HC RM PRIVATE

## 2019-09-19 PROCEDURE — 97530 THERAPEUTIC ACTIVITIES: CPT | Performed by: OCCUPATIONAL THERAPIST

## 2019-09-19 PROCEDURE — 74011250637 HC RX REV CODE- 250/637: Performed by: ORTHOPAEDIC SURGERY

## 2019-09-19 PROCEDURE — 74011250637 HC RX REV CODE- 250/637: Performed by: INTERNAL MEDICINE

## 2019-09-19 PROCEDURE — 77030038269 HC DRN EXT URIN PURWCK BARD -A

## 2019-09-19 PROCEDURE — 74011636637 HC RX REV CODE- 636/637: Performed by: INTERNAL MEDICINE

## 2019-09-19 PROCEDURE — 74011250637 HC RX REV CODE- 250/637: Performed by: NURSE PRACTITIONER

## 2019-09-19 PROCEDURE — 85027 COMPLETE CBC AUTOMATED: CPT

## 2019-09-19 PROCEDURE — 36415 COLL VENOUS BLD VENIPUNCTURE: CPT

## 2019-09-19 PROCEDURE — 97535 SELF CARE MNGMENT TRAINING: CPT | Performed by: OCCUPATIONAL THERAPIST

## 2019-09-19 PROCEDURE — 85610 PROTHROMBIN TIME: CPT

## 2019-09-19 PROCEDURE — 94760 N-INVAS EAR/PLS OXIMETRY 1: CPT

## 2019-09-19 RX ORDER — INSULIN LISPRO 100 [IU]/ML
5 INJECTION, SOLUTION INTRAVENOUS; SUBCUTANEOUS ONCE
Status: COMPLETED | OUTPATIENT
Start: 2019-09-19 | End: 2019-09-19

## 2019-09-19 RX ORDER — WARFARIN SODIUM 5 MG/1
5 TABLET ORAL ONCE
Status: DISCONTINUED | OUTPATIENT
Start: 2019-09-19 | End: 2019-09-19 | Stop reason: DRUGHIGH

## 2019-09-19 RX ORDER — INSULIN GLARGINE 100 [IU]/ML
10 INJECTION, SOLUTION SUBCUTANEOUS
Status: DISCONTINUED | OUTPATIENT
Start: 2019-09-19 | End: 2019-09-20 | Stop reason: HOSPADM

## 2019-09-19 RX ORDER — HYDRALAZINE HYDROCHLORIDE 20 MG/ML
10 INJECTION INTRAMUSCULAR; INTRAVENOUS
Status: DISCONTINUED | OUTPATIENT
Start: 2019-09-19 | End: 2019-09-20 | Stop reason: HOSPADM

## 2019-09-19 RX ADMIN — BENZTROPINE MESYLATE 1 MG: 1 TABLET ORAL at 09:09

## 2019-09-19 RX ADMIN — ALLOPURINOL 100 MG: 100 TABLET ORAL at 09:09

## 2019-09-19 RX ADMIN — CALCIUM CARBONATE-VITAMIN D TAB 500 MG-200 UNIT 1 TABLET: 500-200 TAB at 09:08

## 2019-09-19 RX ADMIN — INSULIN LISPRO 7 UNITS: 100 INJECTION, SOLUTION INTRAVENOUS; SUBCUTANEOUS at 09:14

## 2019-09-19 RX ADMIN — HEPARIN SODIUM 5000 UNITS: 5000 INJECTION INTRAVENOUS; SUBCUTANEOUS at 14:00

## 2019-09-19 RX ADMIN — POTASSIUM CHLORIDE 20 MEQ: 1.5 POWDER, FOR SOLUTION ORAL at 09:12

## 2019-09-19 RX ADMIN — INSULIN LISPRO 5 UNITS: 100 INJECTION, SOLUTION INTRAVENOUS; SUBCUTANEOUS at 12:50

## 2019-09-19 RX ADMIN — SENNOSIDES, DOCUSATE SODIUM 1 TABLET: 50; 8.6 TABLET, FILM COATED ORAL at 17:59

## 2019-09-19 RX ADMIN — CARVEDILOL 3.12 MG: 3.12 TABLET, FILM COATED ORAL at 17:59

## 2019-09-19 RX ADMIN — FLUPHENAZINE HYDROCHLORIDE 1 MG: 1 TABLET, FILM COATED ORAL at 09:12

## 2019-09-19 RX ADMIN — ACETAMINOPHEN 650 MG: 325 TABLET ORAL at 05:51

## 2019-09-19 RX ADMIN — CARVEDILOL 3.12 MG: 3.12 TABLET, FILM COATED ORAL at 09:08

## 2019-09-19 RX ADMIN — Medication 10 ML: at 21:23

## 2019-09-19 RX ADMIN — TRAMADOL HYDROCHLORIDE 50 MG: 50 TABLET, COATED ORAL at 22:32

## 2019-09-19 RX ADMIN — MEMANTINE HYDROCHLORIDE 10 MG: 10 TABLET ORAL at 09:08

## 2019-09-19 RX ADMIN — ACETAMINOPHEN 650 MG: 325 TABLET ORAL at 18:00

## 2019-09-19 RX ADMIN — Medication 10 ML: at 05:51

## 2019-09-19 RX ADMIN — PRAVASTATIN SODIUM 20 MG: 10 TABLET ORAL at 22:32

## 2019-09-19 RX ADMIN — Medication 10 ML: at 18:06

## 2019-09-19 RX ADMIN — ACETAMINOPHEN 650 MG: 325 TABLET ORAL at 12:49

## 2019-09-19 RX ADMIN — MEMANTINE HYDROCHLORIDE 10 MG: 10 TABLET ORAL at 22:32

## 2019-09-19 RX ADMIN — BENZTROPINE MESYLATE 1 MG: 1 TABLET ORAL at 22:32

## 2019-09-19 RX ADMIN — FERROUS SULFATE TAB 325 MG (65 MG ELEMENTAL FE) 325 MG: 325 (65 FE) TAB at 09:13

## 2019-09-19 RX ADMIN — ACETAMINOPHEN 650 MG: 325 TABLET ORAL at 00:28

## 2019-09-19 RX ADMIN — FERROUS SULFATE TAB 325 MG (65 MG ELEMENTAL FE) 325 MG: 325 (65 FE) TAB at 17:59

## 2019-09-19 RX ADMIN — HEPARIN SODIUM 5000 UNITS: 5000 INJECTION INTRAVENOUS; SUBCUTANEOUS at 05:51

## 2019-09-19 RX ADMIN — HEPARIN SODIUM 5000 UNITS: 5000 INJECTION INTRAVENOUS; SUBCUTANEOUS at 22:32

## 2019-09-19 RX ADMIN — POLYETHYLENE GLYCOL 3350 17 G: 17 POWDER, FOR SOLUTION ORAL at 09:06

## 2019-09-19 RX ADMIN — AMLODIPINE BESYLATE 10 MG: 5 TABLET ORAL at 09:10

## 2019-09-19 RX ADMIN — INSULIN LISPRO 10 UNITS: 100 INJECTION, SOLUTION INTRAVENOUS; SUBCUTANEOUS at 12:49

## 2019-09-19 RX ADMIN — CALCIUM CARBONATE-VITAMIN D TAB 500 MG-200 UNIT 1 TABLET: 500-200 TAB at 12:50

## 2019-09-19 RX ADMIN — FLUPHENAZINE HYDROCHLORIDE 2 MG: 1 TABLET, FILM COATED ORAL at 22:32

## 2019-09-19 RX ADMIN — SENNOSIDES, DOCUSATE SODIUM 1 TABLET: 50; 8.6 TABLET, FILM COATED ORAL at 09:12

## 2019-09-19 RX ADMIN — TORSEMIDE 100 MG: 20 TABLET ORAL at 09:11

## 2019-09-19 RX ADMIN — INSULIN GLARGINE 10 UNITS: 100 INJECTION, SOLUTION SUBCUTANEOUS at 22:32

## 2019-09-19 RX ADMIN — FAMOTIDINE 20 MG: 20 TABLET ORAL at 18:00

## 2019-09-19 RX ADMIN — INSULIN LISPRO 7 UNITS: 100 INJECTION, SOLUTION INTRAVENOUS; SUBCUTANEOUS at 18:04

## 2019-09-19 RX ADMIN — TRAMADOL HYDROCHLORIDE 50 MG: 50 TABLET, COATED ORAL at 09:10

## 2019-09-19 RX ADMIN — WARFARIN SODIUM 7 MG: 5 TABLET ORAL at 17:59

## 2019-09-19 RX ADMIN — INSULIN LISPRO 4 UNITS: 100 INJECTION, SOLUTION INTRAVENOUS; SUBCUTANEOUS at 22:32

## 2019-09-19 NOTE — PROGRESS NOTES
Orthopedic End of Shift Note Bedside and Verbal shift change report given to Amanda Pennington (oncoming nurse) by Rose Zaidi RN (offgoing nurse). Report included the following information SBAR, Kardex, OR Summary, Procedure Summary, Intake/Output, MAR and Recent Results. POD# 1 Significant issues during shift: N/A Issues for Physician to address: Activity This Shift 
(check all that apply) [] chair 
[] dangle 
 [] bathroom 
[] bedside commode [] hallway [x] bedrest  
Nausea/Vomiting [] yes [x] no    
Voiding Status [x] void [] Borrego [] I&O Cath Bowel Movements [x] yes [] no Foot Pumps or SCD [] yes [x] no Ice Pack [x] yes    [] no Incentive Spirometer [] yes [x] no Volume:    
Telemetry Monitoring   [x] yes [] no Rhythm:  
Supplemental O2 [] yes [x] no Sat off O2:

## 2019-09-19 NOTE — PROGRESS NOTES
Problem: Patient Education: Go to Patient Education Activity Goal: Patient/Family Education Outcome: Progressing Towards Goal 
  
Problem: Hip Fracture:Day of Admission Pre-op Goal: Off Pathway (Use only if patient is Off Pathway) Outcome: Progressing Towards Goal 
Goal: Activity/Safety Outcome: Progressing Towards Goal 
Goal: Consults, if ordered Outcome: Progressing Towards Goal 
Goal: Diagnostic Test/Procedures Outcome: Progressing Towards Goal 
Goal: Nutrition/Diet Outcome: Progressing Towards Goal 
Goal: Medications Outcome: Progressing Towards Goal 
Goal: Respiratory Outcome: Progressing Towards Goal 
Goal: Treatments/Interventions/Procedures Outcome: Progressing Towards Goal 
Goal: Psychosocial 
Outcome: Progressing Towards Goal 
Goal: *Labs/Tests Within Defined Limits in Preparation for Surgery Outcome: Progressing Towards Goal 
Goal: *Optimal pain control at patient's stated goal 
Outcome: Progressing Towards Goal 
Goal: *Hemodynamically stable Outcome: Progressing Towards Goal 
  
Problem: Diabetes Self-Management Goal: *Disease process and treatment process Description Define diabetes and identify own type of diabetes; list 3 options for treating diabetes. Outcome: Progressing Towards Goal 
Goal: *Incorporating nutritional management into lifestyle Description Describe effect of type, amount and timing of food on blood glucose; list 3 methods for planning meals. Outcome: Progressing Towards Goal 
Goal: *Incorporating physical activity into lifestyle Description State effect of exercise on blood glucose levels. Outcome: Progressing Towards Goal 
Goal: *Developing strategies to promote health/change behavior Description Define the ABC's of diabetes; identify appropriate screenings, schedule and personal plan for screenings. Outcome: Progressing Towards Goal 
Goal: *Using medications safely Description State effect of diabetes medications on diabetes; name diabetes medication taking, action and side effects. Outcome: Progressing Towards Goal 
Goal: *Monitoring blood glucose, interpreting and using results Description Identify recommended blood glucose targets  and personal targets. Outcome: Progressing Towards Goal 
Goal: *Prevention, detection, treatment of acute complications Description List symptoms of hyper- and hypoglycemia; describe how to treat low blood sugar and actions for lowering  high blood glucose level. Outcome: Progressing Towards Goal 
Goal: *Prevention, detection and treatment of chronic complications Description Define the natural course of diabetes and describe the relationship of blood glucose levels to long term complications of diabetes. Outcome: Progressing Towards Goal 
Goal: *Developing strategies to address psychosocial issues Description Describe feelings about living with diabetes; identify support needed and support network Outcome: Progressing Towards Goal 
Goal: *Insulin pump training Outcome: Progressing Towards Goal 
Goal: *Sick day guidelines Outcome: Progressing Towards Goal 
Goal: *Patient Specific Goal (EDIT GOAL, INSERT TEXT) Outcome: Progressing Towards Goal 
  
Problem: Patient Education: Go to Patient Education Activity Goal: Patient/Family Education Outcome: Progressing Towards Goal 
  
Problem: Delirium Treatment Goal: *Level of consciousness restored to baseline Outcome: Progressing Towards Goal 
Goal: *Level of environmental perceptions restored to baseline Outcome: Progressing Towards Goal 
Goal: *Sensory perception restored to baseline Outcome: Progressing Towards Goal 
Goal: *Emotional stability restored to baseline Outcome: Progressing Towards Goal 
Goal: *Functional assessment restored to baseline Outcome: Progressing Towards Goal 
Goal: *Absence of falls Outcome: Progressing Towards Goal 
Goal: *Will remain free of delirium, CAM Score negative Outcome: Progressing Towards Goal 
 Goal: *Cognitive status will be restored to baseline Outcome: Progressing Towards Goal 
Goal: Interventions Outcome: Progressing Towards Goal 
  
Problem: Patient Education: Go to Patient Education Activity Goal: Patient/Family Education Outcome: Progressing Towards Goal 
  
Problem: Falls - Risk of 
Goal: *Absence of Falls Description Document Monet Late Fall Risk and appropriate interventions in the flowsheet. Outcome: Progressing Towards Goal 
Note:  
Fall Risk Interventions: 
  
 
Mentation Interventions: Update white board, Reorient patient, More frequent rounding, Evaluate medications/consider consulting pharmacy, Door open when patient unattended, Bed/chair exit alarm Medication Interventions: Bed/chair exit alarm, Evaluate medications/consider consulting pharmacy Elimination Interventions: Bed/chair exit alarm, Patient to call for help with toileting needs, Toileting schedule/hourly rounds History of Falls Interventions: Bed/chair exit alarm Problem: Patient Education: Go to Patient Education Activity Goal: Patient/Family Education Outcome: Progressing Towards Goal 
  
Problem: Pressure Injury - Risk of 
Goal: *Prevention of pressure injury Description Document Arnulfo Scale and appropriate interventions in the flowsheet. Outcome: Progressing Towards Goal 
Note:  
Pressure Injury Interventions: 
Sensory Interventions: Check visual cues for pain, Keep linens dry and wrinkle-free, Maintain/enhance activity level, Minimize linen layers Moisture Interventions: Absorbent underpads, Offer toileting Q_hr, Maintain skin hydration (lotion/cream), Contain wound drainage Activity Interventions: Assess need for specialty bed Mobility Interventions: PT/OT evaluation, HOB 30 degrees or less, Assess need for specialty bed, Turn and reposition approx. every two hours(pillow and wedges) Nutrition Interventions: Discuss nutritional consult with provider Friction and Shear Interventions: Lift sheet, Lift team/patient mobility team 
 
  
 
 
 
  
Problem: Patient Education: Go to Patient Education Activity Goal: Patient/Family Education Outcome: Progressing Towards Goal 
  
Problem: Patient Education: Go to Patient Education Activity Goal: Patient/Family Education Outcome: Progressing Towards Goal 
  
Problem: Patient Education: Go to Patient Education Activity Goal: Patient/Family Education Outcome: Progressing Towards Goal

## 2019-09-19 NOTE — PROGRESS NOTES
Ortho/ NeuroSurgery NP Note POD# 2  s/p RIGHT FEMUR OPEN REDUCTION INTERNAL FIXATION Pt resting in bed,  Eating breakfast, with family at bedside. States that pain has improved since surgery. VSS Afebrile. Patient has had something to eat/drink. No nausea. Most Recent Labs:  
Lab Results Component Value Date/Time HGB 9.9 (L) 09/19/2019 01:03 AM  
 Hemoglobin A1c, External 7.4 01/14/2016 06:40 AM  
 
 
Body mass index is 30.6 kg/m². Reference: BMI greater than 30 is classified as obesity and greater than 40 is classified as morbid obesity. Voiding status: voiding Dressing c.d.i, cryotherapy in place Calves soft and supple; No pain with passive stretch Sensation and motor intact SCDs for mechanical DVT proph Plan: 
1) PT BID  
2) Rachel-op Antibiotics Ancef 3) Coumadin restarted to be dosed by pharmacy  for DVT Prophylaxis- INR 1.5 on morning labs. 4) Pepcid for GI Prophylaxis 5) Increased creatine of 2.40 from 1.91 on 09/18- will defer to primary team for management 6) Discharge plans pending progression with therapy and medical readiness per primary team, will need SNF Readiness for discharge: 
   [x] Vital Signs stable  
 [x] Hgb stable  
 [x] + Voiding-  
 [x] Incision intact, drainage minimal  
 [x] Tolerating PO intake   
 [] Cleared by PT (OT if applicable) [] Stair training completed (if applicable) [] Independent/Contact Guard ambulation with assistive device (household distance) [] Bed mobility [] Car transfers  
  [] ADLs [x] Adequate pain control on oral medication alone Gary Crowder NP

## 2019-09-19 NOTE — PROGRESS NOTES
Bedside and Verbal shift change report given to Shan Estes RN (oncoming nurse) by Asad Izaguirre RN (offgoing nurse). Report included the following information SBAR, Kardex, Intake/Output, MAR, Recent Results and Med Rec Status.

## 2019-09-19 NOTE — PROGRESS NOTES
Hospitalist Progress Note NAME: Gavin Grayson :  1927 MRN:  [id number] Assessment / Plan: 
 
Right intertrochanteric hip fracture · S/p RIGHT FEMUR OPEN REDUCTION INTERNAL FIXATION  
· Continue pain management and  DVT prophylaxis · PT/OT History of chronic systolic and diastolic heart failure · Not in exacerbation · echo REVEALED EF 56-60%/mild AR/mild TR/mild ascending aorta dilatation History of A. fib · Rate controlled · Warfarin was held for surgery · INR 1.5 today, on warfarin · INR daily Hx of Sick sinus syndrome · Asymptomatic now · Cardiology following MIRANDA on CKD stage III · Likely prerenal  
· IVF for now 100 cc/hr · F/u Cr daily Chronic kidney disease stage III · Follow creatinine daily History of diabetes mellitus type 2 
· Sliding scale and home Lantus. When will have time for surgery we will stop all short acting insulin and continue long-acting at 50% of her home dose History of dementia · Continue home meds 25.0 - 29.9 Overweight / Body mass index is 30.6 kg/m². Code status: DNR Prophylaxis: SCD's Recommended Disposition: TBD Subjective:  
 
Patient was seen and examined this morning. Family was at bedside. All questions were answered She states she is not in pain. Review of Systems: 
Symptom Y/N Comments  Symptom Y/N Comments Fever/Chills n   Chest Pain n   
Poor Appetite n   Edema n   
Cough n   Abdominal Pain n   
Sputum n   Joint Pain n   
SOB/BUTTS n   Pruritis/Rash n   
Nausea/vomit n   Tolerating PT/OT Diarrhea n   Tolerating Diet Constipation n   Other Could NOT obtain due to:   
 
Objective: VITALS:  
Last 24hrs VS reviewed since prior progress note. Most recent are: 
Patient Vitals for the past 24 hrs: 
 Temp Pulse Resp BP SpO2  
19 0900     93 % 19 0733 98.3 °F (36.8 °C) 63  169/66 93 % 19 0343 98.5 °F (36.9 °C) 62 18 153/59 94 % 09/19/19 0029 98.4 °F (36.9 °C) 62 18 133/53 95 % 09/18/19 2018 99 °F (37.2 °C) 73 18 128/65 94 % 09/18/19 1603 99.1 °F (37.3 °C) 80 18 121/62 92 % 09/18/19 1137 99.7 °F (37.6 °C) 83 16 128/59 93 % Intake/Output Summary (Last 24 hours) at 9/19/2019 1115 Last data filed at 9/19/2019 5203 Gross per 24 hour Intake  Output 550 ml Net -550 ml PHYSICAL EXAM: 
General: WD, WN. Alert, cooperative, no acute distress   
EENT:  EOMI. Anicteric sclerae. MMM Resp:  CTA bilaterally, no wheezing or rales. No accessory muscle use CV:  Regular  rhythm,  No edema GI:  Soft, Non distended, Non tender.  +Bowel sounds Neurologic:  Alert and oriented X 3, normal speech, Psych:   Good insight. Not anxious nor agitated Skin:  No rashes. No jaundice Reviewed most current lab test results and cultures  YES Reviewed most current radiology test results   YES Review and summation of old records today    NO Reviewed patient's current orders and MAR    YES 
PMH/ reviewed - no change compared to H&P 
________________________________________________________________________ Care Plan discussed with: 
  Comments Patient x Family  x   
RN x Care Manager x Consultant  x ortho  
                 x Multidiciplinary team rounds were held today with , nursing, pharmacist and clinical coordinator. Patient's plan of care was discussed; medications were reviewed and discharge planning was addressed. ________________________________________________________________________ Total NON critical care TIME:  39   Minutes Total CRITICAL CARE TIME Spent:   Minutes non procedure based Comments >50% of visit spent in counseling and coordination of care    
________________________________________________________________________ Haritha Mcclelland MD  
 
Procedures: see electronic medical records for all procedures/Xrays and details which were not copied into this note but were reviewed prior to creation of Plan. LABS: 
I reviewed today's most current labs and imaging studies. Pertinent labs include: 
Recent Labs  
  09/19/19 0103 09/18/19 0323 09/17/19 0312 09/16/19 
1304 WBC 9.4  --  8.9 8.9 HGB 9.9* 10.2* 10.9* 11.7 HCT 31.9*  --  35.6 37.6   --  172 173 Recent Labs  
  09/19/19 0103 09/18/19 0323 09/17/19 
1741 09/17/19 0312 09/16/19 
1345 NA  --  140  --  138  --  136 K  --  3.9  --  3.7  --  3.8 CL  --  103  --  101  --  99  
CO2  --  28  --  32  --  29  
GLU  --  229*  --  254*  --  350* BUN  --  56*  --  44*  --  34* CREA  --  2.40*  --  1.91*  --  1.63* CA  --  8.0*  --  8.6  --  8.7 ALB  --   --   --   --   --  2.5* TBILI  --   --   --   --   --  0.5 SGOT  --   --   --   --   --  15 ALT  --   --   --   --   --  16 INR 1.5* 1.2* 1.2*  --    < >  --   
 < > = values in this interval not displayed.   
 
 
Signed: America Blackman MD

## 2019-09-19 NOTE — PROGRESS NOTES
Pharmacy Daily Dosing of Warfarin Indication: Atrial Fibrillation; postop Orthopedic Surgery Goal INR: 2-3; as of 2/6/19: Per VC: INR goal = 1.3-1.5 due to age, fall risk and comorbidities PTA Warfarin Dose: Patient takes 5 mg M-Th-F-Su evenings, and 7.5 mg Tu-W-Sa evenings Concurrent anticoagulants: Heparin 5000 Units SC q8h Concurrent antiplatelet: none Major Interacting Medications Drugs that may increase INR: Allopurinol (home med) Drugs that may decrease INR:  
 
Conditions that may increase/decrease INR (CHF, C. diff, cirrhosis, thyroid disorder, hypoalbuminemia): - Hypoalbuminemia: 2.5 (9/16/19) Labs: 
 
Recent Labs  
  09/19/19 
0103 09/18/19 
0323 09/17/19 
1741 09/17/19 
0312 09/16/19 
1556 09/16/19 
1345 09/16/19 
1304 INR 1.5* 1.2* 1.2*  --  1.2*  --   --   
HGB 9.9* 10.2*  --  10.9*  --   --  11.7   --   --  172  --   --  173 TBILI  --   --   --   --   --  0.5  --   
ALB  --   --   --   --   --  2.5*  -- Impression/Plan: INR 1.5 after Warfarin 5mg/5mg Warfarin 5mg today (home dose) Reviewed media scans. Found one from the [facility] stating her goal INR was 1.3-1.5 due to age, fall risk, and comorbidities. If pt was on the regimen as stated on the PTA med list, I would expect her initial INR to be higher. Discussed in IDR - will continue home regimen Daily INR 
CBC w/o diff QOD Pharmacy will follow daily and adjust the dose as appropriate. Thanks Bryan [facility] 
 
 
[email or web address]/[place]/[facility]/Pharmacy/Clinical%20Companion/Warfarin%[email or web address]. pdf

## 2019-09-19 NOTE — PROGRESS NOTES
Problem: Mobility Impaired (Adult and Pediatric) Goal: *Acute Goals and Plan of Care (Insert Text) Description FUNCTIONAL STATUS PRIOR TO ADMISSION: Patient was modified independent using a Single point cane for functional mobility. HOME SUPPORT PRIOR TO ADMISSION: The patient lived in assisted living and required assistance for bathing and dressing. Physical Therapy Goals Initiated 9/18/2019 1. Patient will move from supine to sit and sit to supine  in bed with moderate assistance  within 7 day(s). 2.  Patient will transfer from bed to chair and chair to bed with maximal assistance using the least restrictive device within 7 day(s). 3.  Patient will perform sit to stand with moderate assistance  within 7 day(s). 4.  Patient will ambulate with maximal assistance for 10 feet with the least restrictive device within 7 day(s). Outcome: Not Met PHYSICAL THERAPY TREATMENT Patient: Asif Stephens (12 y.o. female) Date: 9/19/2019 Diagnosis: Closed right hip fracture ([address] [S72.001A] Closed right hip fracture ([address] Procedure(s) (LRB): 
RIGHT FEMUR OPEN REDUCTION INTERNAL FIXATION (Right) 2 Days Post-Op Precautions: TTWB(right) Chart, physical therapy assessment, plan of care and goals were reviewed. ASSESSMENT Based on the objective data described below, patient requires max to total assist for all mobility. Patient with good sitting balance on edge of bed but very rigid through spine, darleen cervical.   Attempted coming to stand x two trials, patient unable to come to full standing position with RW and max assist x 2 and does not maintain toe touch WBing on right. Performed gentle ther ex in sitting and supine. Patient returned to supine in bed and gently positioned right LE into neutral position.   
 
Current Level of Function Impacting Discharge (mobility/balance): max to total assist 
 
Other factors to consider for discharge: total assist 
    
 
PLAN : 
 Patient continues to benefit from skilled intervention to address the above impairments. Continue treatment per established plan of care. to address goals. Recommendation for discharge: (in order for the patient to meet his/her long term goals) Therapy up to 5 days/week in SNF setting This discharge recommendation: 
Has been made in collaboration with the attending provider and/or case management Equipment recommendations for successful discharge (if) home: to be determined by rehab facility SUBJECTIVE:  
Patient stated yes OBJECTIVE DATA SUMMARY:  
Critical Behavior: 
Neurologic State: Alert, Confused Orientation Level: Oriented to person, Disoriented to situation, Disoriented to place, Disoriented to person Cognition: Decreased attention/concentration, Decreased command following, Memory loss Safety/Judgement: Fall prevention, Lack of insight into deficits Functional Mobility Training: 
Bed Mobility: 
Rolling: Total assistance; Additional time;Assist x2 Supine to Sit: Total assistance; Additional time;Assist x2 Sit to Supine: Total assistance;Assist x2; Additional time Transfers: 
Sit to Stand: Maximum assistance;Assist x2(with pulling from RW/ assist place right hand on walker/WB ) Stand to Sit: Maximum assistance Bed to Chair: (unable) Balance: 
Sitting: Intact Standing: Impaired Standing - Static: Constant support;Poor Standing - Dynamic : (unable) Ambulation/Gait Training: 
  
  
  
  
  
  
Right Side Weight Bearing: Toe touch Stairs: Therapeutic Exercises: Ankle pumps, LAQ with assist 
 
 
Activity Tolerance:  
Fair Please refer to the flowsheet for vital signs taken during this treatment.  
 
After treatment patient left in no apparent distress:  
Supine in bed, Call bell within reach, Bed / chair alarm activated and Side rails x 3 
 
COMMUNICATION/COLLABORATION:  
 The patients plan of care was discussed with: Registered Nurse Krystin Maddox, PT Time Calculation: 26 mins

## 2019-09-19 NOTE — PROGRESS NOTES
9/19/2019 11:28 AM 
 
Admit Date: 9/16/2019 Admit Diagnosis:  
Closed right hip fracture ([address] Ming Ash Subjective:  
 
Ame Rust denies chest pain or shortness of breath. No significant kate or tachy. Current Facility-Administered Medications Medication Dose Route Frequency  insulin glargine (LANTUS) injection 10 Units  10 Units SubCUTAneous QHS  hydrALAZINE (APRESOLINE) 20 mg/mL injection 10 mg  10 mg IntraVENous Q6H PRN  
 insulin lispro (HUMALOG) injection   SubCUTAneous AC&HS  sodium chloride (NS) flush 5-40 mL  5-40 mL IntraVENous Q8H  
 sodium chloride (NS) flush 5-40 mL  5-40 mL IntraVENous PRN  
 naloxone (NARCAN) injection 0.4 mg  0.4 mg IntraVENous PRN  
 calcium-vitamin D (OS-MAI) 500 mg-200 unit tablet  1 Tab Oral TID WITH MEALS  senna-docusate (PERICOLACE) 8.6-50 mg per tablet 1 Tab  1 Tab Oral BID  polyethylene glycol (MIRALAX) packet 17 g  17 g Oral DAILY  bisacodyl (DULCOLAX) suppository 10 mg  10 mg Rectal DAILY PRN  
 acetaminophen (TYLENOL) tablet 650 mg  650 mg Oral Q6H  
 traMADol (ULTRAM) tablet 50 mg  50 mg Oral Q6H PRN  
 oxyCODONE IR (ROXICODONE) tablet 5 mg  5 mg Oral Q4H PRN  
 famotidine (PEPCID) tablet 20 mg  20 mg Oral Q24H  
 WARFARIN INFORMATION NOTE (COUMADIN)   Other QPM  
 sodium chloride (NS) flush 5-40 mL  5-40 mL IntraVENous Q8H  
 sodium chloride (NS) flush 5-40 mL  5-40 mL IntraVENous PRN  
 acetaminophen (TYLENOL) tablet 650 mg  650 mg Oral Q4H PRN  
 HYDROmorphone (PF) (DILAUDID) injection 0.5 mg  0.5 mg IntraVENous Q4H PRN  
 naloxone (NARCAN) injection 0.4 mg  0.4 mg IntraVENous PRN  
 ondansetron (ZOFRAN) injection 4 mg  4 mg IntraVENous Q4H PRN  
 bisacodyl (DULCOLAX) tablet 5 mg  5 mg Oral DAILY PRN  
 heparin (porcine) injection 5,000 Units  5,000 Units SubCUTAneous Q8H  
 allopurinol (ZYLOPRIM) tablet 100 mg  100 mg Oral DAILY  amLODIPine (NORVASC) tablet 10 mg  10 mg Oral DAILY  benztropine (COGENTIN) tablet 1 mg  1 mg Oral BID  carvedilol (COREG) tablet 3.125 mg  3.125 mg Oral BID WITH MEALS  ferrous sulfate tablet 325 mg  325 mg Oral BID WITH MEALS  fluPHENAZine (PROLIXIN) tablet 1 mg  1 mg Oral DAILY  fluPHENAZine (PROLIXIN) tablet 2 mg  2 mg Oral QHS  memantine (NAMENDA) tablet 10 mg  10 mg Oral Q12H  potassium chloride (KLOR-CON) packet for solution 20 mEq  20 mEq Oral DAILY  pravastatin (PRAVACHOL) tablet 20 mg  20 mg Oral QHS  torsemide (DEMADEX) tablet 100 mg  100 mg Oral DAILY  glucose chewable tablet 16 g  4 Tab Oral PRN  
 glucagon (GLUCAGEN) injection 1 mg  1 mg IntraMUSCular PRN  
 dextrose 10 % infusion 125-250 mL  125-250 mL IntraVENous PRN Objective:  
  
Physical Exam:   
Visit Vitals /83 (BP 1 Location: Left arm, BP Patient Position: At rest) Pulse 69 Temp 98.6 °F (37 °C) Resp 18 Wt 196 lb 3.4 oz (89 kg) SpO2 95% BMI 31.67 kg/m² Gen:  NAD Mental Status - Alert. General Appearance - Not in acute distress. Chest and Lung Exam  
Inspection: Accessory muscles - No use of accessory muscles in breathing. Auscultation:  
Breath sounds: - Normal.  
Cardiovascular Inspection: Jugular vein - Bilateral - Inspection Normal.  
Palpation/Percussion:  
Apical Impulse: - Normal.  
Auscultation: Rhythm - Regular with ectopics. Heart Sounds - S1 WNL and S2 WNL. No S3 or S4. Murmurs & Other Heart Sounds: Auscultation of the heart reveals - No Murmurs. Peripheral Vascular Upper Extremity: Inspection - Bilateral - No Cyanotic nailbeds or Digital clubbing. Lower Extremity:  
Palpation: Edema - Bilateral - No edema. Abdomen:   Soft, non-tender, bowel sounds are active. Neuro: A&O times 3, CN and motor grossly WNL Data Review:  
Recent Labs  
  09/19/19 
0103 09/18/19 
2646 09/17/19 
4932 WBC 9.4  --  8.9 HGB 9.9* 10.2* 10.9* HCT 31.9*  --  35.6   --  172 Recent Labs  
  09/19/19 
0103 09/18/19 8744 09/17/19 
1741 09/17/19 
8611 NA  --  140  --  138 K  --  3.9  --  3.7 CL  --  103  --  101 CO2  --  28  --  32  
GLU  --  229*  --  254* BUN  --  56*  --  44* CREA  --  2.40*  --  1.91* CA  --  8.0*  --  8.6 INR 1.5* 1.2* 1.2*  -- No results for input(s): TROIQ, CPK, CKMB in the last 72 hours. Intake/Output Summary (Last 24 hours) at 9/19/2019 1908 Last data filed at 9/19/2019 6459 Gross per 24 hour Intake  Output 225 ml Net -225 ml Telemetry: normal sinus rhythm, pac's. some junctional beats rare short runs of atrial ectopy Assessment:  
 
Principal Problem: 
  Closed right hip fracture ([facility] (9/16/2019) Active Problems: Hyperlipidemia () Paroxysmal atrial fibrillation (HCC) () Alzheimer's disease () 
 
  SSS (sick sinus syndrome) ([facility] (9/17/2019) Tachy-kate syndrome ([facility] (9/18/2019) Plan:  
 
Asif Stephens denies chest pain or shortness of breath. Hip fracture s/p fall. Tolerated surgery. Rhythm stable. 1 AVB, some junctional beats, and some brief atrial ectopy (very low burden, <30 beats in 24 hours). · ECHO with EF 56-60% and no significant structural heart disease · No emergent indication for pacer per EP- outpatient event monitor, f/u with Dr. Agustin Clark thereafter. Thanks for the consult. I will sign off for now. Please call if any questions or concerns.   Added f/u instructions to DC instructions.

## 2019-09-19 NOTE — PROGRESS NOTES
Problem: Self Care Deficits Care Plan (Adult) Goal: *Acute Goals and Plan of Care (Insert Text) Description FUNCTIONAL STATUS PRIOR TO ADMISSION: Family reports pt can walk to dining hernandez with [facility] or , has assistance for bathing and dressing. Can feed and groom herself HOME SUPPORT: Pt is LTC resident at [facility], has supportive family. Occupational Therapy Goals Initiated 9/18/2019 1. Patient will perform grooming with supervision/set-up sitting EOB within 7 day(s). 2.  Patient will perform toilet transfers with maximal assistance to [facility] maintaining TTWB within 7 day(s). 3.  Patient will perform all aspects of toileting with maximal assistance within 7 day(s). 4.  Patient will participate in upper extremity therapeutic exercise/activities with minimal assistance for 3 minutes within 7 day(s). Outcome: Progressing Towards Goal 
 OCCUPATIONAL THERAPY TREATMENT Patient: Alycia Mejia (89 y.o. female) Date: 9/19/2019 Diagnosis: Closed right hip fracture ([address] [S72.001A] Closed right hip fracture ([address] Procedure(s) (LRB): 
RIGHT FEMUR OPEN REDUCTION INTERNAL FIXATION (Right) 2 Days Post-Op Precautions: TTWB(right) Chart, occupational therapy assessment, plan of care, and goals were reviewed. ASSESSMENT Based on the objective data described below, confusion, flat affect and needed calming voice at end of session as pt was wimpering at end of sesison. She reported pain but was unable to rate, but reported ice improved her pain. Signficant assist needed for bed mobility and ADLs in general.  Recommend return to LTC at discharge. Pt is unable to turn her head left or right due to stiffness and right is only functional at elbow through hand and PROM with gentle stretch given to right shoulder.    
 
Current Level of Function Impacting Discharge (ADLs): max assist grooming, max assist x2 sit to stand with assist with TTWB RLE and use of Rw for support. Therapist needed to place pts bilateral hands on walker (pt was unable to reach up to it), total assist bed mobility Other factors to consider for discharge: LTC resident requires significant assist at this time PLAN : 
Patient continues to benefit from skilled intervention to address the above impairments. Continue treatment per established plan of care. to address goals. Recommend with staff: assist with feeding, frequent turns Recommend next OT session: ROM UE, UB ADLS, sit to stand attempt Recommendation for discharge: (in order for the patient to meet his/her long term goals) Therapy up to 5 days/week in SNF setting This discharge recommendation: 
Has been made in collaboration with the attending provider and/or case management Equipment recommendations for successful discharge (if) home: to be determined by rehab facility SUBJECTIVE:  
Patient stated Thank you.  OBJECTIVE DATA SUMMARY:  
Cognitive/Behavioral Status: 
Neurologic State: Alert;Confused Orientation Level: Oriented to person;Disoriented to situation;Disoriented to place; Disoriented to person Cognition: Decreased attention/concentration;Decreased command following;Memory loss Perception: Cues to attend to right side of body(RUE is rigid and is limited at shoulder ) Perseveration: No perseveration noted Safety/Judgement: Fall prevention;Lack of insight into deficits Functional Mobility and Transfers for ADLs: 
Bed Mobility: 
Rolling: Total assistance; Additional time;Assist x2 Supine to Sit: Total assistance; Additional time;Assist x2 Sit to Supine: Total assistance;Assist x2; Additional time Transfers: 
Sit to Stand: Maximum assistance;Assist x2(with pulling from RW/ assist place right hand on walker/WB ) Bed to Chair: (unable) Balance: 
Sitting: Intact Standing: Impaired Standing - Static: Constant support;Poor Standing - Dynamic : (unable) ADL Intervention: 
  
 
Grooming Washing Face: Moderate assistance(only able to reach lower face seated EOB) Brushing Teeth: Maximum assistance(with toothette ) Cognitive Retraining Problem Solving: Identifying the problem; Identifying the task Safety/Judgement: Fall prevention;Lack of insight into deficits Therapeutic Exercises: PROM Right shoulder, AAROM all other UE Pain: 
Reports pain with activity but improves with rest.  Unable to rate Activity Tolerance:  
Fair and requires rest breaks Please refer to the flowsheet for vital signs taken during this treatment. After treatment patient left in no apparent distress:  
Supine in bed, Heels elevated for pressure relief, Call bell within reach and Bed / chair alarm activated COMMUNICATION/COLLABORATION:  
The patients plan of care was discussed with: Physical Therapist and Registered Nurse Kenyatta Guillermo OTR/L Time Calculation: 28 mins

## 2019-09-19 NOTE — PROGRESS NOTES
Patient's bg 357. Dr. Ekta Phelps notified and ordered 5 units and an additional 10 units to be administered. S  automatic will recheck manually.

## 2019-09-20 VITALS
OXYGEN SATURATION: 95 % | RESPIRATION RATE: 18 BRPM | DIASTOLIC BLOOD PRESSURE: 52 MMHG | WEIGHT: 196.21 LBS | HEART RATE: 64 BPM | TEMPERATURE: 97.9 F | SYSTOLIC BLOOD PRESSURE: 151 MMHG | BODY MASS INDEX: 31.67 KG/M2

## 2019-09-20 LAB
GLUCOSE BLD STRIP.AUTO-MCNC: 205 MG/DL (ref 65–100)
GLUCOSE BLD STRIP.AUTO-MCNC: 304 MG/DL (ref 65–100)
INR PPP: 1.5 (ref 0.9–1.1)
PROTHROMBIN TIME: 15.1 SEC (ref 9–11.1)
SERVICE CMNT-IMP: ABNORMAL
SERVICE CMNT-IMP: ABNORMAL

## 2019-09-20 PROCEDURE — 74011250636 HC RX REV CODE- 250/636: Performed by: INTERNAL MEDICINE

## 2019-09-20 PROCEDURE — 85610 PROTHROMBIN TIME: CPT

## 2019-09-20 PROCEDURE — 94760 N-INVAS EAR/PLS OXIMETRY 1: CPT

## 2019-09-20 PROCEDURE — 74011250637 HC RX REV CODE- 250/637: Performed by: ORTHOPAEDIC SURGERY

## 2019-09-20 PROCEDURE — 36415 COLL VENOUS BLD VENIPUNCTURE: CPT

## 2019-09-20 PROCEDURE — 74011250637 HC RX REV CODE- 250/637: Performed by: INTERNAL MEDICINE

## 2019-09-20 PROCEDURE — 82962 GLUCOSE BLOOD TEST: CPT

## 2019-09-20 PROCEDURE — 74011636637 HC RX REV CODE- 636/637: Performed by: INTERNAL MEDICINE

## 2019-09-20 RX ORDER — TRAMADOL HYDROCHLORIDE 50 MG/1
50 TABLET ORAL
Qty: 30 TAB | Refills: 0 | Status: SHIPPED | OUTPATIENT
Start: 2019-09-20 | End: 2019-10-04

## 2019-09-20 RX ADMIN — ACETAMINOPHEN 650 MG: 325 TABLET ORAL at 13:01

## 2019-09-20 RX ADMIN — POTASSIUM CHLORIDE 20 MEQ: 1.5 POWDER, FOR SOLUTION ORAL at 10:04

## 2019-09-20 RX ADMIN — BENZTROPINE MESYLATE 1 MG: 1 TABLET ORAL at 10:01

## 2019-09-20 RX ADMIN — TORSEMIDE 100 MG: 20 TABLET ORAL at 10:01

## 2019-09-20 RX ADMIN — POLYETHYLENE GLYCOL 3350 17 G: 17 POWDER, FOR SOLUTION ORAL at 10:00

## 2019-09-20 RX ADMIN — FLUPHENAZINE HYDROCHLORIDE 1 MG: 1 TABLET, FILM COATED ORAL at 10:02

## 2019-09-20 RX ADMIN — TRAMADOL HYDROCHLORIDE 50 MG: 50 TABLET, COATED ORAL at 04:40

## 2019-09-20 RX ADMIN — CALCIUM CARBONATE-VITAMIN D TAB 500 MG-200 UNIT 1 TABLET: 500-200 TAB at 13:03

## 2019-09-20 RX ADMIN — SENNOSIDES, DOCUSATE SODIUM 1 TABLET: 50; 8.6 TABLET, FILM COATED ORAL at 10:02

## 2019-09-20 RX ADMIN — Medication 10 ML: at 07:08

## 2019-09-20 RX ADMIN — INSULIN LISPRO 4 UNITS: 100 INJECTION, SOLUTION INTRAVENOUS; SUBCUTANEOUS at 10:05

## 2019-09-20 RX ADMIN — CALCIUM CARBONATE-VITAMIN D TAB 500 MG-200 UNIT 1 TABLET: 500-200 TAB at 10:02

## 2019-09-20 RX ADMIN — AMLODIPINE BESYLATE 10 MG: 5 TABLET ORAL at 10:01

## 2019-09-20 RX ADMIN — CARVEDILOL 3.12 MG: 3.12 TABLET, FILM COATED ORAL at 10:02

## 2019-09-20 RX ADMIN — FERROUS SULFATE TAB 325 MG (65 MG ELEMENTAL FE) 325 MG: 325 (65 FE) TAB at 10:01

## 2019-09-20 RX ADMIN — INSULIN LISPRO 10 UNITS: 100 INJECTION, SOLUTION INTRAVENOUS; SUBCUTANEOUS at 13:04

## 2019-09-20 RX ADMIN — MEMANTINE HYDROCHLORIDE 10 MG: 10 TABLET ORAL at 10:02

## 2019-09-20 RX ADMIN — ALLOPURINOL 100 MG: 100 TABLET ORAL at 10:02

## 2019-09-20 RX ADMIN — HEPARIN SODIUM 5000 UNITS: 5000 INJECTION INTRAVENOUS; SUBCUTANEOUS at 05:41

## 2019-09-20 RX ADMIN — TRAMADOL HYDROCHLORIDE 50 MG: 50 TABLET, COATED ORAL at 13:02

## 2019-09-20 NOTE — DISCHARGE SUMMARY
Hospitalist Discharge Summary Patient ID: 
Edna Morales 
[id number] 
89 y.o. 
11/7/1927 9/16/2019 PCP on record: Forrest Harper MD 
 
Admit date: 9/16/2019 Discharge date and time: 9/20/2019 DISCHARGE DIAGNOSIS: 
 
Right intertrochanteric hip fracture · S/p RIGHT FEMUR OPEN REDUCTION INTERNAL FIXATION  
 
History of A. Fib Hx of Sick sinus syndrome MIRANDA on CKD stage III Chronic kidney disease stage III History of diabetes mellitus type 2 CONSULTATIONS: 
IP CONSULT TO ORTHOPEDIC SURGERY 
IP CONSULT TO CARDIOLOGY 
 
 
 
______________________________________________________________________ DISCHARGE SUMMARY/HOSPITAL COURSE: The patient is a 80years old woman with past medical history of A. aixa on warfarin, sick sinus syndrome, chronic kidney disease, diabetes mellitus type 2 presented to the hospital after fall was found to have right intertrochanteric hip fracture, orthopedic was consulted and she underwent right femur open reduction with internal fixation. During her hospital stay cardiology and EP was consulted for evaluation of her pacemaker and since his was that there is no indication at this time. September 20, 2019 patient was stable to be discharged to [facility]. 
 
 
 
 
 
 
_______________________________________________________________________ Patient seen and examined by me on discharge day. Pertinent Findings: 
Gen:    Not in distress Chest: Clear lungs CVS:   Regular rhythm. No edema Abd:  Soft, not distended, not tender Neuro:  Alert 
_______________________________________________________________________ DISCHARGE MEDICATIONS:  
Current Discharge Medication List  
  
START taking these medications Details  
traMADol (ULTRAM) 50 mg tablet Take 1 Tab by mouth every six (6) hours as needed for Pain for up to 14 days. Max Daily Amount: 200 mg. Qty: 30 Tab, Refills: 0 Associated Diagnoses: [facility] fracture of right [facility] CONTINUE these medications which have NOT CHANGED Details  
!! fluPHENAZine (PROLIXIN) 1 mg tablet Take 1 mg by mouth daily. Patient takes 1 mg QAM and 2 mg QHS  
  
!! fluPHENAZine (PROLIXIN) 1 mg tablet Take 2 mg by mouth nightly. Patient takes 1 mg QAM and 2 mg QHS  
  
insulin glargine (LANTUS U-100 INSULIN) 100 unit/mL injection 12 Units by SubCUTAneous route nightly. pantoprazole (PROTONIX) 40 mg tablet Take 40 mg by mouth daily. torsemide (DEMADEX) 100 mg tablet Take 100 mg by mouth daily. acetaminophen (TYLENOL) 325 mg tablet Take 650 mg by mouth every six (6) hours as needed for Pain. allopurinol (ZYLOPRIM) 100 mg tablet Take 100 mg by mouth daily. amLODIPine (NORVASC) 10 mg tablet Take 10 mg by mouth daily. !! warfarin (COUMADIN) 5 mg tablet Take 5 mg by mouth four (4) days a week. Patient takes 5 mg M-Th-F-Su evenings, and 7.5 mg Tu-W-Sa evenings  
  
carvedilol (COREG) 3.125 mg tablet Take 3.125 mg by mouth two (2) times daily (with meals). !! warfarin (COUMADIN) 7.5 mg tablet Take 7.5 mg by mouth three (3) days a week. Patient takes 5 mg M-Th-F-Blake evenings, and 7.5 mg Tu-W-Sa evenings  
  
ferrous sulfate (IRON) 325 mg (65 mg iron) EC tablet Take 325 mg by mouth Before breakfast and dinner. benztropine (COGENTIN) 1 mg tablet Take 1 mg by mouth two (2) times a day. Associated Diagnoses: Short of breath on exertion; Lower extremity edema SITagliptin (JANUVIA) 50 mg tablet Take 50 mg by mouth daily. Associated Diagnoses: Short of breath on exertion; Lower extremity edema  
  
memantine ER (NAMENDA XR) 28 mg capsule Take 28 mg by mouth daily. Associated Diagnoses: Short of breath on exertion; Lower extremity edema  
  
potassium chloride (KAON 10%) 20 mEq/15 mL solution Take 20 mEq by mouth daily. Associated Diagnoses: Short of breath on exertion; Lower extremity edema  
  
pravastatin (PRAVACHOL) 20 mg tablet Take 20 mg by mouth nightly. Associated Diagnoses: Short of breath on exertion; Lower extremity edema  
  
 !! - Potential duplicate medications found. Please discuss with provider. Patient Follow Up Instructions: Activity: Activity as tolerated Diet: Cardiac Diet Wound Care: None needed Follow-up Information Follow up With Specialties Details Why Contact Vince Weeks DO Orthopedic Surgery Schedule an appointment as soon as possible for a visit in 2 weeks  [address] [facility] [address] [facility] 
[phone] [address] Cardiology Schedule an appointment as soon as possible for a visit schedule event monitor followed by f/u with Dr. Dayanara Montilla [address] [facility] 
[phone] Shona Mcnulty MD Cardiology, [address] [facility] Vascular Surgery, Internal Medicine  after event monitor [address] 
[phone] [address] [facility]   [address] [place] [address] 
[phone] Rae Real MD Internal Medicine   [address] 
[phone] 
  
  
 
________________________________________________________________ Risk of deterioration: Moderate Condition at Discharge:  Stable 
__________________________________________________________________ Disposition SNF/LTC 
 
____________________________________________________________________ Code Status: DNR/DNI 
___________________________________________________________________ Total time in minutes spent coordinating this discharge (includes going over instructions, follow-up, prescriptions, and preparing report for sign off to her PCP) :  40 minutes Signed: 
Bereket Workman MD

## 2019-09-20 NOTE — PROGRESS NOTES
Problem: Patient Education: Go to Patient Education Activity Goal: Patient/Family Education Outcome: Progressing Towards Goal 
  
Problem: Hip Fracture:Day of Admission Pre-op Goal: Off Pathway (Use only if patient is Off Pathway) Outcome: Progressing Towards Goal 
Goal: Activity/Safety Outcome: Progressing Towards Goal 
Goal: Consults, if ordered Outcome: Progressing Towards Goal 
Goal: Diagnostic Test/Procedures Outcome: Progressing Towards Goal 
Goal: Nutrition/Diet Outcome: Progressing Towards Goal 
Goal: Medications Outcome: Progressing Towards Goal 
Goal: Respiratory Outcome: Progressing Towards Goal 
Goal: Treatments/Interventions/Procedures Outcome: Progressing Towards Goal 
Goal: Psychosocial 
Outcome: Progressing Towards Goal 
Goal: *Labs/Tests Within Defined Limits in Preparation for Surgery Outcome: Progressing Towards Goal 
Goal: *Optimal pain control at patient's stated goal 
Outcome: Progressing Towards Goal 
Goal: *Hemodynamically stable Outcome: Progressing Towards Goal 
  
Problem: Diabetes Self-Management Goal: *Disease process and treatment process Description Define diabetes and identify own type of diabetes; list 3 options for treating diabetes. Outcome: Progressing Towards Goal 
Goal: *Incorporating nutritional management into lifestyle Description Describe effect of type, amount and timing of food on blood glucose; list 3 methods for planning meals. Outcome: Progressing Towards Goal 
Goal: *Incorporating physical activity into lifestyle Description State effect of exercise on blood glucose levels. Outcome: Progressing Towards Goal 
Goal: *Developing strategies to promote health/change behavior Description Define the ABC's of diabetes; identify appropriate screenings, schedule and personal plan for screenings. Outcome: Progressing Towards Goal 
Goal: *Using medications safely Description State effect of diabetes medications on diabetes; name diabetes medication taking, action and side effects. Outcome: Progressing Towards Goal 
Goal: *Monitoring blood glucose, interpreting and using results Description Identify recommended blood glucose targets  and personal targets. Outcome: Progressing Towards Goal 
Goal: *Prevention, detection, treatment of acute complications Description List symptoms of hyper- and hypoglycemia; describe how to treat low blood sugar and actions for lowering  high blood glucose level. Outcome: Progressing Towards Goal 
Goal: *Prevention, detection and treatment of chronic complications Description Define the natural course of diabetes and describe the relationship of blood glucose levels to long term complications of diabetes. Outcome: Progressing Towards Goal 
Goal: *Developing strategies to address psychosocial issues Description Describe feelings about living with diabetes; identify support needed and support network Outcome: Progressing Towards Goal 
Goal: *Insulin pump training Outcome: Progressing Towards Goal 
Goal: *Sick day guidelines Outcome: Progressing Towards Goal 
Goal: *Patient Specific Goal (EDIT GOAL, INSERT TEXT) Outcome: Progressing Towards Goal 
  
Problem: Patient Education: Go to Patient Education Activity Goal: Patient/Family Education Outcome: Progressing Towards Goal 
  
Problem: Delirium Treatment Goal: *Level of consciousness restored to baseline Outcome: Progressing Towards Goal 
Goal: *Level of environmental perceptions restored to baseline Outcome: Progressing Towards Goal 
Goal: *Sensory perception restored to baseline Outcome: Progressing Towards Goal 
Goal: *Emotional stability restored to baseline Outcome: Progressing Towards Goal 
Goal: *Functional assessment restored to baseline Outcome: Progressing Towards Goal 
Goal: *Absence of falls Outcome: Progressing Towards Goal 
Goal: *Will remain free of delirium, CAM Score negative Outcome: Progressing Towards Goal 
 Goal: *Cognitive status will be restored to baseline Outcome: Progressing Towards Goal 
Goal: Interventions Outcome: Progressing Towards Goal 
  
Problem: Patient Education: Go to Patient Education Activity Goal: Patient/Family Education Outcome: Progressing Towards Goal 
  
Problem: Falls - Risk of 
Goal: *Absence of Falls Description Document Margie Girt Fall Risk and appropriate interventions in the flowsheet. Outcome: Progressing Towards Goal 
Note:  
Fall Risk Interventions: 
  
 
Mentation Interventions: Reorient patient, More frequent rounding, Update white board Medication Interventions: Evaluate medications/consider consulting pharmacy Elimination Interventions: Bed/chair exit alarm, Call light in reach, Patient to call for help with toileting needs History of Falls Interventions: Bed/chair exit alarm Problem: Patient Education: Go to Patient Education Activity Goal: Patient/Family Education Outcome: Progressing Towards Goal 
  
Problem: Pressure Injury - Risk of 
Goal: *Prevention of pressure injury Description Document Arnulfo Scale and appropriate interventions in the flowsheet. Outcome: Progressing Towards Goal 
Note:  
Pressure Injury Interventions: 
Sensory Interventions: Float heels, Keep linens dry and wrinkle-free, Maintain/enhance activity level, Minimize linen layers Moisture Interventions: Internal/External urinary devices, Minimize layers, Absorbent underpads, Apply protective barrier, creams and emollients, Check for incontinence Q2 hours and as needed Activity Interventions: PT/OT evaluation, Pressure redistribution bed/mattress(bed type), Assess need for specialty bed Mobility Interventions: Turn and reposition approx. every two hours(pillow and wedges), PT/OT evaluation, Pressure redistribution bed/mattress (bed type), Float heels Nutrition Interventions: Document food/fluid/supplement intake, Discuss nutritional consult with provider Friction and Shear Interventions: Transferring/repositioning devices, Lift team/patient mobility team, Lift sheet, Foam dressings/transparent film/skin sealants, Minimize layers Problem: Patient Education: Go to Patient Education Activity Goal: Patient/Family Education Outcome: Progressing Towards Goal 
  
Problem: Patient Education: Go to Patient Education Activity Goal: Patient/Family Education Outcome: Progressing Towards Goal 
  
Problem: Patient Education: Go to Patient Education Activity Goal: Patient/Family Education Outcome: Progressing Towards Goal

## 2019-09-20 NOTE — PROGRESS NOTES
Orthopedic End of Shift Note Bedside and Verbal shift change report given to Amanda Pennington (oncoming nurse) by Saumya Nieves RN (offgoing nurse). Report included the following information SBAR, Kardex, Procedure Summary, Intake/Output, MAR and Recent Results. POD# 3 Significant issues during shift: N/A Issues for Physician to address: N/A Activity This Shift 
(check all that apply) [] chair 
[] dangle 
 [] bathroom 
[] bedside commode [] hallway [x] bedrest  
Nausea/Vomiting [] yes [x] no    
Voiding Status [x] void [] Borrego [] I&O Cath Bowel Movements [x] yes [] no Foot Pumps or SCD [x] yes [] no Ice Pack [x] yes    [] no Incentive Spirometer [] yes [x] no Volume:    
Telemetry Monitoring   [x] yes [] no Rhythm:  
Supplemental O2 [] yes [x] no Sat off O2:   99%

## 2019-09-20 NOTE — PROGRESS NOTES
CM verified patient has a qualifying hospital stay for [facility]. Initial Inpatient order written on 9/16/2019 at 1705. Patient eligible for transfer to next level of care on or after 9/19/2019 if medically stable. Sound Identification Sheet and Initial Inpatient order to be FAX'd to [employer]. Porsche Hawk RN, BSN, ACM [address] [facility]   [phone]

## 2019-09-20 NOTE — ASSESSMENT & PLAN NOTE
{Optional HCC Summary:51105} Lab Results Component Value Date/Time  WBC 9.4 09/19/2019 01:03 AM  
 HGB 9.9 09/19/2019 01:03 AM  
 HCT 31.9 09/19/2019 01:03 AM  
 PLATELET 644 11/96/2588 01:03 AM  
 Creatinine 2.40 09/18/2019 03:23 AM  
 BUN 56 09/18/2019 03:23 AM  
 Potassium 3.9 09/18/2019 03:23 AM  
 INR 1.5 09/20/2019 04:38 AM  
 Prothrombin time 15.1 09/20/2019 04:38 AM

## 2019-09-20 NOTE — PROGRESS NOTES
Transition of Care Plan to SNF/Rehab 
 
SNF/Rehab Transition: 
Patient has been accepted to [facility] and meets criteria for admission. Patient will transported by [address] and expected to leave at 1:30 PM. Communication to Patient/Family: Met with patient and spoke with pt son Melanie Li, and they are agreeable to the transition plan. Communication to SNF/Rehab: 
Bedside RN, Paty Molina, has been notified to update the transition plan to the facility and call report (phone number [phone]) Discharge information has been updated on the AVS. Discharge instructions to be fax'd to facility at [facility] # [phone]). SNF/Rehab Transition: 
Patient to follow-up with Home Health: NA Long Term patient at [facility] PCP/Specialist: Dr. García Haque Ambulatory Care Management: NA- Long term patient at [facility] Reviewed and confirmed with facility, [facility] they can manage the patient care needs for the following:  
 
Jordon Alamo with (X) only those applicable: 
 
Medication: 
[x]  Medications will be available at the facility []  IV Antibiotics [x]  Controlled Substance - hard copy to be sent with patient  
[]  Weekly Labs Documents: 
[] Hard RX 
[] MAR 
[] Kardex [] AVS 
[]Transfer Summary []Discharge Equipment: 
[]  CPAP/BiPAP []  Wound Vacuum 
[]  Borrego or Urinary Device 
[]  PICC/Central Line 
[]  Nebulizer 
[]  Ventilator Treatment: 
[]Isolation (for MRSA, VRE, etc.) []Surgical Drain Management []Tracheostomy Care 
[]Dressing Changes []Dialysis with transportation and chair time []PEG Care []Oxygen 
[x]Daily Weights for Heart Failure Dietary: 
[x]Any diet limitations []Tube Feedings []Total Parenteral Management (TPN) Eligible for Medicaid Long Term Services and Supports Yes: 
[x] Eligible for medical assistance or will become eligible within 180 days and UAI completed. [] Provider/Patient and/or support system has requested screening. [] UAI copy provided to patient or responsible party 
[] UAI unavailable at discharge will send once processed to SNF provider. [] UAI unavailable at discharged mailed to patient No:  
[] Private pay and is not financially eligible for Medicaid within the next 180 days. [] Reside out-of-state. [] A residents of a state owned/operated facility that is licensed 
by [facility] and [facility] or [facility] 
[] Enrollment in [facility] hospice services 
[] [address] [place] [address] [facility] 
[] Patient /Family declines to have screening completed or provide financial information for screening Financial Resources: 
Medicaid   
[] Initiated and application pending  
[x] Full coverage Advanced Care Plan: 
[]Surrogate Decision Maker of Care 
[x]POA- Pt son Bello Peterson [phone] []Communicated Code Status- DNR Other Pt is a LTC resident at [facility] and will be returning to the SNF portion then transferring back to the LTC side. [facility] already has a UAI completed on file for pt. Tandy Cushing, MSW, [facility] [phone]

## 2019-09-20 NOTE — PROGRESS NOTES
Ortho / Neurosurgery NP Note POD#  3 s/p RIGHT FEMUR OPEN REDUCTION INTERNAL FIXATION Pt seen with son at bedside Pt resting in bed. Responds to verbal stimuli. No complaints from patient though son concerned about patient's current pain. Patient received 4 mg oxycodone at 1237 PM.     
 
VSS Afebrile. Voiding status: voiding spontaneously Output (mL) Urine Voided: 500 ml (09/20/19 0542) Last Bowel Movement Date: 09/19/19 (09/20/19 8694) Unmeasurable Output Stool Occurrence(s): 1 (09/20/19 0009) Labs Lab Results Component Value Date/Time HGB 9.9 (L) 09/19/2019 01:03 AM  
  
Lab Results Component Value Date/Time INR 1.5 (H) 09/20/2019 04:38 AM  
  
 
Recent Glucose Results:  
Lab Results Component Value Date/Time GLUCPOC 304 (H) 09/20/2019 11:35 AM  
 GLUCPOC 205 (H) 09/20/2019 07:41 AM  
 GLUCPOC 264 (H) 09/19/2019 09:48 PM  
 
 
 
Body mass index is 31.67 kg/m². : A BMI > 30 is classified as obesity and > 40 is classified as morbid obesity. Alert and oriented to self only (baseline for patient). R hip opsite dressing c.d.i Calves soft and supple; No pain with passive stretch Sensation and motor intact SCDs for mechanical DVT proph while in bed PLAN: 
1) PT BID WBAT 2) Coumadin restarted (has received 2 doses). INR today is 1.5. Pharmacy to dose 3) GI Prophylaxis - Pepcid 4) Increased creatine of 2.40 from 1.91 on 09/18- will defer to primary team for management. AM labs not rechecked this morning. 5) Readniess for discharge: 
   [x] Vital Signs stable  
 [x] Hgb stable  
 [x] + Voiding  
 [x] Wound intact, drainage minimal  
 [x] Tolerating PO intake   
 [] Cleared by PT (OT if applicable) --- to be placed at [facility]) [] Stair training completed (if applicable) [] Independent / Contact Guard Assist (household distance) [] Bed mobility [] Car transfers  
  [] ADLs  
 [] Adequate pain control on oral medication alone Plan to discharge today at 1:30 PM to [address] [facility] from ortho perspective. Jorge Montoya BSN RN [facility] 
MSN/AGACNP Student

## 2019-09-20 NOTE — DISCHARGE INSTRUCTIONS
Jesse    Discharge Instruction Sheet: Hip Fracture Repair    Dr. Jeff Morgan     Blood Clot Prevention     Resume home Coumadin     Pain control:  Medications   Typically, we will prescribe a narcotic usually 1-2 tabs every three to four hours as needed for your pain. Most patients need this only for the first few weeks.  You should discontinue this as the pain decreases.  Some of these medications contain a large dose of Tylenol (acetaminophen). Therefore, you should consult your pharmacist before taking any additional Tylenol at the same time. Ice Therapy   You will be discharged home with ice/gel packs.  Continue using these regularly to minimize pain and swelling, especially after physical activity. Constipation   Pain medication and anesthesia can be constipating-this can be prevented by gentle physical activity and drinking plenty of fluid.  It should be treated with over-the-counter medications such as Dulcolax, Miralax, Magnesium Citrate and/or Fleets enema.  You should have a bowel movement at least every other day following surgery. Incision care   You will be discharged with a transparent and waterproof dressing over your incision. o This should remain in place for 5-7 days after discharge.   You will be given one additional dressing to use at home in 5-7 days if you are still having drainage   You may shower with this dressing in place after you are discharged. o After showering pat the dressing/incision dry.  When the incision is no longer draining, it may be left open to the air.  DO NOT rub the incision.  DO NOT take a tub bath or go swimming until cleared by your doctor.  DO NOT apply lotions, oils, or creams to incision. To increase and promote healing:   Stop Smoking (or at least cut back on smoking).    Eat a well-balanced diet (high in protein and vitamin C)   If your appetite is poor, consider nutritional supplements like Ensure, Glucerna, or Island Instant Breakfast.   If you are diabetic, controlling you blood sugars is very important to prevent infection and promote wound healing. Nutrition:   If you were on a supplement such as Ensure or Glucerna) while in the hospital, please continue using them with each meal for the next 30 days.  Eat a well-balanced diet - High in protein, high in vitamins and minerals, especially vitamin C and zinc.     Home Health:   If you have staples a home health nurse will remove them in about 10 days.  Physical Therapy will come to your home to continue training for walking, transferring and exercises    Physical Activity     You can weight bear as tolerated on your new hip  .  Bed-rest may slow your recovery.  Use your walker and take short walks about every 2 hours.  Increase your distance each day.  NO DRIVING until told to do so. Warning signs: Please call your physician immediately at [phone] if you have   Bleeding from incision that is constant.  Change in mental status (unusual behavior or confusion)   If your incision develops redness or swelling   Change in wound drainage (increase in amount, color, or foul odor)   Temperature over 101.5 degrees Fahrenheit    Pain in the calf of your leg   Tenderness or redness in the calf of your leg   Increased swelling of the thigh, ankle, calf, or foot. Emergency: CALL 911 if you have   Shortness of breath   Chest pain   Localized chest pain when coughing or taking a deep breath    Follow-up    Please call your surgeons office at [phone] for a follow up appointment.   o You should call as soon as you get home or the next day after discharge. o Ask to make an appointment in 2 weeks.  You can return to work when cleared by a physician.     During normal business hours you may reach Dr. Kaitlynn Hunter  team directly at [phone] if you have concerns or questions.

## 2019-09-20 NOTE — PROGRESS NOTES
Plan of Care 1) SNF to LTC 2) Pt will need 2ND IM letter at time of discharge 3) Pt will need [facility] transportation at time of discharge 10:51 AM- Pt has been accepted to return to [facility] for SNF-LTC when medically stable. 12:15 PM- CM spoke with attending who has medically cleared pt to discharge today. CM arranged transportation with [facility] for 1:30 PM. Pt, pt son Marcos Cooney and RN aware. Medicare pt has received, reviewed, and signed 2nd IM letter informing them of their right to appeal the discharge. Signed copy has been placed on pt bedside chart (verbal consent from pt son Charmain Sicard as pt is not always oriented). JAMES note to follow. ДМИТРИЙ Odell, CM [facility] [phone]

## 2019-09-20 NOTE — DIABETES MGMT
[facility] Progress Note Recommendations/ Comments: pt eating 80 -100 % of meals per documentation. Noted Lantus 10 units resumed last night. If appropriate, please consider: 
1) starting Tradjenta 5 mg daily (formulary replacement for Januvia without renal dosing adjustments and glucose mediated) to address increasing BG throughout the day. Current hospital DM medication: Lantus 10 units, adding Lispro correctional insulin - normal sensitivity. Chart reviewed on Karin Isidro due to hyperglycemia with fasting labs >220 mg/dl. Patient is a 80 y.o. female with known Type 2 Diabetes on Lantus 12 units and Januvia 50 mg daily am and [place] and [address] A1c:  
Lab Results Component Value Date/Time Hemoglobin A1c, External 7.4 01/14/2016 06:40 AM  
 Hemoglobin A1c, External 7.3 04/24/2015 11:36 AM  
 
 
Recent Glucose Results:  
Lab Results Component Value Date/Time GLUCPOC 304 (H) 09/20/2019 11:35 AM  
 GLUCPOC 205 (H) 09/20/2019 07:41 AM  
 GLUCPOC 264 (H) 09/19/2019 09:48 PM  
  
 
Lab Results Component Value Date/Time Creatinine 2.40 (H) 09/18/2019 03:23 AM  
 
CrCl cannot be calculated (Unknown ideal weight. ). Active Orders Diet DIET DIABETIC CONSISTENT CARB Regular PO intake:  
Patient Vitals for the past 72 hrs: 
 % Diet Eaten  
09/20/19 1252 80 % 09/19/19 1011 100 % Will continue to follow as needed. Thank you Tiffanie Wade RD CDE [facility] Time spent: 4 minutes

## 2019-09-20 NOTE — PROGRESS NOTES
Hospital to [facility] SBAR Handoff - Uvaldo Figueroa 
                                                                      80 y.o.   female [address]   Room: 3236/[facility]  Unit Phone# :  [phone] [address] 
MRM 3 ORTHOPEDICS 
[address] [place] [address] Dept: [phone] Loc: [id number] SITUATION Admitted:  9/16/2019         Attending Provider:  Chapis Huynh MD    
 
Consultations:  IP CONSULT TO ORTHOPEDIC SURGERY 
IP CONSULT TO CARDIOLOGY 
 
PCP:  Dong Miller, [address] [place] [address]   [phone] Treatment Team: Attending Provider: Chapis Huynh MD; Consulting Provider: Jolie Hauser DO; Consulting Provider: Nena Garcia; Utilization Review: Oneil Bee RN; Nurse Practitioner: Nahun Alexandra NP; Care Manager: Kate Prakash Admitting Dx:  Closed right hip fracture ([facility] [address] Js Dianabruce Principal Problem: Closed right hip fracture ([facility] [address] 3 Days Post-Op of  
Procedure(s): RIGHT FEMUR OPEN REDUCTION INTERNAL FIXATION  
BY: Jolie Hauser DO             ON: 9/17/2019 Code Status: DNR Advance Directives:  
Advance Care Planning 9/16/2019 Patient's Healthcare Decision Maker is: - Confirm Advance Directive Yes, on file (Send w/patient) Not Received Isolation:  There are currently no Active Isolations       MDRO: No current active infections Pain Medications given:  Tramadol 50mg    Last dose: 9/20/2019 at  1302 Special Equipment needed: no  Type of equipment: 
 
  
  BACKGROUND Allergies: Allergies Allergen Reactions  Influenza Virus Vaccine, Specific Hives  Penicillins Unknown (comments)  Sulfacetamide Unknown (comments) Past Medical History:  
Diagnosis Date  Alzheimer's disease  CHF (congestive heart failure) ([facility] [address]  Dementia  Diabetes ([facility] [address] type II  
 Elevated troponin 12/11/2015  Essential hypertension  Fall as cause of accidental injury in residential institution as place of occurrence 12/11/2015  GERD (gastroesophageal reflux disease)  Heart failure ([facility] [address]  Hyperlipidemia  Hyperlipidemia  Hypoglycemia  Ill-defined condition   
 embolism (DVT)  Paroxysmal atrial fibrillation (HCC)  Psychiatric disorder   
 anxiety Past Surgical History:  
Procedure Laterality Date  HX APPENDECTOMY Medications Prior to Admission Medication Sig  
 fluPHENAZine (PROLIXIN) 1 mg tablet Take 1 mg by mouth daily. Patient takes 1 mg QAM and 2 mg QHS  fluPHENAZine (PROLIXIN) 1 mg tablet Take 2 mg by mouth nightly. Patient takes 1 mg QAM and 2 mg QHS  insulin glargine (LANTUS U-100 INSULIN) 100 unit/mL injection 12 Units by SubCUTAneous route nightly.  pantoprazole (PROTONIX) 40 mg tablet Take 40 mg by mouth daily.  torsemide (DEMADEX) 100 mg tablet Take 100 mg by mouth daily.  acetaminophen (TYLENOL) 325 mg tablet Take 650 mg by mouth every six (6) hours as needed for Pain.  allopurinol (ZYLOPRIM) 100 mg tablet Take 100 mg by mouth daily.  amLODIPine (NORVASC) 10 mg tablet Take 10 mg by mouth daily.  warfarin (COUMADIN) 5 mg tablet Take 5 mg by mouth four (4) days a week. Patient takes 5 mg M-Th-F-Blake evenings, and 7.5 mg Tu-W-Sa evenings  carvedilol (COREG) 3.125 mg tablet Take 3.125 mg by mouth two (2) times daily (with meals).  warfarin (COUMADIN) 7.5 mg tablet Take 7.5 mg by mouth three (3) days a week. Patient takes 5 mg M-Th-F-Blake evenings, and 7.5 mg Tu-W-Sa evenings  ferrous sulfate (IRON) 325 mg (65 mg iron) EC tablet Take 325 mg by mouth Before breakfast and dinner.  benztropine (COGENTIN) 1 mg tablet Take 1 mg by mouth two (2) times a day.  SITagliptin (JANUVIA) 50 mg tablet Take 50 mg by mouth daily.  memantine ER (NAMENDA XR) 28 mg capsule Take 28 mg by mouth daily.  potassium chloride (KAON 10%) 20 mEq/15 mL solution Take 20 mEq by mouth daily.  pravastatin (PRAVACHOL) 20 mg tablet Take 20 mg by mouth nightly. Hard scripts included in transfer packet yes Vaccinations: There is no immunization history for the selected administration types on file for this patient. Readmission Risks:    Known Risks: none The Charl[employer] Co[employer]bit[employer] Index tool is an evidenced based tool that has more automatic generated information. The tool looks at many different items such as the age of the patient, how many times they were admitted in the last calendar year, current length of stay in the hospital and their diagnosis. All of these items are pulled automatically from information documented in the chart from various places and will generate a score that predicts whether a patient is at low (less than 13), medium (13-20) or high (21 or greater) risk of being readmitted. ASSESSMENT Temp: 97 °F (36.1 °C) (09/20/19 1133) Pulse (Heart Rate): 69 (09/20/19 1133) Resp Rate: 18 (09/20/19 1133)           BP: 141/61 (09/20/19 1133) O2 Sat (%): 93 % (09/20/19 1133) Weight: 89 kg (196 lb 3.4 oz)    Height: (not recorded) If above not within 1 hour of discharge: 
 
BP:_____  P:____  R:____ T:_____ O2 Sat: ___%  O2: ______ Active Orders Diet DIET DIABETIC CONSISTENT CARB Regular Orientation: disoriented Active Behaviors: None Active Lines/Drains:  (Peg Tube / [place] / CL or S/L?): no 
 
Urinary Status: Voiding, External catheter     Last BM: Last Bowel Movement Date: 09/19/19 Skin Integrity: Incision (comment) Mobility: Very limited Weight Bearing Status: TTWB (Toe Touch Weight Bearing) Lab Results Component Value Date/Time  Glucose 229 (H) 09/18/2019 03:23 AM  
 INR 1.5 (H) 09/20/2019 04:38 AM  
 INR 1.5 (H) 09/19/2019 01:03 AM  
 HGB 9.9 (L) 09/19/2019 01:03 AM  
 HGB 10.2 (L) 09/18/2019 03:23 AM  
 Hemoglobin A1c, External 7.4 01/14/2016 06:40 AM  
  
  RECOMMENDATION See After Visit Summary (AVS) for: · Discharge instructions · After [address] · Special equipment needed (entered pre-discharge by Care Management) · Medication Reconciliation · Follow up Appointment(s) Report given/sent by:  Kacy Beltre RN Verbal report given to: Navid Marquis LPN Estimated discharge time:  9/20/2019 at 1330

## 2019-09-23 ENCOUNTER — TELEPHONE (OUTPATIENT)
Dept: ORTHOPEDIC SURGERY | Age: 84
End: 2019-09-23

## 2019-09-23 NOTE — TELEPHONE ENCOUNTER
Evelyn Damico from [employer] called to clarify the patient's weight bearing status. She can be reached at [phone].

## 2019-09-23 NOTE — TELEPHONE ENCOUNTER
Nickolas Houston is a 80 y.o. female  HIPAA verified by two patient identifiers. Returned call to LE Durand, to confirm orders for patient's weight bearing status. After reading notes Evelyn was informed patient can bear weight as tolerated.

## 2019-09-25 ENCOUNTER — PATIENT OUTREACH (OUTPATIENT)
Dept: CASE MANAGEMENT | Age: 84
End: 2019-09-25

## 2019-09-25 NOTE — PROGRESS NOTES
Community Care Team documentation for patient in [facility] Initial Follow Up Patient was discharged to [address] [place] [address], [facility]. Information included in this progress note has been provided to SNF. Hospital Admission and Diagnosis:  MRM 9/16-9/20 Right intertrochanteric hip fracture, s/p Right Femur Open Reduction Internal Fixation RRAT Score: 32 Advance Care Planning: Not on file PCP : Isela Payne MD 
 
SNF Attending:  Shiloh Parada MD 
 
Spoke with SNF team.   Provided needed hospital follow up appointments:   Abril Rock DO Orthopedic Surgery Schedule an appointment as soon as possible for a visit in 2 weeks; [id number] . Cardiology Schedule an appointment as soon as possible for event monitor, followed by appt with Gregg Peck MD. PT/OT update: Currently mod assist with bed mobility and sit to stand transfers. Max assist with sliding board transfers. Dependent with upper body ADLs, lower body ADLs and toileting. Not ambulating at this time. Pt was ambulating prior to fall resulting in hip fx. LOS/ Disposition:  4 weeks LOS anticipated. Pt is a LTC resident at [facility]. Plan to return to LTC following skilled therapy. Community Care Team will follow up weekly with [facility] until discharge. Medications were not reconciled and general patient assessment was not completed during this [facility] outreach.

## 2019-10-03 DIAGNOSIS — S72.001D CLOSED FRACTURE OF RIGHT HIP WITH ROUTINE HEALING, SUBSEQUENT ENCOUNTER: Primary | ICD-10-CM

## 2019-10-04 ENCOUNTER — HOSPITAL ENCOUNTER (OUTPATIENT)
Dept: GENERAL RADIOLOGY | Age: 84
Discharge: HOME | End: 2019-10-04
Payer: COMMERCIAL

## 2019-10-04 ENCOUNTER — OFFICE VISIT (OUTPATIENT)
Dept: ORTHOPEDIC SURGERY | Age: 84
End: 2019-10-04

## 2019-10-04 VITALS
TEMPERATURE: 98.7 F | HEART RATE: 61 BPM | DIASTOLIC BLOOD PRESSURE: 54 MMHG | SYSTOLIC BLOOD PRESSURE: 128 MMHG | OXYGEN SATURATION: 93 % | RESPIRATION RATE: 14 BRPM

## 2019-10-04 DIAGNOSIS — S72.001D CLOSED FRACTURE OF RIGHT HIP WITH ROUTINE HEALING, SUBSEQUENT ENCOUNTER: Primary | ICD-10-CM

## 2019-10-04 DIAGNOSIS — S72.001D CLOSED FRACTURE OF RIGHT HIP WITH ROUTINE HEALING, SUBSEQUENT ENCOUNTER: ICD-10-CM

## 2019-10-04 PROCEDURE — 73552 X-RAY EXAM OF FEMUR 2/>: CPT

## 2019-10-04 RX ORDER — INSULIN LISPRO 100 [IU]/ML
5 INJECTION, SOLUTION INTRAVENOUS; SUBCUTANEOUS
COMMUNITY

## 2019-10-04 RX ORDER — ALENDRONATE SODIUM 70 MG/1
70 TABLET ORAL
COMMUNITY

## 2019-10-04 NOTE — PROGRESS NOTES
is here for a follow up visit from a right hip nail. The patient is doing well, with no medical complications since discharge. Pain has been appropriate since surgery,and the patient is progressing well with physical therapy. Current Outpatient Medications on File Prior to Visit   Medication Sig Dispense Refill    traMADol (ULTRAM) 50 mg tablet Take 1 Tab by mouth every six (6) hours as needed for Pain for up to 14 days. Max Daily Amount: 200 mg. 30 Tab 0    fluPHENAZine (PROLIXIN) 1 mg tablet Take 1 mg by mouth daily. Patient takes 1 mg QAM and 2 mg QHS      fluPHENAZine (PROLIXIN) 1 mg tablet Take 2 mg by mouth nightly. Patient takes 1 mg QAM and 2 mg QHS      insulin glargine (LANTUS U-100 INSULIN) 100 unit/mL injection 12 Units by SubCUTAneous route nightly.  pantoprazole (PROTONIX) 40 mg tablet Take 40 mg by mouth daily.  torsemide (DEMADEX) 100 mg tablet Take 100 mg by mouth daily.  acetaminophen (TYLENOL) 325 mg tablet Take 650 mg by mouth every six (6) hours as needed for Pain.  allopurinol (ZYLOPRIM) 100 mg tablet Take 100 mg by mouth daily.  amLODIPine (NORVASC) 10 mg tablet Take 10 mg by mouth daily.  warfarin (COUMADIN) 5 mg tablet Take 5 mg by mouth four (4) days a week. Patient takes 5 mg M-Th-F-Blake evenings, and 7.5 mg Tu-W-Sa evenings      carvedilol (COREG) 3.125 mg tablet Take 3.125 mg by mouth two (2) times daily (with meals).  warfarin (COUMADIN) 7.5 mg tablet Take 7.5 mg by mouth three (3) days a week. Patient takes 5 mg M-Th-F-Blake evenings, and 7.5 mg Tu-W-Sa evenings      ferrous sulfate (IRON) 325 mg (65 mg iron) EC tablet Take 325 mg by mouth Before breakfast and dinner.  benztropine (COGENTIN) 1 mg tablet Take 1 mg by mouth two (2) times a day.  SITagliptin (JANUVIA) 50 mg tablet Take 50 mg by mouth daily.  memantine ER (NAMENDA XR) 28 mg capsule Take 28 mg by mouth daily.       potassium chloride (KAON 10%) 20 mEq/15 mL solution Take 20 mEq by mouth daily.  pravastatin (PRAVACHOL) 20 mg tablet Take 20 mg by mouth nightly. No current facility-administered medications on file prior to visit. ROS:  General: denies agitation, major chest pain, unexpected weakness  Patient complains of left  hip pain which is minimal managed with tylenol. Skin: healing wound is without issue or drainage  Strength: appropriate weakness of involved extremity is resolving since surgery      Physical Examination:    Blood pressure 128/54, pulse 61, temperature 98.7 °F (37.1 °C), temperature source Oral, resp. rate 14, SpO2 93 %. Dressing: Clean, Dry, Intact  Skin: no significant drainage, no wound dehiscence  Sensation intact to light touch at level of wound and distally  Strength is 3/5  Range of motion is full but minimally painful  Distal swelling is noted, but appropriate for postoperative course  Distal capillary refill less than 2 seconds      Imaging:    Postoperative xrays are reviewed, they indicate good alignment of fracture and implant, no loosening or failure.       Assessment: Status post CMN right hip    Plan:  Patient will proceed with  physical therapy  Wound care was reviewed  Weightbearing will be full through the hip  Deep Venous Thrombosis Prophylaxis: home anticoagulant  Follow up will be at 3 weeks from now,  With hip xrays on follow up

## 2019-10-04 NOTE — PROGRESS NOTES
Room 3    Identified pt with two pt identifiers(name and ). Reviewed record in preparation for visit and have obtained necessary documentation. All patient medications has been reviewed. Chief Complaint   Patient presents with    Knee Pain       Health Maintenance Due   Topic    FOOT EXAM Q1     EYE EXAM RETINAL OR DILATED     DTaP/Tdap/Td series (1 - Tdap)    Shingrix Vaccine Age 50> (1 of 2)    GLAUCOMA SCREENING Q2Y     Bone Densitometry (Dexa) Screening     Pneumococcal 65+ years (1 of 2 - PCV13)    HEMOGLOBIN A1C Q6M     MICROALBUMIN Q1     LIPID PANEL Q1     MEDICARE YEARLY EXAM     Influenza Age 5 to Adult        Vitals:    10/04/19 1042   BP: 128/54   Pulse: 61   Resp: 14   Temp: 98.7 °F (37.1 °C)   TempSrc: Oral   SpO2: 93%   PainSc:   5   PainLoc: Knee       Coordination of Care Questionnaire:   1) Have you been to an emergency room, urgent care, or hospitalized since your last visit? yes       2. Have seen or consulted any other health care provider since your last visit? YES      Patient is accompanied by son I have received verbal consent from Arvin Arriaza to discuss any/all medical information while they are present in the room.

## 2019-10-16 ENCOUNTER — PATIENT OUTREACH (OUTPATIENT)
Dept: CASE MANAGEMENT | Age: 84
End: 2019-10-16

## 2019-10-16 NOTE — PROGRESS NOTES
Community Care Team Documentation for Patient in [facility] Subsequent Follow up Patient remains at [address] ([facility]). See previous [facility] Team notes. Spoke with SNF team.  Appointments:  Nicholas Patterson DO Orthopedic Surgery Schedule an appointment as soon as possible for a visit in 2 weeks; [address] Cardiology Schedule an appointment as soon as possible. PT/OT update: Currently bed mobility and transfers min assist, ambulating 20 ft [facility] with min assist, UB ADL's min assist, LB ADL's max assist, toileting max assist.  LOS/ Disposition: Plan for discharge in 3 weeks. Medications were not reconciled and general patient assessment was not completed during this skilled nursing facility outreach. Kristal Jeffrey RN, BSN,  [facility] Team 
[phone]

## 2019-10-23 ENCOUNTER — PATIENT OUTREACH (OUTPATIENT)
Dept: CASE MANAGEMENT | Age: 84
End: 2019-10-23

## 2019-10-23 NOTE — PROGRESS NOTES
Community Care Team Documentation for Patient in [facility] Subsequent Follow up Patient remains at [address] ([facility]). See previous [facility] Team notes. Spoke with SNF team.  Appointments:  Reji Velasco DO Orthopedic Surgery Schedule an appointment as soon as possible for a visit in 2 weeks; [address] Cardiology Schedule an appointment as soon as possible. PT/OT update: Currently min assist with bed mobility and transfers. Ambulating 50ft with RW and min assist. Min assist with upper body ADLs. Max assist with lower body ADLs. Dependent with toileting. LOS/ Disposition: Plan for discharge in 2-3 weeks  to LTC at [facility]. Medications were not reconciled and general patient assessment was not completed during this skilled nursing facility outreach.

## 2019-10-24 DIAGNOSIS — S72.001D CLOSED FRACTURE OF RIGHT HIP WITH ROUTINE HEALING, SUBSEQUENT ENCOUNTER: Primary | ICD-10-CM

## 2019-10-25 ENCOUNTER — HOSPITAL ENCOUNTER (OUTPATIENT)
Dept: GENERAL RADIOLOGY | Age: 84
Discharge: HOME | End: 2019-10-25
Payer: MEDICARE

## 2019-10-25 ENCOUNTER — OFFICE VISIT (OUTPATIENT)
Dept: ORTHOPEDIC SURGERY | Age: 84
End: 2019-10-25

## 2019-10-25 VITALS
SYSTOLIC BLOOD PRESSURE: 100 MMHG | RESPIRATION RATE: 14 BRPM | TEMPERATURE: 98.2 F | BODY MASS INDEX: 31.5 KG/M2 | OXYGEN SATURATION: 90 % | HEART RATE: 65 BPM | DIASTOLIC BLOOD PRESSURE: 70 MMHG | HEIGHT: 66 IN | WEIGHT: 196 LBS

## 2019-10-25 DIAGNOSIS — S72.001D CLOSED FRACTURE OF RIGHT HIP WITH ROUTINE HEALING, SUBSEQUENT ENCOUNTER: ICD-10-CM

## 2019-10-25 DIAGNOSIS — S72.001D CLOSED FRACTURE OF RIGHT HIP WITH ROUTINE HEALING, SUBSEQUENT ENCOUNTER: Primary | ICD-10-CM

## 2019-10-25 PROCEDURE — 73552 X-RAY EXAM OF FEMUR 2/>: CPT

## 2019-10-25 NOTE — PROGRESS NOTES
is here for a follow up visit from a CMN right hip. Pain has been appropriate since surgery, no major medical complications since surgery. She is mobilizing well. Current Outpatient Medications on File Prior to Visit   Medication Sig Dispense Refill    insulin lispro (HUMALOG U-100 INSULIN) 100 unit/mL injection 5 Units by SubCUTAneous route.  alendronate (FOSAMAX) 70 mg tablet Take 70 mg by mouth every seven (7) days.  fluPHENAZine (PROLIXIN) 1 mg tablet Take 1 mg by mouth daily. Patient takes 1 mg QAM and 2 mg QHS      fluPHENAZine (PROLIXIN) 1 mg tablet Take 2 mg by mouth nightly. Patient takes 1 mg QAM and 2 mg QHS      insulin glargine (LANTUS U-100 INSULIN) 100 unit/mL injection 22 Units by SubCUTAneous route nightly.  pantoprazole (PROTONIX) 40 mg tablet Take 40 mg by mouth daily.  torsemide (DEMADEX) 100 mg tablet Take 100 mg by mouth daily.  acetaminophen (TYLENOL) 325 mg tablet Take 650 mg by mouth every six (6) hours as needed for Pain.  allopurinol (ZYLOPRIM) 100 mg tablet Take 100 mg by mouth daily.  amLODIPine (NORVASC) 10 mg tablet Take 10 mg by mouth daily.  warfarin (COUMADIN) 5 mg tablet Take 5 mg by mouth four (4) days a week. Patient takes 5 mg M-Th-F-Blake evenings, and 7.5 mg Tu-W-Sa evenings      carvedilol (COREG) 3.125 mg tablet Take 3.125 mg by mouth two (2) times daily (with meals).  warfarin (COUMADIN) 7.5 mg tablet Take 7.5 mg by mouth three (3) days a week. Patient takes 5 mg M-Th-F-Blake evenings, and 7.5 mg Tu-W-Sa evenings      ferrous sulfate (IRON) 325 mg (65 mg iron) EC tablet Take 325 mg by mouth Before breakfast and dinner.  benztropine (COGENTIN) 1 mg tablet Take 1 mg by mouth two (2) times a day.  SITagliptin (JANUVIA) 50 mg tablet Take 50 mg by mouth daily.  memantine ER (NAMENDA XR) 28 mg capsule Take 28 mg by mouth daily.       potassium chloride (KAON 10%) 20 mEq/15 mL solution Take 20 mEq by mouth daily.      pravastatin (PRAVACHOL) 20 mg tablet Take 20 mg by mouth nightly. No current facility-administered medications on file prior to visit. ROS:  General: denies agitation, major chest pain, unexpected weakness  Patient complains of right hip weakness managed with PT. Skin: healing wound is without issue or drainage  Strength: appropriate weakness of involved extremity is resolving since surgery      Physical Examination:    Blood pressure 100/70, pulse 65, temperature 98.2 °F (36.8 °C), temperature source Oral, resp. rate 14, height 5' 6\" (1.676 m), weight 196 lb (88.9 kg), SpO2 90 %.     Dressing: Clean, Dry, Intact  Skin: intact  Sensation intact to light touch at level of wound and distally  Strength is 4/5 hip flexion  Range of motion is full  Distal swelling is noted, but appropriate for postoperative course  Distal capillary refill less than 2 seconds      Imaging:    Postoperative imaging: healing fracture of right hip, hardware intact, early callus formation is noted      Assessment: Status post CMN right hip fracture    Plan:  Patient will continue physical therapy  Wound care was reviewed  Weightbearing will be full through the right leg  Deep Venous Thrombosis Prophylaxis: home therapy    Follow up will be at 4 weeks from now,  Right hip xrays on follow up

## 2019-10-30 ENCOUNTER — PATIENT OUTREACH (OUTPATIENT)
Dept: CASE MANAGEMENT | Age: 84
End: 2019-10-30

## 2019-10-30 NOTE — PROGRESS NOTES
Community Care Team Documentation for Patient in [facility] Subsequent Follow up Patient remains at [address] ([facility]). See previous [facility] Team notes. Spoke with SNF team.  PT/OT update: Currently bed mobility and transfers with CGA, ambulating 25 ft using RW with min assist.  LOS/ Disposition: Plan for discharge on 11/12 to LTC at [facility]. Medications were not reconciled and general patient assessment was not completed during this skilled nursing facility outreach. Kristal Pineda RN, BSN,  [facility] Team 
[phone]

## 2019-11-06 ENCOUNTER — PATIENT OUTREACH (OUTPATIENT)
Dept: CASE MANAGEMENT | Age: 84
End: 2019-11-06

## 2019-11-07 NOTE — PROGRESS NOTES
Community Care Team Documentation for Patient in [facility] Subsequent Follow up Patient remains at [address] ([facility]). See previous [facility] Team notes. Spoke with SNF team.   PT/OT update: Currently bed mobility and transfers with CGA, ambulating 75 ft using RW with CGA to min assist, UB ADL's with supervision, dependent with LB ADL's, toileting with max assist.  LOS/ Disposition: Plan for discharge on 11/12 to LTC at [facility]. Medications were not reconciled and general patient assessment was not completed during this skilled nursing facility outreach. Kristal Stewart RN, BSN,  [facility] Team 
[phone]

## 2019-11-13 ENCOUNTER — PATIENT OUTREACH (OUTPATIENT)
Dept: CASE MANAGEMENT | Age: 84
End: 2019-11-13

## 2019-11-13 NOTE — PROGRESS NOTES
Community Care Team Documentation for Patient in [facility] Subsequent Follow up Patient remains at [address] ([facility]). See previous [facility] Team notes. Spoke with SNF team. PT/OT update: Currently supervision with bed mobility and transfers. Ambulating 55ft with RW and contact guard assist. Min assist with upper body ADLs. Max assist with lower body ADLs and toileting. LOS/ Disposition:  Plan to transition to LTC 11/14 at [facility]. Medications were not reconciled and general patient assessment was not completed during this skilled nursing facility outreach.

## 2019-11-18 DIAGNOSIS — S72.001D CLOSED FRACTURE OF RIGHT HIP WITH ROUTINE HEALING, SUBSEQUENT ENCOUNTER: Primary | ICD-10-CM

## 2019-11-20 ENCOUNTER — PATIENT OUTREACH (OUTPATIENT)
Dept: CASE MANAGEMENT | Age: 84
End: 2019-11-20

## 2019-11-21 NOTE — PROGRESS NOTES
Community Care Team Documentation for Patient in [facility] Discharge Note Patient has been discharged from [address] [place] [address] Rd (formerly [facility]). See previous [facility] Team notes. PCP : Betsy Petersen MD 
 
Spoke with SNF team.  Confirmed the patient transitioned to LTC on 11/14. Community Care Team will sign off at this time. Medications were not reconciled and general patient assessment was not completed during this skilled nursing facility outreach. Kristal Caceres RN, BSN,  [facility] Team 
[phone]

## 2019-11-22 ENCOUNTER — OFFICE VISIT (OUTPATIENT)
Dept: ORTHOPEDIC SURGERY | Age: 84
End: 2019-11-22

## 2019-11-22 ENCOUNTER — HOSPITAL ENCOUNTER (OUTPATIENT)
Dept: GENERAL RADIOLOGY | Age: 84
Discharge: HOME | End: 2019-11-22
Payer: MEDICARE

## 2019-11-22 VITALS
SYSTOLIC BLOOD PRESSURE: 140 MMHG | WEIGHT: 196 LBS | BODY MASS INDEX: 31.5 KG/M2 | TEMPERATURE: 98.2 F | OXYGEN SATURATION: 94 % | HEART RATE: 78 BPM | RESPIRATION RATE: 16 BRPM | HEIGHT: 66 IN | DIASTOLIC BLOOD PRESSURE: 78 MMHG

## 2019-11-22 DIAGNOSIS — S72.001D CLOSED FRACTURE OF RIGHT HIP WITH ROUTINE HEALING, SUBSEQUENT ENCOUNTER: ICD-10-CM

## 2019-11-22 DIAGNOSIS — S72.001D CLOSED FRACTURE OF RIGHT HIP WITH ROUTINE HEALING, SUBSEQUENT ENCOUNTER: Primary | ICD-10-CM

## 2019-11-22 PROCEDURE — 73502 X-RAY EXAM HIP UNI 2-3 VIEWS: CPT

## 2019-11-22 NOTE — PROGRESS NOTES
is here for a follow up visit from a right hip fracture. Pain has been appropriate since surgery, no major medical complications since surgery. Current Outpatient Medications on File Prior to Visit Medication Sig Dispense Refill  insulin lispro (HUMALOG U-100 INSULIN) 100 unit/mL injection 5 Units by SubCUTAneous route.  alendronate (FOSAMAX) 70 mg tablet Take 70 mg by mouth every seven (7) days.  fluPHENAZine (PROLIXIN) 1 mg tablet Take 1 mg by mouth daily. Patient takes 1 mg QAM and 2 mg QHS  fluPHENAZine (PROLIXIN) 1 mg tablet Take 2 mg by mouth nightly. Patient takes 1 mg QAM and 2 mg QHS  insulin glargine (LANTUS U-100 INSULIN) 100 unit/mL injection 22 Units by SubCUTAneous route nightly.  pantoprazole (PROTONIX) 40 mg tablet Take 40 mg by mouth daily.  torsemide (DEMADEX) 100 mg tablet Take 100 mg by mouth daily.  acetaminophen (TYLENOL) 325 mg tablet Take 650 mg by mouth every six (6) hours as needed for Pain.  allopurinol (ZYLOPRIM) 100 mg tablet Take 100 mg by mouth daily.  amLODIPine (NORVASC) 10 mg tablet Take 10 mg by mouth daily.  warfarin (COUMADIN) 5 mg tablet Take 5 mg by mouth four (4) days a week. Patient takes 5 mg M-Th-F-Blake evenings, and 7.5 mg Tu-W-Sa evenings  carvedilol (COREG) 3.125 mg tablet Take 3.125 mg by mouth two (2) times daily (with meals).  warfarin (COUMADIN) 7.5 mg tablet Take 7.5 mg by mouth three (3) days a week. Patient takes 5 mg M-Th-F-Blake evenings, and 7.5 mg Tu-W-Sa evenings  ferrous sulfate (IRON) 325 mg (65 mg iron) EC tablet Take 325 mg by mouth Before breakfast and dinner.  benztropine (COGENTIN) 1 mg tablet Take 1 mg by mouth two (2) times a day.  SITagliptin (JANUVIA) 50 mg tablet Take 50 mg by mouth daily.  memantine ER (NAMENDA XR) 28 mg capsule Take 28 mg by mouth daily.  potassium chloride (KAON 10%) 20 mEq/15 mL solution Take 20 mEq by mouth daily.  pravastatin (PRAVACHOL) 20 mg tablet Take 20 mg by mouth nightly. No current facility-administered medications on file prior to visit. ROS: 
General: denies agitation, major chest pain, unexpected weakness Patient states no issues Skin: healing wound is without issue or drainage Strength: appropriate weakness of involved extremity is resolving since surgery Physical Examination: 
 
Blood pressure 140/78, pulse 78, temperature 98.2 °F (36.8 °C), temperature source Oral, resp. rate 16, height 5' 6\" (1.676 m), weight 196 lb (88.9 kg), SpO2 94 %. Dressing: Clean, Dry, Intact Skin: intact Sensation intact to light touch at level of wound and distally Strength is 4/5 hip flexion Range of motion is full, pain free Distal swelling is noted, but appropriate for postoperative course Distal capillary refill less than 2 seconds Imaging: Postoperative imaging: healed IT fx, stable internal fixation Assessment: Status post CMN right hip, healed Plan: 
Patient will continue physical therapy Wound care was reviewed Weightbearing will be as tolerated through the right hip Deep Venous Thrombosis Prophylaxis: per primary team 
 
Follow up will be prn

## 2020-01-01 ENCOUNTER — APPOINTMENT (OUTPATIENT)
Dept: GENERAL RADIOLOGY | Age: 85
DRG: 070 | End: 2020-01-01
Attending: EMERGENCY MEDICINE
Payer: MEDICARE

## 2020-01-01 ENCOUNTER — APPOINTMENT (OUTPATIENT)
Dept: NON INVASIVE DIAGNOSTICS | Age: 85
DRG: 070 | End: 2020-01-01
Attending: INTERNAL MEDICINE
Payer: MEDICARE

## 2020-01-01 ENCOUNTER — PATIENT OUTREACH (OUTPATIENT)
Dept: CARDIOLOGY CLINIC | Age: 85
End: 2020-01-01

## 2020-01-01 ENCOUNTER — APPOINTMENT (OUTPATIENT)
Dept: GENERAL RADIOLOGY | Age: 85
DRG: 070 | End: 2020-01-01
Attending: INTERNAL MEDICINE
Payer: MEDICARE

## 2020-01-01 ENCOUNTER — HOSPITAL ENCOUNTER (INPATIENT)
Age: 85
LOS: 3 days | Discharge: NURSING, REHAB OR LONG-TERM CARE | DRG: 070 | End: 2020-03-23
Attending: EMERGENCY MEDICINE | Admitting: INTERNAL MEDICINE
Payer: MEDICARE

## 2020-01-01 ENCOUNTER — APPOINTMENT (OUTPATIENT)
Dept: MRI IMAGING | Age: 85
DRG: 070 | End: 2020-01-01
Attending: INTERNAL MEDICINE
Payer: MEDICARE

## 2020-01-01 ENCOUNTER — APPOINTMENT (OUTPATIENT)
Dept: CT IMAGING | Age: 85
DRG: 070 | End: 2020-01-01
Attending: EMERGENCY MEDICINE
Payer: MEDICARE

## 2020-01-01 VITALS
TEMPERATURE: 98.4 F | HEIGHT: 65 IN | WEIGHT: 167.8 LBS | BODY MASS INDEX: 27.96 KG/M2 | SYSTOLIC BLOOD PRESSURE: 164 MMHG | RESPIRATION RATE: 18 BRPM | OXYGEN SATURATION: 97 % | DIASTOLIC BLOOD PRESSURE: 77 MMHG | HEART RATE: 56 BPM

## 2020-01-01 DIAGNOSIS — E11.49 TYPE 2 DIABETES MELLITUS WITH OTHER NEUROLOGIC COMPLICATION, WITH LONG-TERM CURRENT USE OF INSULIN (HCC): ICD-10-CM

## 2020-01-01 DIAGNOSIS — R41.0 CONFUSION: ICD-10-CM

## 2020-01-01 DIAGNOSIS — G30.1 LATE ONSET ALZHEIMER'S DISEASE WITHOUT BEHAVIORAL DISTURBANCE (HCC): ICD-10-CM

## 2020-01-01 DIAGNOSIS — I50.22 CHRONIC SYSTOLIC CONGESTIVE HEART FAILURE (HCC): ICD-10-CM

## 2020-01-01 DIAGNOSIS — M10.441 OTHER SECONDARY ACUTE GOUT OF RIGHT HAND: ICD-10-CM

## 2020-01-01 DIAGNOSIS — Z79.4 TYPE 2 DIABETES MELLITUS WITH OTHER NEUROLOGIC COMPLICATION, WITH LONG-TERM CURRENT USE OF INSULIN (HCC): ICD-10-CM

## 2020-01-01 DIAGNOSIS — I48.0 PAROXYSMAL ATRIAL FIBRILLATION (HCC): ICD-10-CM

## 2020-01-01 DIAGNOSIS — F02.80 LATE ONSET ALZHEIMER'S DISEASE WITHOUT BEHAVIORAL DISTURBANCE (HCC): ICD-10-CM

## 2020-01-01 DIAGNOSIS — G93.40 ACUTE ENCEPHALOPATHY: Primary | ICD-10-CM

## 2020-01-01 LAB
ALBUMIN SERPL-MCNC: 2.8 G/DL (ref 3.5–5)
ALBUMIN/GLOB SERPL: 0.6 {RATIO} (ref 1.1–2.2)
ALP SERPL-CCNC: 121 U/L (ref 45–117)
ALT SERPL-CCNC: 15 U/L (ref 12–78)
AMMONIA PLAS-SCNC: <10 UMOL/L
AMPHET UR QL SCN: NEGATIVE
ANION GAP SERPL CALC-SCNC: 4 MMOL/L (ref 5–15)
ANION GAP SERPL CALC-SCNC: 5 MMOL/L (ref 5–15)
ANION GAP SERPL CALC-SCNC: 6 MMOL/L (ref 5–15)
APPEARANCE UR: ABNORMAL
ARTERIAL PATENCY WRIST A: YES
AST SERPL-CCNC: 7 U/L (ref 15–37)
ATRIAL RATE: 72 BPM
BACTERIA URNS QL MICRO: NEGATIVE /HPF
BARBITURATES UR QL SCN: NEGATIVE
BASE EXCESS BLD CALC-SCNC: 5 MMOL/L
BASOPHILS # BLD: 0 K/UL (ref 0–0.1)
BASOPHILS # BLD: 0 K/UL (ref 0–0.1)
BASOPHILS NFR BLD: 0 % (ref 0–1)
BASOPHILS NFR BLD: 1 % (ref 0–1)
BDY SITE: ABNORMAL
BENZODIAZ UR QL: NEGATIVE
BILIRUB SERPL-MCNC: 0.3 MG/DL (ref 0.2–1)
BILIRUB UR QL: NEGATIVE
BNP SERPL-MCNC: 3817 PG/ML
BUN SERPL-MCNC: 29 MG/DL (ref 6–20)
BUN SERPL-MCNC: 33 MG/DL (ref 6–20)
BUN SERPL-MCNC: 42 MG/DL (ref 6–20)
BUN/CREAT SERPL: 18 (ref 12–20)
BUN/CREAT SERPL: 20 (ref 12–20)
BUN/CREAT SERPL: 20 (ref 12–20)
CA-I BLD-SCNC: 1.24 MMOL/L (ref 1.12–1.32)
CALCIUM SERPL-MCNC: 8.8 MG/DL (ref 8.5–10.1)
CALCIUM SERPL-MCNC: 8.8 MG/DL (ref 8.5–10.1)
CALCIUM SERPL-MCNC: 9.1 MG/DL (ref 8.5–10.1)
CALCULATED R AXIS, ECG10: -15 DEGREES
CALCULATED T AXIS, ECG11: -2 DEGREES
CANNABINOIDS UR QL SCN: NEGATIVE
CHLORIDE SERPL-SCNC: 108 MMOL/L (ref 97–108)
CHLORIDE SERPL-SCNC: 110 MMOL/L (ref 97–108)
CHLORIDE SERPL-SCNC: 110 MMOL/L (ref 97–108)
CHOLEST SERPL-MCNC: 153 MG/DL
CO2 SERPL-SCNC: 29 MMOL/L (ref 21–32)
CO2 SERPL-SCNC: 29 MMOL/L (ref 21–32)
CO2 SERPL-SCNC: 32 MMOL/L (ref 21–32)
COCAINE UR QL SCN: NEGATIVE
COLOR UR: ABNORMAL
CREAT BLD-MCNC: 1.9 MG/DL (ref 0.6–1.3)
CREAT SERPL-MCNC: 1.44 MG/DL (ref 0.55–1.02)
CREAT SERPL-MCNC: 1.88 MG/DL (ref 0.55–1.02)
CREAT SERPL-MCNC: 2.06 MG/DL (ref 0.55–1.02)
DIAGNOSIS, 93000: NORMAL
DIFFERENTIAL METHOD BLD: ABNORMAL
DIFFERENTIAL METHOD BLD: ABNORMAL
DRUG SCRN COMMENT,DRGCM: NORMAL
EOSINOPHIL # BLD: 0.1 K/UL (ref 0–0.4)
EOSINOPHIL # BLD: 0.1 K/UL (ref 0–0.4)
EOSINOPHIL NFR BLD: 2 % (ref 0–7)
EOSINOPHIL NFR BLD: 2 % (ref 0–7)
EPITH CASTS URNS QL MICRO: ABNORMAL /LPF
ERYTHROCYTE [DISTWIDTH] IN BLOOD BY AUTOMATED COUNT: 17.1 % (ref 11.5–14.5)
ERYTHROCYTE [DISTWIDTH] IN BLOOD BY AUTOMATED COUNT: 17.7 % (ref 11.5–14.5)
GAS FLOW.O2 O2 DELIVERY SYS: ABNORMAL L/MIN
GLOBULIN SER CALC-MCNC: 4.7 G/DL (ref 2–4)
GLUCOSE BLD STRIP.AUTO-MCNC: 124 MG/DL (ref 65–100)
GLUCOSE BLD STRIP.AUTO-MCNC: 172 MG/DL (ref 65–100)
GLUCOSE BLD STRIP.AUTO-MCNC: 182 MG/DL (ref 65–100)
GLUCOSE BLD STRIP.AUTO-MCNC: 184 MG/DL (ref 65–100)
GLUCOSE BLD STRIP.AUTO-MCNC: 189 MG/DL (ref 65–100)
GLUCOSE BLD STRIP.AUTO-MCNC: 194 MG/DL (ref 65–100)
GLUCOSE BLD STRIP.AUTO-MCNC: 251 MG/DL (ref 65–100)
GLUCOSE BLD STRIP.AUTO-MCNC: 289 MG/DL (ref 65–100)
GLUCOSE BLD STRIP.AUTO-MCNC: 354 MG/DL (ref 65–100)
GLUCOSE SERPL-MCNC: 131 MG/DL (ref 65–100)
GLUCOSE SERPL-MCNC: 189 MG/DL (ref 65–100)
GLUCOSE SERPL-MCNC: 226 MG/DL (ref 65–100)
GLUCOSE UR STRIP.AUTO-MCNC: 100 MG/DL
HCO3 BLD-SCNC: 29.3 MMOL/L (ref 22–26)
HCT VFR BLD AUTO: 33.6 % (ref 35–47)
HCT VFR BLD AUTO: 34.4 % (ref 35–47)
HDLC SERPL-MCNC: 48 MG/DL
HDLC SERPL: 3.2 {RATIO} (ref 0–5)
HGB BLD-MCNC: 10.2 G/DL (ref 11.5–16)
HGB BLD-MCNC: 10.5 G/DL (ref 11.5–16)
HGB UR QL STRIP: NEGATIVE
HYALINE CASTS URNS QL MICRO: ABNORMAL /LPF (ref 0–5)
IMM GRANULOCYTES # BLD AUTO: 0 K/UL (ref 0–0.04)
IMM GRANULOCYTES # BLD AUTO: 0 K/UL (ref 0–0.04)
IMM GRANULOCYTES NFR BLD AUTO: 0 % (ref 0–0.5)
IMM GRANULOCYTES NFR BLD AUTO: 0 % (ref 0–0.5)
INR PPP: 1.3 (ref 0.9–1.1)
INR PPP: 1.6 (ref 0.9–1.1)
INR PPP: 1.9 (ref 0.9–1.1)
KETONES UR QL STRIP.AUTO: NEGATIVE MG/DL
LDLC SERPL CALC-MCNC: 82.2 MG/DL (ref 0–100)
LEUKOCYTE ESTERASE UR QL STRIP.AUTO: NEGATIVE
LIPID PROFILE,FLP: NORMAL
LYMPHOCYTES # BLD: 1.5 K/UL (ref 0.8–3.5)
LYMPHOCYTES # BLD: 1.7 K/UL (ref 0.8–3.5)
LYMPHOCYTES NFR BLD: 25 % (ref 12–49)
LYMPHOCYTES NFR BLD: 30 % (ref 12–49)
MCH RBC QN AUTO: 22.7 PG (ref 26–34)
MCH RBC QN AUTO: 22.9 PG (ref 26–34)
MCHC RBC AUTO-ENTMCNC: 30.4 G/DL (ref 30–36.5)
MCHC RBC AUTO-ENTMCNC: 30.5 G/DL (ref 30–36.5)
MCV RBC AUTO: 74.3 FL (ref 80–99)
MCV RBC AUTO: 75.3 FL (ref 80–99)
METHADONE UR QL: NEGATIVE
MONOCYTES # BLD: 0.6 K/UL (ref 0–1)
MONOCYTES # BLD: 0.6 K/UL (ref 0–1)
MONOCYTES NFR BLD: 10 % (ref 5–13)
MONOCYTES NFR BLD: 9 % (ref 5–13)
NEUTS SEG # BLD: 3.4 K/UL (ref 1.8–8)
NEUTS SEG # BLD: 3.8 K/UL (ref 1.8–8)
NEUTS SEG NFR BLD: 57 % (ref 32–75)
NEUTS SEG NFR BLD: 64 % (ref 32–75)
NITRITE UR QL STRIP.AUTO: NEGATIVE
NRBC # BLD: 0 K/UL (ref 0–0.01)
NRBC # BLD: 0 K/UL (ref 0–0.01)
NRBC BLD-RTO: 0 PER 100 WBC
NRBC BLD-RTO: 0 PER 100 WBC
OPIATES UR QL: NEGATIVE
PCO2 BLD: 46 MMHG (ref 35–45)
PCP UR QL: NEGATIVE
PH BLD: 7.41 [PH] (ref 7.35–7.45)
PH UR STRIP: 5.5 [PH] (ref 5–8)
PLATELET # BLD AUTO: 191 K/UL (ref 150–400)
PLATELET # BLD AUTO: 215 K/UL (ref 150–400)
PMV BLD AUTO: 10.7 FL (ref 8.9–12.9)
PMV BLD AUTO: 12.2 FL (ref 8.9–12.9)
PO2 BLD: 69 MMHG (ref 80–100)
POTASSIUM SERPL-SCNC: 3 MMOL/L (ref 3.5–5.1)
POTASSIUM SERPL-SCNC: 3.6 MMOL/L (ref 3.5–5.1)
POTASSIUM SERPL-SCNC: 4.1 MMOL/L (ref 3.5–5.1)
PROT SERPL-MCNC: 7.5 G/DL (ref 6.4–8.2)
PROT UR STRIP-MCNC: 100 MG/DL
PROTHROMBIN TIME: 13.5 SEC (ref 9–11.1)
PROTHROMBIN TIME: 16 SEC (ref 9–11.1)
PROTHROMBIN TIME: 19.1 SEC (ref 9–11.1)
Q-T INTERVAL, ECG07: 406 MS
QRS DURATION, ECG06: 70 MS
QTC CALCULATION (BEZET), ECG08: 450 MS
RBC # BLD AUTO: 4.46 M/UL (ref 3.8–5.2)
RBC # BLD AUTO: 4.63 M/UL (ref 3.8–5.2)
RBC #/AREA URNS HPF: ABNORMAL /HPF (ref 0–5)
SAO2 % BLD: 93 % (ref 92–97)
SERVICE CMNT-IMP: ABNORMAL
SODIUM SERPL-SCNC: 142 MMOL/L (ref 136–145)
SODIUM SERPL-SCNC: 145 MMOL/L (ref 136–145)
SODIUM SERPL-SCNC: 146 MMOL/L (ref 136–145)
SP GR UR REFRACTOMETRY: 1.01 (ref 1–1.03)
SPECIMEN TYPE: ABNORMAL
TOTAL RESP. RATE, ITRR: 21
TRIGL SERPL-MCNC: 114 MG/DL (ref ?–150)
TROPONIN I SERPL-MCNC: <0.05 NG/ML
TROPONIN I SERPL-MCNC: <0.05 NG/ML
TSH SERPL DL<=0.05 MIU/L-ACNC: 3.3 UIU/ML (ref 0.36–3.74)
UA: UC IF INDICATED,UAUC: ABNORMAL
UROBILINOGEN UR QL STRIP.AUTO: 0.2 EU/DL (ref 0.2–1)
VENTRICULAR RATE, ECG03: 74 BPM
VIT B12 SERPL-MCNC: 429 PG/ML (ref 193–986)
VLDLC SERPL CALC-MCNC: 22.8 MG/DL
WBC # BLD AUTO: 5.8 K/UL (ref 3.6–11)
WBC # BLD AUTO: 6 K/UL (ref 3.6–11)
WBC URNS QL MICRO: ABNORMAL /HPF (ref 0–4)

## 2020-01-01 PROCEDURE — 74011250636 HC RX REV CODE- 250/636: Performed by: INTERNAL MEDICINE

## 2020-01-01 PROCEDURE — 85610 PROTHROMBIN TIME: CPT

## 2020-01-01 PROCEDURE — 74011250637 HC RX REV CODE- 250/637: Performed by: INTERNAL MEDICINE

## 2020-01-01 PROCEDURE — 99285 EMERGENCY DEPT VISIT HI MDM: CPT

## 2020-01-01 PROCEDURE — 83880 ASSAY OF NATRIURETIC PEPTIDE: CPT

## 2020-01-01 PROCEDURE — 92526 ORAL FUNCTION THERAPY: CPT

## 2020-01-01 PROCEDURE — 65660000000 HC RM CCU STEPDOWN

## 2020-01-01 PROCEDURE — 70496 CT ANGIOGRAPHY HEAD: CPT

## 2020-01-01 PROCEDURE — 85025 COMPLETE CBC W/AUTO DIFF WBC: CPT

## 2020-01-01 PROCEDURE — 82803 BLOOD GASES ANY COMBINATION: CPT

## 2020-01-01 PROCEDURE — 82565 ASSAY OF CREATININE: CPT

## 2020-01-01 PROCEDURE — 84484 ASSAY OF TROPONIN QUANT: CPT

## 2020-01-01 PROCEDURE — 97161 PT EVAL LOW COMPLEX 20 MIN: CPT

## 2020-01-01 PROCEDURE — 97530 THERAPEUTIC ACTIVITIES: CPT

## 2020-01-01 PROCEDURE — 94760 N-INVAS EAR/PLS OXIMETRY 1: CPT

## 2020-01-01 PROCEDURE — 70450 CT HEAD/BRAIN W/O DYE: CPT

## 2020-01-01 PROCEDURE — 36600 WITHDRAWAL OF ARTERIAL BLOOD: CPT

## 2020-01-01 PROCEDURE — 36415 COLL VENOUS BLD VENIPUNCTURE: CPT

## 2020-01-01 PROCEDURE — 93306 TTE W/DOPPLER COMPLETE: CPT

## 2020-01-01 PROCEDURE — 82962 GLUCOSE BLOOD TEST: CPT

## 2020-01-01 PROCEDURE — 71045 X-RAY EXAM CHEST 1 VIEW: CPT

## 2020-01-01 PROCEDURE — 74011636637 HC RX REV CODE- 636/637: Performed by: INTERNAL MEDICINE

## 2020-01-01 PROCEDURE — 80307 DRUG TEST PRSMV CHEM ANLYZR: CPT

## 2020-01-01 PROCEDURE — 93308 TTE F-UP OR LMTD: CPT

## 2020-01-01 PROCEDURE — 80053 COMPREHEN METABOLIC PANEL: CPT

## 2020-01-01 PROCEDURE — 77030038269 HC DRN EXT URIN PURWCK BARD -A

## 2020-01-01 PROCEDURE — 70551 MRI BRAIN STEM W/O DYE: CPT

## 2020-01-01 PROCEDURE — 97165 OT EVAL LOW COMPLEX 30 MIN: CPT

## 2020-01-01 PROCEDURE — 80048 BASIC METABOLIC PNL TOTAL CA: CPT

## 2020-01-01 PROCEDURE — 80061 LIPID PANEL: CPT

## 2020-01-01 PROCEDURE — 82140 ASSAY OF AMMONIA: CPT

## 2020-01-01 PROCEDURE — 81001 URINALYSIS AUTO W/SCOPE: CPT

## 2020-01-01 PROCEDURE — 92610 EVALUATE SWALLOWING FUNCTION: CPT

## 2020-01-01 PROCEDURE — 84443 ASSAY THYROID STIM HORMONE: CPT

## 2020-01-01 PROCEDURE — 71046 X-RAY EXAM CHEST 2 VIEWS: CPT

## 2020-01-01 PROCEDURE — 82607 VITAMIN B-12: CPT

## 2020-01-01 PROCEDURE — 97535 SELF CARE MNGMENT TRAINING: CPT

## 2020-01-01 PROCEDURE — 93005 ELECTROCARDIOGRAM TRACING: CPT

## 2020-01-01 PROCEDURE — 74011636320 HC RX REV CODE- 636/320: Performed by: EMERGENCY MEDICINE

## 2020-01-01 RX ORDER — IPRATROPIUM BROMIDE AND ALBUTEROL SULFATE 2.5; .5 MG/3ML; MG/3ML
3 SOLUTION RESPIRATORY (INHALATION)
Status: DISCONTINUED | OUTPATIENT
Start: 2020-01-01 | End: 2020-01-01 | Stop reason: HOSPADM

## 2020-01-01 RX ORDER — SODIUM CHLORIDE 0.9 % (FLUSH) 0.9 %
10 SYRINGE (ML) INJECTION
Status: COMPLETED | OUTPATIENT
Start: 2020-01-01 | End: 2020-01-01

## 2020-01-01 RX ORDER — LABETALOL HYDROCHLORIDE 5 MG/ML
5 INJECTION, SOLUTION INTRAVENOUS
Status: DISCONTINUED | OUTPATIENT
Start: 2020-01-01 | End: 2020-01-01 | Stop reason: HOSPADM

## 2020-01-01 RX ORDER — TORSEMIDE 20 MG/1
20 TABLET ORAL DAILY
Status: DISCONTINUED | OUTPATIENT
Start: 2020-01-01 | End: 2020-01-01 | Stop reason: HOSPADM

## 2020-01-01 RX ORDER — WARFARIN SODIUM 5 MG/1
5 TABLET ORAL ONCE
Status: COMPLETED | OUTPATIENT
Start: 2020-01-01 | End: 2020-01-01

## 2020-01-01 RX ORDER — FUROSEMIDE 10 MG/ML
40 INJECTION INTRAMUSCULAR; INTRAVENOUS 2 TIMES DAILY
Status: DISCONTINUED | OUTPATIENT
Start: 2020-01-01 | End: 2020-01-01

## 2020-01-01 RX ORDER — CARVEDILOL 3.12 MG/1
3.12 TABLET ORAL 2 TIMES DAILY WITH MEALS
Status: DISCONTINUED | OUTPATIENT
Start: 2020-01-01 | End: 2020-01-01 | Stop reason: HOSPADM

## 2020-01-01 RX ORDER — POTASSIUM CHLORIDE 20 MEQ/1
40 TABLET, EXTENDED RELEASE ORAL
Status: COMPLETED | OUTPATIENT
Start: 2020-01-01 | End: 2020-01-01

## 2020-01-01 RX ORDER — BENZTROPINE MESYLATE 1 MG/1
1 TABLET ORAL 2 TIMES DAILY
Status: DISCONTINUED | OUTPATIENT
Start: 2020-01-01 | End: 2020-01-01 | Stop reason: HOSPADM

## 2020-01-01 RX ORDER — ATORVASTATIN CALCIUM 40 MG/1
40 TABLET, FILM COATED ORAL
Status: DISCONTINUED | OUTPATIENT
Start: 2020-01-01 | End: 2020-01-01 | Stop reason: HOSPADM

## 2020-01-01 RX ORDER — DEXTROSE AND POTASSIUM CHLORIDE 5; .15 G/100ML; G/100ML
SOLUTION INTRAVENOUS CONTINUOUS
Status: DISCONTINUED | OUTPATIENT
Start: 2020-01-01 | End: 2020-01-01

## 2020-01-01 RX ORDER — WARFARIN 2.5 MG/1
2.5 TABLET ORAL ONCE
Status: DISCONTINUED | OUTPATIENT
Start: 2020-01-01 | End: 2020-01-01 | Stop reason: HOSPADM

## 2020-01-01 RX ORDER — ACETAMINOPHEN 325 MG/1
650 TABLET ORAL
Status: DISCONTINUED | OUTPATIENT
Start: 2020-01-01 | End: 2020-01-01 | Stop reason: HOSPADM

## 2020-01-01 RX ORDER — TORSEMIDE 100 MG/1
100 TABLET ORAL DAILY
Status: DISCONTINUED | OUTPATIENT
Start: 2020-01-01 | End: 2020-01-01

## 2020-01-01 RX ORDER — SODIUM CHLORIDE 450 MG/100ML
75 INJECTION, SOLUTION INTRAVENOUS CONTINUOUS
Status: DISCONTINUED | OUTPATIENT
Start: 2020-01-01 | End: 2020-01-01

## 2020-01-01 RX ORDER — ENOXAPARIN SODIUM 100 MG/ML
90 INJECTION SUBCUTANEOUS EVERY 24 HOURS
Status: DISCONTINUED | OUTPATIENT
Start: 2020-01-01 | End: 2020-01-01

## 2020-01-01 RX ORDER — ACETAMINOPHEN 325 MG/1
650 TABLET ORAL
Status: DISCONTINUED | OUTPATIENT
Start: 2020-01-01 | End: 2020-01-01

## 2020-01-01 RX ORDER — DEXTROSE 50 % IN WATER (D50W) INTRAVENOUS SYRINGE
12.5-25 AS NEEDED
Status: DISCONTINUED | OUTPATIENT
Start: 2020-01-01 | End: 2020-01-01 | Stop reason: HOSPADM

## 2020-01-01 RX ORDER — ASPIRIN 300 MG/1
300 SUPPOSITORY RECTAL DAILY
Status: DISCONTINUED | OUTPATIENT
Start: 2020-01-01 | End: 2020-01-01

## 2020-01-01 RX ORDER — INSULIN LISPRO 100 [IU]/ML
INJECTION, SOLUTION INTRAVENOUS; SUBCUTANEOUS EVERY 6 HOURS
Status: DISCONTINUED | OUTPATIENT
Start: 2020-01-01 | End: 2020-01-01 | Stop reason: HOSPADM

## 2020-01-01 RX ORDER — ACETAMINOPHEN 650 MG/1
650 SUPPOSITORY RECTAL
Status: DISCONTINUED | OUTPATIENT
Start: 2020-01-01 | End: 2020-01-01 | Stop reason: HOSPADM

## 2020-01-01 RX ORDER — PRAVASTATIN SODIUM 10 MG/1
20 TABLET ORAL
Status: DISCONTINUED | OUTPATIENT
Start: 2020-01-01 | End: 2020-01-01 | Stop reason: SDUPTHER

## 2020-01-01 RX ORDER — ENOXAPARIN SODIUM 100 MG/ML
90 INJECTION SUBCUTANEOUS EVERY 24 HOURS
Status: COMPLETED | OUTPATIENT
Start: 2020-01-01 | End: 2020-01-01

## 2020-01-01 RX ORDER — MAGNESIUM SULFATE 100 %
4 CRYSTALS MISCELLANEOUS AS NEEDED
Status: DISCONTINUED | OUTPATIENT
Start: 2020-01-01 | End: 2020-01-01 | Stop reason: HOSPADM

## 2020-01-01 RX ORDER — GUAIFENESIN 100 MG/5ML
81 LIQUID (ML) ORAL DAILY
Status: DISCONTINUED | OUTPATIENT
Start: 2020-01-01 | End: 2020-01-01 | Stop reason: HOSPADM

## 2020-01-01 RX ADMIN — ENOXAPARIN SODIUM 90 MG: 100 INJECTION SUBCUTANEOUS at 18:23

## 2020-01-01 RX ADMIN — IOPAMIDOL 100 ML: 755 INJECTION, SOLUTION INTRAVENOUS at 10:18

## 2020-01-01 RX ADMIN — INSULIN LISPRO 4 UNITS: 100 INJECTION, SOLUTION INTRAVENOUS; SUBCUTANEOUS at 18:00

## 2020-01-01 RX ADMIN — BENZTROPINE MESYLATE 1 MG: 1 TABLET ORAL at 18:24

## 2020-01-01 RX ADMIN — BENZTROPINE MESYLATE 1 MG: 1 TABLET ORAL at 08:38

## 2020-01-01 RX ADMIN — BENZTROPINE MESYLATE 1 MG: 1 TABLET ORAL at 12:02

## 2020-01-01 RX ADMIN — INSULIN LISPRO 2 UNITS: 100 INJECTION, SOLUTION INTRAVENOUS; SUBCUTANEOUS at 06:57

## 2020-01-01 RX ADMIN — INSULIN LISPRO 5 UNITS: 100 INJECTION, SOLUTION INTRAVENOUS; SUBCUTANEOUS at 11:47

## 2020-01-01 RX ADMIN — WARFARIN SODIUM 5 MG: 5 TABLET ORAL at 18:23

## 2020-01-01 RX ADMIN — INSULIN LISPRO 2 UNITS: 100 INJECTION, SOLUTION INTRAVENOUS; SUBCUTANEOUS at 06:58

## 2020-01-01 RX ADMIN — ASPIRIN 81 MG 81 MG: 81 TABLET ORAL at 08:33

## 2020-01-01 RX ADMIN — ASPIRIN 81 MG 81 MG: 81 TABLET ORAL at 12:02

## 2020-01-01 RX ADMIN — TORSEMIDE 20 MG: 100 TABLET ORAL at 08:39

## 2020-01-01 RX ADMIN — ATORVASTATIN CALCIUM 40 MG: 40 TABLET, FILM COATED ORAL at 22:00

## 2020-01-01 RX ADMIN — WARFARIN SODIUM 5 MG: 5 TABLET ORAL at 18:01

## 2020-01-01 RX ADMIN — FUROSEMIDE 40 MG: 10 INJECTION, SOLUTION INTRAMUSCULAR; INTRAVENOUS at 18:50

## 2020-01-01 RX ADMIN — CARVEDILOL 3.12 MG: 3.12 TABLET, FILM COATED ORAL at 18:01

## 2020-01-01 RX ADMIN — ENOXAPARIN SODIUM 90 MG: 100 INJECTION SUBCUTANEOUS at 19:32

## 2020-01-01 RX ADMIN — CARVEDILOL 3.12 MG: 3.12 TABLET, FILM COATED ORAL at 08:39

## 2020-01-01 RX ADMIN — ATORVASTATIN CALCIUM 40 MG: 40 TABLET, FILM COATED ORAL at 21:46

## 2020-01-01 RX ADMIN — BENZTROPINE MESYLATE 1 MG: 1 TABLET ORAL at 08:33

## 2020-01-01 RX ADMIN — ATORVASTATIN CALCIUM 40 MG: 40 TABLET, FILM COATED ORAL at 22:04

## 2020-01-01 RX ADMIN — FUROSEMIDE 40 MG: 10 INJECTION, SOLUTION INTRAMUSCULAR; INTRAVENOUS at 10:14

## 2020-01-01 RX ADMIN — CARVEDILOL 3.12 MG: 3.12 TABLET, FILM COATED ORAL at 08:50

## 2020-01-01 RX ADMIN — ASPIRIN 300 MG: 300 SUPPOSITORY RECTAL at 10:14

## 2020-01-01 RX ADMIN — CARVEDILOL 3.12 MG: 3.12 TABLET, FILM COATED ORAL at 18:24

## 2020-01-01 RX ADMIN — BENZTROPINE MESYLATE 1 MG: 1 TABLET ORAL at 18:01

## 2020-01-01 RX ADMIN — POTASSIUM CHLORIDE 40 MEQ: 1500 TABLET, EXTENDED RELEASE ORAL at 18:24

## 2020-01-01 RX ADMIN — ASPIRIN 81 MG 81 MG: 81 TABLET ORAL at 08:39

## 2020-01-01 RX ADMIN — INSULIN LISPRO 3 UNITS: 100 INJECTION, SOLUTION INTRAVENOUS; SUBCUTANEOUS at 00:56

## 2020-01-01 RX ADMIN — Medication 10 ML: at 08:53

## 2020-01-13 ENCOUNTER — HOSPITAL ENCOUNTER (INPATIENT)
Age: 85
LOS: 2 days | Discharge: NURSING, REHAB OR LONG-TERM CARE | DRG: 638 | End: 2020-01-16
Attending: EMERGENCY MEDICINE | Admitting: INTERNAL MEDICINE
Payer: MEDICARE

## 2020-01-13 DIAGNOSIS — N17.9 ACUTE RENAL FAILURE, UNSPECIFIED ACUTE RENAL FAILURE TYPE (HCC): Primary | ICD-10-CM

## 2020-01-13 DIAGNOSIS — E11.649 HYPOGLYCEMIC EPISODE IN PATIENT WITH DIABETES MELLITUS (HCC): ICD-10-CM

## 2020-01-13 LAB
ALBUMIN SERPL-MCNC: 2.6 G/DL (ref 3.5–5)
ALBUMIN/GLOB SERPL: 0.5 {RATIO} (ref 1.1–2.2)
ALP SERPL-CCNC: 90 U/L (ref 45–117)
ALT SERPL-CCNC: 15 U/L (ref 12–78)
ANION GAP SERPL CALC-SCNC: 7 MMOL/L (ref 5–15)
AST SERPL-CCNC: 20 U/L (ref 15–37)
BASOPHILS # BLD: 0 K/UL (ref 0–0.1)
BASOPHILS NFR BLD: 0 % (ref 0–1)
BILIRUB SERPL-MCNC: 0.2 MG/DL (ref 0.2–1)
BUN SERPL-MCNC: 108 MG/DL (ref 6–20)
BUN/CREAT SERPL: 44 (ref 12–20)
CALCIUM SERPL-MCNC: 8.9 MG/DL (ref 8.5–10.1)
CHLORIDE SERPL-SCNC: 100 MMOL/L (ref 97–108)
CO2 SERPL-SCNC: 33 MMOL/L (ref 21–32)
CREAT SERPL-MCNC: 2.48 MG/DL (ref 0.55–1.02)
DIFFERENTIAL METHOD BLD: ABNORMAL
EOSINOPHIL # BLD: 0.3 K/UL (ref 0–0.4)
EOSINOPHIL NFR BLD: 4 % (ref 0–7)
ERYTHROCYTE [DISTWIDTH] IN BLOOD BY AUTOMATED COUNT: 18.6 % (ref 11.5–14.5)
GLOBULIN SER CALC-MCNC: 5.1 G/DL (ref 2–4)
GLUCOSE BLD STRIP.AUTO-MCNC: 163 MG/DL (ref 65–100)
GLUCOSE SERPL-MCNC: 188 MG/DL (ref 65–100)
HCT VFR BLD AUTO: 31.5 % (ref 35–47)
HGB BLD-MCNC: 9.6 G/DL (ref 11.5–16)
IMM GRANULOCYTES # BLD AUTO: 0 K/UL (ref 0–0.04)
IMM GRANULOCYTES NFR BLD AUTO: 0 % (ref 0–0.5)
LYMPHOCYTES # BLD: 1.5 K/UL (ref 0.8–3.5)
LYMPHOCYTES NFR BLD: 21 % (ref 12–49)
MAGNESIUM SERPL-MCNC: 2.2 MG/DL (ref 1.6–2.4)
MCH RBC QN AUTO: 22.6 PG (ref 26–34)
MCHC RBC AUTO-ENTMCNC: 30.5 G/DL (ref 30–36.5)
MCV RBC AUTO: 74.3 FL (ref 80–99)
MONOCYTES # BLD: 0.6 K/UL (ref 0–1)
MONOCYTES NFR BLD: 9 % (ref 5–13)
NEUTS SEG # BLD: 4.6 K/UL (ref 1.8–8)
NEUTS SEG NFR BLD: 66 % (ref 32–75)
NRBC # BLD: 0 K/UL (ref 0–0.01)
NRBC BLD-RTO: 0 PER 100 WBC
PHOSPHATE SERPL-MCNC: 3.9 MG/DL (ref 2.6–4.7)
PLATELET # BLD AUTO: 287 K/UL (ref 150–400)
PMV BLD AUTO: 12.1 FL (ref 8.9–12.9)
POTASSIUM SERPL-SCNC: 3.8 MMOL/L (ref 3.5–5.1)
PROT SERPL-MCNC: 7.7 G/DL (ref 6.4–8.2)
RBC # BLD AUTO: 4.24 M/UL (ref 3.8–5.2)
SERVICE CMNT-IMP: ABNORMAL
SODIUM SERPL-SCNC: 140 MMOL/L (ref 136–145)
WBC # BLD AUTO: 7 K/UL (ref 3.6–11)

## 2020-01-13 PROCEDURE — 82962 GLUCOSE BLOOD TEST: CPT

## 2020-01-13 PROCEDURE — 84100 ASSAY OF PHOSPHORUS: CPT

## 2020-01-13 PROCEDURE — 83735 ASSAY OF MAGNESIUM: CPT

## 2020-01-13 PROCEDURE — 85610 PROTHROMBIN TIME: CPT

## 2020-01-13 PROCEDURE — 36415 COLL VENOUS BLD VENIPUNCTURE: CPT

## 2020-01-13 PROCEDURE — 85730 THROMBOPLASTIN TIME PARTIAL: CPT

## 2020-01-13 PROCEDURE — 93005 ELECTROCARDIOGRAM TRACING: CPT

## 2020-01-13 PROCEDURE — P9612 CATHETERIZE FOR URINE SPEC: HCPCS

## 2020-01-13 PROCEDURE — 80053 COMPREHEN METABOLIC PANEL: CPT

## 2020-01-13 PROCEDURE — 81001 URINALYSIS AUTO W/SCOPE: CPT

## 2020-01-13 PROCEDURE — 85025 COMPLETE CBC W/AUTO DIFF WBC: CPT

## 2020-01-13 PROCEDURE — 99285 EMERGENCY DEPT VISIT HI MDM: CPT

## 2020-01-13 RX ORDER — SODIUM CHLORIDE 0.9 % (FLUSH) 0.9 %
5-40 SYRINGE (ML) INJECTION AS NEEDED
Status: DISCONTINUED | OUTPATIENT
Start: 2020-01-13 | End: 2020-01-14 | Stop reason: SDUPTHER

## 2020-01-13 RX ORDER — SODIUM CHLORIDE 0.9 % (FLUSH) 0.9 %
5-40 SYRINGE (ML) INJECTION EVERY 8 HOURS
Status: DISCONTINUED | OUTPATIENT
Start: 2020-01-13 | End: 2020-01-14 | Stop reason: SDUPTHER

## 2020-01-13 RX ADMIN — Medication 10 ML: at 23:32

## 2020-01-14 ENCOUNTER — APPOINTMENT (OUTPATIENT)
Dept: GENERAL RADIOLOGY | Age: 85
DRG: 638 | End: 2020-01-14
Attending: EMERGENCY MEDICINE
Payer: MEDICARE

## 2020-01-14 ENCOUNTER — APPOINTMENT (OUTPATIENT)
Dept: CT IMAGING | Age: 85
DRG: 638 | End: 2020-01-14
Attending: INTERNAL MEDICINE
Payer: MEDICARE

## 2020-01-14 PROBLEM — E16.2 HYPOGLYCEMIA: Status: ACTIVE | Noted: 2020-01-14

## 2020-01-14 LAB
ANION GAP SERPL CALC-SCNC: 4 MMOL/L (ref 5–15)
APPEARANCE UR: CLEAR
APTT PPP: 38.1 SEC (ref 22.1–32)
ATRIAL RATE: 91 BPM
BACTERIA URNS QL MICRO: NEGATIVE /HPF
BILIRUB UR QL: NEGATIVE
BUN SERPL-MCNC: 98 MG/DL (ref 6–20)
BUN/CREAT SERPL: 45 (ref 12–20)
CALCIUM SERPL-MCNC: 9.2 MG/DL (ref 8.5–10.1)
CALCULATED R AXIS, ECG10: -8 DEGREES
CALCULATED T AXIS, ECG11: -92 DEGREES
CHLORIDE SERPL-SCNC: 107 MMOL/L (ref 97–108)
CHOLEST SERPL-MCNC: 111 MG/DL
CO2 SERPL-SCNC: 35 MMOL/L (ref 21–32)
COLOR UR: NORMAL
CREAT SERPL-MCNC: 2.2 MG/DL (ref 0.55–1.02)
DIAGNOSIS, 93000: NORMAL
EPITH CASTS URNS QL MICRO: NORMAL /LPF
ERYTHROCYTE [DISTWIDTH] IN BLOOD BY AUTOMATED COUNT: 17.7 % (ref 11.5–14.5)
EST. AVERAGE GLUCOSE BLD GHB EST-MCNC: 166 MG/DL
GLUCOSE BLD STRIP.AUTO-MCNC: 108 MG/DL (ref 65–100)
GLUCOSE BLD STRIP.AUTO-MCNC: 141 MG/DL (ref 65–100)
GLUCOSE BLD STRIP.AUTO-MCNC: 146 MG/DL (ref 65–100)
GLUCOSE BLD STRIP.AUTO-MCNC: 192 MG/DL (ref 65–100)
GLUCOSE BLD STRIP.AUTO-MCNC: 197 MG/DL (ref 65–100)
GLUCOSE BLD STRIP.AUTO-MCNC: 210 MG/DL (ref 65–100)
GLUCOSE BLD STRIP.AUTO-MCNC: 390 MG/DL (ref 65–100)
GLUCOSE BLD STRIP.AUTO-MCNC: 48 MG/DL (ref 65–100)
GLUCOSE BLD STRIP.AUTO-MCNC: 53 MG/DL (ref 65–100)
GLUCOSE BLD STRIP.AUTO-MCNC: 71 MG/DL (ref 65–100)
GLUCOSE BLD STRIP.AUTO-MCNC: 87 MG/DL (ref 65–100)
GLUCOSE SERPL-MCNC: 79 MG/DL (ref 65–100)
GLUCOSE UR STRIP.AUTO-MCNC: NEGATIVE MG/DL
HBA1C MFR BLD: 7.4 % (ref 4–5.6)
HCT VFR BLD AUTO: 30.8 % (ref 35–47)
HDLC SERPL-MCNC: 37 MG/DL
HDLC SERPL: 3 {RATIO} (ref 0–5)
HGB BLD-MCNC: 9.6 G/DL (ref 11.5–16)
HGB UR QL STRIP: NEGATIVE
HYALINE CASTS URNS QL MICRO: NORMAL /LPF (ref 0–5)
INR PPP: 2.7 (ref 0.9–1.1)
INR PPP: 2.9 (ref 0.9–1.1)
KETONES UR QL STRIP.AUTO: NEGATIVE MG/DL
LACTATE SERPL-SCNC: 1.2 MMOL/L (ref 0.4–2)
LDLC SERPL CALC-MCNC: 61.8 MG/DL (ref 0–100)
LEUKOCYTE ESTERASE UR QL STRIP.AUTO: NEGATIVE
LIPID PROFILE,FLP: NORMAL
MCH RBC QN AUTO: 23.1 PG (ref 26–34)
MCHC RBC AUTO-ENTMCNC: 31.2 G/DL (ref 30–36.5)
MCV RBC AUTO: 74.2 FL (ref 80–99)
NITRITE UR QL STRIP.AUTO: NEGATIVE
NRBC # BLD: 0 K/UL (ref 0–0.01)
NRBC BLD-RTO: 0 PER 100 WBC
PH UR STRIP: 6 [PH] (ref 5–8)
PLATELET # BLD AUTO: 244 K/UL (ref 150–400)
PMV BLD AUTO: 10.6 FL (ref 8.9–12.9)
POTASSIUM SERPL-SCNC: 3.3 MMOL/L (ref 3.5–5.1)
PROT UR STRIP-MCNC: NEGATIVE MG/DL
PROTHROMBIN TIME: 25.9 SEC (ref 9–11.1)
PROTHROMBIN TIME: 28.3 SEC (ref 9–11.1)
Q-T INTERVAL, ECG07: 388 MS
QRS DURATION, ECG06: 80 MS
QTC CALCULATION (BEZET), ECG08: 485 MS
RBC # BLD AUTO: 4.15 M/UL (ref 3.8–5.2)
RBC #/AREA URNS HPF: NORMAL /HPF (ref 0–5)
SERVICE CMNT-IMP: ABNORMAL
SERVICE CMNT-IMP: NORMAL
SERVICE CMNT-IMP: NORMAL
SODIUM SERPL-SCNC: 146 MMOL/L (ref 136–145)
SP GR UR REFRACTOMETRY: 1.01 (ref 1–1.03)
THERAPEUTIC RANGE,PTTT: ABNORMAL SECS (ref 58–77)
TRIGL SERPL-MCNC: 61 MG/DL (ref ?–150)
TSH SERPL DL<=0.05 MIU/L-ACNC: 4.29 UIU/ML (ref 0.36–3.74)
UA: UC IF INDICATED,UAUC: NORMAL
UROBILINOGEN UR QL STRIP.AUTO: 0.2 EU/DL (ref 0.2–1)
VENTRICULAR RATE, ECG03: 94 BPM
VLDLC SERPL CALC-MCNC: 12.2 MG/DL
WBC # BLD AUTO: 5.3 K/UL (ref 3.6–11)
WBC URNS QL MICRO: NORMAL /HPF (ref 0–4)

## 2020-01-14 PROCEDURE — 74011250637 HC RX REV CODE- 250/637: Performed by: INTERNAL MEDICINE

## 2020-01-14 PROCEDURE — 85027 COMPLETE CBC AUTOMATED: CPT

## 2020-01-14 PROCEDURE — 80048 BASIC METABOLIC PNL TOTAL CA: CPT

## 2020-01-14 PROCEDURE — 65660000000 HC RM CCU STEPDOWN

## 2020-01-14 PROCEDURE — 85610 PROTHROMBIN TIME: CPT

## 2020-01-14 PROCEDURE — 83605 ASSAY OF LACTIC ACID: CPT

## 2020-01-14 PROCEDURE — 84443 ASSAY THYROID STIM HORMONE: CPT

## 2020-01-14 PROCEDURE — 36415 COLL VENOUS BLD VENIPUNCTURE: CPT

## 2020-01-14 PROCEDURE — 80061 LIPID PANEL: CPT

## 2020-01-14 PROCEDURE — 82962 GLUCOSE BLOOD TEST: CPT

## 2020-01-14 PROCEDURE — 71045 X-RAY EXAM CHEST 1 VIEW: CPT

## 2020-01-14 PROCEDURE — 70450 CT HEAD/BRAIN W/O DYE: CPT

## 2020-01-14 PROCEDURE — 83036 HEMOGLOBIN GLYCOSYLATED A1C: CPT

## 2020-01-14 PROCEDURE — 74011000258 HC RX REV CODE- 258: Performed by: INTERNAL MEDICINE

## 2020-01-14 PROCEDURE — 87040 BLOOD CULTURE FOR BACTERIA: CPT

## 2020-01-14 RX ORDER — IPRATROPIUM BROMIDE AND ALBUTEROL SULFATE 2.5; .5 MG/3ML; MG/3ML
3 SOLUTION RESPIRATORY (INHALATION)
COMMUNITY

## 2020-01-14 RX ORDER — POTASSIUM CHLORIDE 20 MEQ/1
40 TABLET, EXTENDED RELEASE ORAL
Status: COMPLETED | OUTPATIENT
Start: 2020-01-14 | End: 2020-01-14

## 2020-01-14 RX ORDER — DEXTROSE MONOHYDRATE 100 MG/ML
0-250 INJECTION, SOLUTION INTRAVENOUS AS NEEDED
Status: DISCONTINUED | OUTPATIENT
Start: 2020-01-14 | End: 2020-01-16 | Stop reason: HOSPADM

## 2020-01-14 RX ORDER — ONDANSETRON 2 MG/ML
4 INJECTION INTRAMUSCULAR; INTRAVENOUS
Status: DISCONTINUED | OUTPATIENT
Start: 2020-01-14 | End: 2020-01-16 | Stop reason: HOSPADM

## 2020-01-14 RX ORDER — DEXTROSE MONOHYDRATE 100 MG/ML
0-250 INJECTION, SOLUTION INTRAVENOUS AS NEEDED
Status: DISCONTINUED | OUTPATIENT
Start: 2020-01-14 | End: 2020-01-14 | Stop reason: SDUPTHER

## 2020-01-14 RX ORDER — MEMANTINE HYDROCHLORIDE 10 MG/1
10 TABLET ORAL 2 TIMES DAILY
Status: DISCONTINUED | OUTPATIENT
Start: 2020-01-14 | End: 2020-01-16 | Stop reason: HOSPADM

## 2020-01-14 RX ORDER — WARFARIN 2.5 MG/1
2.5 TABLET ORAL
COMMUNITY

## 2020-01-14 RX ORDER — DEXTROSE MONOHYDRATE AND SODIUM CHLORIDE 5; .9 G/100ML; G/100ML
75 INJECTION, SOLUTION INTRAVENOUS CONTINUOUS
Status: DISCONTINUED | OUTPATIENT
Start: 2020-01-14 | End: 2020-01-15

## 2020-01-14 RX ORDER — SODIUM CHLORIDE 0.9 % (FLUSH) 0.9 %
5-40 SYRINGE (ML) INJECTION AS NEEDED
Status: DISCONTINUED | OUTPATIENT
Start: 2020-01-14 | End: 2020-01-16 | Stop reason: HOSPADM

## 2020-01-14 RX ORDER — DEXTROSE MONOHYDRATE AND SODIUM CHLORIDE 5; .9 G/100ML; G/100ML
75 INJECTION, SOLUTION INTRAVENOUS CONTINUOUS
Status: DISCONTINUED | OUTPATIENT
Start: 2020-01-14 | End: 2020-01-14

## 2020-01-14 RX ORDER — INSULIN LISPRO 100 [IU]/ML
INJECTION, SOLUTION INTRAVENOUS; SUBCUTANEOUS
Status: DISCONTINUED | OUTPATIENT
Start: 2020-01-14 | End: 2020-01-14

## 2020-01-14 RX ORDER — CARVEDILOL 3.12 MG/1
3.12 TABLET ORAL 2 TIMES DAILY WITH MEALS
Status: DISCONTINUED | OUTPATIENT
Start: 2020-01-14 | End: 2020-01-16 | Stop reason: HOSPADM

## 2020-01-14 RX ORDER — SODIUM CHLORIDE 9 MG/ML
125 INJECTION, SOLUTION INTRAVENOUS CONTINUOUS
Status: DISCONTINUED | OUTPATIENT
Start: 2020-01-14 | End: 2020-01-14

## 2020-01-14 RX ORDER — PRAVASTATIN SODIUM 10 MG/1
20 TABLET ORAL
Status: DISCONTINUED | OUTPATIENT
Start: 2020-01-14 | End: 2020-01-16 | Stop reason: HOSPADM

## 2020-01-14 RX ORDER — ACETAMINOPHEN 325 MG/1
650 TABLET ORAL
Status: DISCONTINUED | OUTPATIENT
Start: 2020-01-14 | End: 2020-01-16 | Stop reason: HOSPADM

## 2020-01-14 RX ORDER — BENZTROPINE MESYLATE 1 MG/1
1 TABLET ORAL 2 TIMES DAILY
Status: DISCONTINUED | OUTPATIENT
Start: 2020-01-14 | End: 2020-01-16 | Stop reason: HOSPADM

## 2020-01-14 RX ORDER — MAGNESIUM SULFATE 100 %
4 CRYSTALS MISCELLANEOUS AS NEEDED
Status: DISCONTINUED | OUTPATIENT
Start: 2020-01-14 | End: 2020-01-16 | Stop reason: HOSPADM

## 2020-01-14 RX ORDER — BISACODYL 5 MG
5 TABLET, DELAYED RELEASE (ENTERIC COATED) ORAL DAILY PRN
Status: DISCONTINUED | OUTPATIENT
Start: 2020-01-14 | End: 2020-01-16 | Stop reason: HOSPADM

## 2020-01-14 RX ORDER — SODIUM CHLORIDE 0.9 % (FLUSH) 0.9 %
5-40 SYRINGE (ML) INJECTION EVERY 8 HOURS
Status: DISCONTINUED | OUTPATIENT
Start: 2020-01-14 | End: 2020-01-16 | Stop reason: HOSPADM

## 2020-01-14 RX ORDER — AMLODIPINE BESYLATE 5 MG/1
10 TABLET ORAL DAILY
Status: DISCONTINUED | OUTPATIENT
Start: 2020-01-14 | End: 2020-01-16 | Stop reason: HOSPADM

## 2020-01-14 RX ORDER — TORSEMIDE 100 MG/1
100 TABLET ORAL DAILY
Status: DISCONTINUED | OUTPATIENT
Start: 2020-01-14 | End: 2020-01-14

## 2020-01-14 RX ORDER — WARFARIN 1 MG/1
1 TABLET ORAL ONCE
Status: COMPLETED | OUTPATIENT
Start: 2020-01-14 | End: 2020-01-14

## 2020-01-14 RX ORDER — PANTOPRAZOLE SODIUM 40 MG/1
40 TABLET, DELAYED RELEASE ORAL DAILY
Status: DISCONTINUED | OUTPATIENT
Start: 2020-01-14 | End: 2020-01-16 | Stop reason: HOSPADM

## 2020-01-14 RX ORDER — INSULIN LISPRO 100 [IU]/ML
INJECTION, SOLUTION INTRAVENOUS; SUBCUTANEOUS
Status: DISCONTINUED | OUTPATIENT
Start: 2020-01-14 | End: 2020-01-16 | Stop reason: HOSPADM

## 2020-01-14 RX ORDER — LANOLIN ALCOHOL/MO/W.PET/CERES
325 CREAM (GRAM) TOPICAL 2 TIMES DAILY WITH MEALS
Status: DISCONTINUED | OUTPATIENT
Start: 2020-01-14 | End: 2020-01-16 | Stop reason: HOSPADM

## 2020-01-14 RX ADMIN — CARVEDILOL 3.12 MG: 3.12 TABLET, FILM COATED ORAL at 10:23

## 2020-01-14 RX ADMIN — Medication 10 ML: at 07:17

## 2020-01-14 RX ADMIN — CARVEDILOL 3.12 MG: 3.12 TABLET, FILM COATED ORAL at 18:46

## 2020-01-14 RX ADMIN — PRAVASTATIN SODIUM 20 MG: 10 TABLET ORAL at 23:32

## 2020-01-14 RX ADMIN — POTASSIUM CHLORIDE 40 MEQ: 1500 TABLET, EXTENDED RELEASE ORAL at 11:42

## 2020-01-14 RX ADMIN — MEMANTINE HYDROCHLORIDE 10 MG: 10 TABLET ORAL at 10:23

## 2020-01-14 RX ADMIN — BENZTROPINE MESYLATE 1 MG: 1 TABLET ORAL at 10:24

## 2020-01-14 RX ADMIN — BENZTROPINE MESYLATE 1 MG: 1 TABLET ORAL at 18:46

## 2020-01-14 RX ADMIN — Medication 10 ML: at 01:45

## 2020-01-14 RX ADMIN — Medication 10 ML: at 23:33

## 2020-01-14 RX ADMIN — MEMANTINE HYDROCHLORIDE 10 MG: 10 TABLET ORAL at 18:46

## 2020-01-14 RX ADMIN — PANTOPRAZOLE SODIUM 40 MG: 40 TABLET, DELAYED RELEASE ORAL at 10:23

## 2020-01-14 RX ADMIN — FERROUS SULFATE TAB 325 MG (65 MG ELEMENTAL FE) 325 MG: 325 (65 FE) TAB at 10:24

## 2020-01-14 RX ADMIN — DEXTROSE MONOHYDRATE AND SODIUM CHLORIDE 75 ML/HR: 5; .9 INJECTION, SOLUTION INTRAVENOUS at 06:50

## 2020-01-14 RX ADMIN — DEXTROSE MONOHYDRATE AND SODIUM CHLORIDE 75 ML/HR: 5; .9 INJECTION, SOLUTION INTRAVENOUS at 02:59

## 2020-01-14 RX ADMIN — FERROUS SULFATE TAB 325 MG (65 MG ELEMENTAL FE) 325 MG: 325 (65 FE) TAB at 18:45

## 2020-01-14 RX ADMIN — DEXTROSE MONOHYDRATE AND SODIUM CHLORIDE 75 ML/HR: 5; .9 INJECTION, SOLUTION INTRAVENOUS at 20:06

## 2020-01-14 RX ADMIN — WARFARIN SODIUM 1 MG: 1 TABLET ORAL at 18:45

## 2020-01-14 NOTE — H&P
Hospitalist Admission Note    NAME: Milton Day   :  1927   MRN:  [id number]     Date/Time:  2020 12:52 AM    Patient PCP: Chuy Interiano MD  ______________________________________________________________________  Given the patient's current clinical presentation, I have a high level of concern for decompensation if discharged from the emergency department. Complex decision making was performed, which includes reviewing the patient's available past medical records, laboratory results, and x-ray films. My assessment of this patient's clinical condition and my plan of care is as follows. Assessment / Plan:  Hypoglycemia   Start patient on D5 NS   Hold oral hypoglycemic agent    Atrial fibrillation   Continue Coumadin    Chronic kidney disease   Renal function close to baseline    DM   Start patient sliding scale insulin    Dementia   Continue home medication    History of chronic combined systolic and diastolic heart failure  -continue  torsemide       Code Status: DNR  Surrogate Decision Maker: Joey Isidro    DVT Prophylaxis: coumadin   GI Prophylaxis: not indicated          Subjective:   CHIEF COMPLAINT: hypoglycemia     HISTORY OF PRESENT ILLNESS:     1years old female from home with past medical history significant for CHF, dementia, DM, hypertension was transferred from [place] [address] for evaluation of hypoglycemia blood sugar check was found to be 41, blood work was done and show blood glucose 188, BUN  108 , creatinine 2.48 .  we were asked to admit for work up and evaluation of the above problems.      Past Medical History:   Diagnosis Date    [facility] ([facility]     [facility]congestive heart failure) ([facility]     Dementia ([facility]     [facility]     type II    Elevated troponin 2015    Essential hypertension     Fall as cause of accidental injury in residential institution as place of occurrence 2015    GERD (gastroesophageal reflux disease)  Heart failure (HCC)     Hyperlipidemia     Hyperlipidemia     Hypoglycemia     Ill-defined condition     embolism (DVT)    Paroxysmal atrial fibrillation (HCC)     Psychiatric disorder     anxiety        Past Surgical History:   Procedure Laterality Date    HX APPENDECTOMY         Social History     Tobacco Use    Smoking status: Never Smoker    Smokeless tobacco: Never Used   Substance Use Topics    Alcohol use: No         family history on file. unable to obtain due to dementia   Allergies   Allergen Reactions    Influenza Virus Vaccine, Specific Hives    Penicillins Unknown (comments)    Sulfacetamide Unknown (comments)        Prior to Admission medications    Medication Sig Start Date End Date Taking? Authorizing Provider   insulin lispro (HUMALOG U-100 INSULIN) 100 unit/mL injection 5 Units by SubCUTAneous route. Provider, Historical   alendronate (FOSAMAX) 70 mg tablet Take 70 mg by mouth every seven (7) days. Provider, Historical   fluPHENAZine (PROLIXIN) 1 mg tablet Take 1 mg by mouth daily. Patient takes 1 mg QAM and 2 mg QHS    Provider, Historical   fluPHENAZine (PROLIXIN) 1 mg tablet Take 2 mg by mouth nightly. Patient takes 1 mg QAM and 2 mg QHS    Provider, Historical   insulin glargine (LANTUS U-100 INSULIN) 100 unit/mL injection 22 Units by SubCUTAneous route nightly. Provider, Historical   pantoprazole (PROTONIX) 40 mg tablet Take 40 mg by mouth daily. Provider, Historical   torsemide (DEMADEX) 100 mg tablet Take 100 mg by mouth daily. Provider, Historical   acetaminophen (TYLENOL) 325 mg tablet Take 650 mg by mouth every six (6) hours as needed for Pain. Provider, Historical   allopurinol (ZYLOPRIM) 100 mg tablet Take 100 mg by mouth daily. Provider, Historical   amLODIPine (NORVASC) 10 mg tablet Take 10 mg by mouth daily. Provider, Historical   warfarin (COUMADIN) 5 mg tablet Take 5 mg by mouth four (4) days a week.  Patient takes 5 mg M-Th-F-Su evenings, and 7.5 mg Tu-W-Sa evenings    Provider, Historical   carvedilol (COREG) 3.125 mg tablet Take 3.125 mg by mouth two (2) times daily (with meals). 12/15/15   Bijan Collins MD   warfarin (COUMADIN) 7.5 mg tablet Take 7.5 mg by mouth three (3) days a week. Patient takes 5 mg M-Th-F-Su evenings, and 7.5 mg Tu-W-Sa evenings 12/15/15   Bijan Collins MD   ferrous sulfate (IRON) 325 mg (65 mg iron) EC tablet Take 325 mg by mouth Before breakfast and dinner. Other, MD Doug   benztropine (COGENTIN) 1 mg tablet Take 1 mg by mouth two (2) times a day. Provider, Historical   SITagliptin (JANUVIA) 50 mg tablet Take 50 mg by mouth daily. Provider, Historical   memantine ER (NAMENDA XR) 28 mg capsule Take 28 mg by mouth daily. Provider, Historical   potassium chloride (KAON 10%) 20 mEq/15 mL solution Take 20 mEq by mouth daily. Provider, Historical   pravastatin (PRAVACHOL) 20 mg tablet Take 20 mg by mouth nightly. Provider, Historical       REVIEW OF SYSTEMS:     I am not able to complete the review of systems because:    The patient is intubated and sedated    The patient has altered mental status due to his acute medical problems    The patient has baseline aphasia from prior stroke(s)   y The patient has baseline dementia and is not reliable historian    The patient is in acute medical distress and unable to provide information           Total of 12 systems reviewed as follows:       POSITIVE= underlined text  Negative = text not underlined  General:  fever, chills, sweats, generalized weakness, weight loss/gain,      loss of appetite   Eyes:    blurred vision, eye pain, loss of vision, double vision  ENT:    rhinorrhea, pharyngitis   Respiratory:   cough, sputum production, SOB, BUTTS, wheezing, pleuritic pain   Cardiology:   chest pain, palpitations, orthopnea, PND, edema, syncope   Gastrointestinal:  abdominal pain , N/V, diarrhea, dysphagia, constipation, bleeding   Genitourinary:  frequency, urgency, dysuria, hematuria, incontinence   Muskuloskeletal :  arthralgia, myalgia, back pain  Hematology:  easy bruising, nose or gum bleeding, lymphadenopathy   Dermatological: rash, ulceration, pruritis, color change / jaundice  Endocrine:   hot flashes or polydipsia   Neurological:  headache, dizziness, confusion, focal weakness, paresthesia,     Speech difficulties, memory loss, gait difficulty  Psychological: Feelings of anxiety, depression, agitation    Objective:   VITALS:    Visit Vitals  /48   Pulse 65   Temp 97.9 °F (36.6 °C)   Resp 18   Ht 5' 5\" (1.651 m)   Wt 79 kg (174 lb 2.6 oz)   SpO2 97%   BMI 28.98 kg/m²       PHYSICAL EXAM:    General:    Alert, cooperative, no distress, appears stated age. HEENT: Atraumatic, anicteric sclerae, pink conjunctivae     No oral ulcers, mucosa moist, throat clear, dentition fair  Neck:  Supple, symmetrical,  thyroid: non tender  Lungs:   Clear to auscultation bilaterally. No Wheezing or Rhonchi. No rales. Chest wall:  No tenderness  No Accessory muscle use. Heart:   Regular  rhythm,  No  murmur   No edema  Abdomen:   Soft, non-tender. Not distended. Bowel sounds normal  Extremities: No cyanosis. No clubbing,      Skin turgor normal, Capillary refill normal, Radial dial pulse 2+  Skin:     Not pale. Not Jaundiced  No rashes   Psych:  Good insight. Not depressed. Not anxious or agitated. Neurologic: EOMs intact. No facial asymmetry. No aphasia or slurred speech. Symmetrical strength, Sensation grossly intact.  Alert and oriented X 1    _______________________________________________________________________  Care Plan discussed with:    Comments   Patient y    Family      RN y    Care Manager                    Consultant:      _______________________________________________________________________  Expected  Disposition:   Home with Family y   HH/PT/OT/RN    SNF/LTC    MARY    ________________________________________________________________________  TOTAL TIME:  61 Minutes    Critical Care Provided     Minutes non procedure based      Comments    y Reviewed previous records   >50% of visit spent in counseling and coordination of care y Discussion with patient and/or family and questions answered       ________________________________________________________________________  Signed: Brigitte Aguilar MD    Procedures: see electronic medical records for all procedures/Xrays and details which were not copied into this note but were reviewed prior to creation of Plan. LAB DATA REVIEWED:    Recent Results (from the past 24 hour(s))   GLUCOSE, POC    Collection Time: 01/13/20  9:17 PM   Result Value Ref Range    Glucose (POC) 163 (H) 65 - 100 mg/dL    Performed by Fredy Sepulveda RN (traveller)    CBC WITH AUTOMATED DIFF    Collection Time: 01/13/20 10:39 PM   Result Value Ref Range    WBC 7.0 3.6 - 11.0 K/uL    RBC 4.24 3.80 - 5.20 M/uL    HGB 9.6 (L) 11.5 - 16.0 g/dL    HCT 31.5 (L) 35.0 - 47.0 %    MCV 74.3 (L) 80.0 - 99.0 FL    MCH 22.6 (L) 26.0 - 34.0 PG    MCHC 30.5 30.0 - 36.5 g/dL    RDW 18.6 (H) 11.5 - 14.5 %    PLATELET 543 729 - 003 K/uL    MPV 12.1 8.9 - 12.9 FL    NRBC 0.0 0  WBC    ABSOLUTE NRBC 0.00 0.00 - 0.01 K/uL    NEUTROPHILS 66 32 - 75 %    LYMPHOCYTES 21 12 - 49 %    MONOCYTES 9 5 - 13 %    EOSINOPHILS 4 0 - 7 %    BASOPHILS 0 0 - 1 %    IMMATURE GRANULOCYTES 0 0.0 - 0.5 %    ABS. NEUTROPHILS 4.6 1.8 - 8.0 K/UL    ABS. LYMPHOCYTES 1.5 0.8 - 3.5 K/UL    ABS. MONOCYTES 0.6 0.0 - 1.0 K/UL    ABS. EOSINOPHILS 0.3 0.0 - 0.4 K/UL    ABS. BASOPHILS 0.0 0.0 - 0.1 K/UL    ABS. IMM.  GRANS. 0.0 0.00 - 0.04 K/UL    DF AUTOMATED     MAGNESIUM    Collection Time: 01/13/20 10:39 PM   Result Value Ref Range    Magnesium 2.2 1.6 - 2.4 mg/dL   PHOSPHORUS    Collection Time: 01/13/20 10:39 PM   Result Value Ref Range    Phosphorus 3.9 2.6 - 4.7 MG/DL   METABOLIC PANEL, COMPREHENSIVE    Collection Time: 01/13/20 10:39 PM   Result Value Ref Range    Sodium 140 136 - 145 mmol/L Potassium 3.8 3.5 - 5.1 mmol/L    Chloride 100 97 - 108 mmol/L    CO2 33 (H) 21 - 32 mmol/L    Anion gap 7 5 - 15 mmol/L    Glucose 188 (H) 65 - 100 mg/dL     (H) 6 - 20 MG/DL    Creatinine 2.48 (H) 0.55 - 1.02 MG/DL    BUN/Creatinine ratio 44 (H) 12 - 20      GFR est AA 22 (L) >60 ml/min/1.73m2    GFR est non-AA 18 (L) >60 ml/min/1.73m2    Calcium 8.9 8.5 - 10.1 MG/DL    Bilirubin, total 0.2 0.2 - 1.0 MG/DL    ALT (SGPT) 15 12 - 78 U/L    AST (SGOT) 20 15 - 37 U/L    Alk.  phosphatase 90 45 - 117 U/L    Protein, total 7.7 6.4 - 8.2 g/dL    Albumin 2.6 (L) 3.5 - 5.0 g/dL    Globulin 5.1 (H) 2.0 - 4.0 g/dL    A-G Ratio 0.5 (L) 1.1 - 2.2     URINALYSIS W/ REFLEX CULTURE    Collection Time: 01/13/20 11:06 PM   Result Value Ref Range    Color YELLOW/STRAW      Appearance CLEAR CLEAR      Specific gravity 1.012 1.003 - 1.030      pH (UA) 6.0 5.0 - 8.0      Protein NEGATIVE  NEG mg/dL    Glucose NEGATIVE  NEG mg/dL    Ketone NEGATIVE  NEG mg/dL    Bilirubin NEGATIVE  NEG      Blood NEGATIVE  NEG      Urobilinogen 0.2 0.2 - 1.0 EU/dL    Nitrites NEGATIVE  NEG      Leukocyte Esterase NEGATIVE  NEG      WBC 0-4 0 - 4 /hpf    RBC 0-5 0 - 5 /hpf    Epithelial cells FEW FEW /lpf    Bacteria NEGATIVE  NEG /hpf    UA:UC IF INDICATED CULTURE NOT INDICATED BY UA RESULT CNI      Hyaline cast 0-2 0 - 5 /lpf   PTT    Collection Time: 01/13/20 11:32 PM   Result Value Ref Range    aPTT 38.1 (H) 22.1 - 32.0 sec    aPTT, therapeutic range     58.0 - 77.0 SECS   PROTHROMBIN TIME + INR    Collection Time: 01/13/20 11:32 PM   Result Value Ref Range    INR 2.7 (H) 0.9 - 1.1      Prothrombin time 25.9 (H) 9.0 - 11.1 sec

## 2020-01-14 NOTE — PROGRESS NOTES
Reason for Admission:   Acute renal failure, hypoglycemia RRAT Score:     32 high risk Resources/supports as identified by patient/family:   Lives at [place] [facility] Top Challenges facing patient (as identified by patient/family and CM): Finances/Medication cost?      No challenges reported Transportation? Pt's son provides transportation, patient will likely need stretcher transportation at [facility] Support system or lack thereof?  family Living arrangements? Has been living at [facility] the past 5 years Self-care/ADLs/Cognition? Staff assist for all ADLs, currently alert to self Current Advanced Directive/Advance Care Plan:  DNR Plan for utilizing home health:    Has not used home health in the past 
              
Transition of Care Plan: 1. Patient in ED bed waiting for inpatient admission 2. Patient will need 2nd IM letter at discharge 3. Patient's family prefer for her to discharge back to [facility] with follow up appointments. Patient is a 80year old female admitted 1/14 for acute renal failure and hypoglycemia. Patient asleep in bed, her 2 sons present in room. Demographic information verified and correct. Insurance information verified and correct. Patient lives at [facility], no oxygen, uses a wheelchair for mobility (has a cane, not in use) and has not used home health in the past.  Patient gets her medications from [facility]. Patient is dependent for all ADLs on [employer]. Patient's son provides transportation Care Management Interventions PCP Verified by CM: Zachary Cornejo MD) Mode of Transport at Discharge: Other (see comment)(pt's son provides transportation) Transition of Care Consult (CM Consult): Discharge Planning Discharge Durable Medical Equipment: No 
 Physical Therapy Consult: No 
Occupational Therapy Consult: No 
Speech Therapy Consult: No 
Current Support Network: Assisted Living(lives at [facility]) Confirm Follow Up Transport: Family Discharge Location Discharge Placement: Assisted Living Gabriel Cespedes RN, BSN [facility] [phone]

## 2020-01-14 NOTE — ED NOTES
TRANSFER - OUT REPORT: 
 
Verbal report given to Yaquelin Suazo  being transferred to [facility] for routine progression of care Report consisted of patients Situation, Background, Assessment and  
Recommendations(SBAR). Information from the following report(s) SBAR, ED Summary, [facility] and Recent Results was reviewed with the receiving nurse. Lines:  
Peripheral IV 01/13/20 Left Antecubital (Active) Site Assessment Clean, dry, & intact 1/13/2020 10:56 PM  
Phlebitis Assessment 0 1/13/2020 10:56 PM  
Infiltration Assessment 0 1/13/2020 10:56 PM  
Dressing Status Clean, dry, & intact 1/13/2020 10:56 PM  
Hub Color/Line Status Flushed 1/13/2020 10:56 PM  
Action Taken Blood drawn 1/13/2020 10:56 PM  
  
 
Opportunity for questions and clarification was provided. Patient transported with: 
 [facility]

## 2020-01-14 NOTE — PROGRESS NOTES
Pharmacy Clarification of the Prior to Admission Medication Regimen Retrospective to the Admission Medication Reconciliation The patient was not interviewed regarding clarification of the prior to admission medication regimen. Patient's 2 sons were present in room and obtained permission from patient to discuss drug regimen with visitor(s) present. Patient was transferred from [address] [place] [address] to [facility], with a current med list.   
 
Information Obtained From: Transfer Papers Recommendations/Findings: The following amendments were made to the patient's active medication list on file at [facility]:  
 
1) Additions:  
warfarin (COUMADIN) 2.5 mg tablet 
albuterol-ipratropium (DUO-NEB) 2.5 mg-0.5 mg/3 ml nebu 
zinc oxide (SECURA EXTRA PROTECTIVE) 30.6 % topical cream 
linaGLIPtin (TRADJENTA) 5 mg tablet 
dulaglutide (TRULICITY) 1.20 ZT/7.5 mL sub-q pen 2) Removals:  
Januvia Warfarin 7.5 mg 
 
3) Changes: 
fluPHENAZine (PROLIXIN) 1 mg tablet (Old regimen: 2 mg QHS /New regimen: 1 mg QHS) 
insulin glargine (LANTUS U-100 INSULIN) 100 unit/mL injection (Old regimen: 22 units QHS /New regimen: 26 units QHS) 
insulin lispro (HUMALOG U-100 INSULIN) 100 unit/mL injection (Old regimen: 5 units /New regimen: 5 units TIDAC) 4) Pertinent Pharmacy Findings: 
Updated patients preferred outpatient pharmacy to: T did not update the outpatient pharmacy due to the patient living at a SNF Identified High Alert Medication Information Current Anticoagulants: 
Name: Warfarin PTA medication list was corrected to the following:  
 
Prior to Admission Medications Prescriptions Last Dose Informant Taking?  
acetaminophen (TYLENOL) 325 mg tablet 1/13/2020 at Unknown time Transfer Papers Yes Sig: Take 650 mg by mouth every six (6) hours as needed for Pain. albuterol-ipratropium (DUO-NEB) 2.5 mg-0.5 mg/3 ml nebu 1/13/2020 at Unknown time Transfer Papers Yes Sig: 3 mL by Nebulization route every four (4) hours as needed for Wheezing. alendronate (FOSAMAX) 70 mg tablet 2020 at Unknown time Transfer Papers Yes Sig: Take 70 mg by mouth every seven (7) days. allopurinol (ZYLOPRIM) 100 mg tablet 2020 at Unknown time Transfer Papers Yes Sig: Take 100 mg by mouth daily. amLODIPine (NORVASC) 10 mg tablet 2020 at Unknown time Transfer Papers Yes Sig: Take 10 mg by mouth daily. benztropine (COGENTIN) 1 mg tablet 2020 at Unknown time Transfer Papers Yes Sig: Take 1 mg by mouth two (2) times a day. carvedilol (COREG) 3.125 mg tablet 2020 at Unknown time Transfer Papers Yes Sig: Take 3.125 mg by mouth two (2) times daily (with meals). dulaglutide (TRULICITY) 0.25 BF/1.8 mL sub-q pen 2020 at Unknown time Transfer Papers Yes Si.75 mg by SubCUTAneous route every seven (7) days. ferrous sulfate (IRON) 325 mg (65 mg iron) EC tablet 2020 at Unknown time Transfer Papers Yes Sig: Take 325 mg by mouth Before breakfast and dinner. fluPHENAZine (PROLIXIN) 1 mg tablet 2020 at Unknown time Transfer Papers Yes Sig: Take 1 mg by mouth daily. fluPHENAZine (PROLIXIN) 1 mg tablet 2020 at Unknown time Transfer Papers Yes Sig: Take 1 mg by mouth nightly. insulin glargine (LANTUS U-100 INSULIN) 100 unit/mL injection 2020 at Unknown time Transfer Papers Yes Si Units by SubCUTAneous route nightly. insulin lispro (HUMALOG U-100 INSULIN) 100 unit/mL injection 2020 at Unknown time Transfer Papers Yes Si Units by SubCUTAneous route three (3) times daily (with meals). linaGLIPtin (TRADJENTA) 5 mg tablet 2020 at Unknown time Transfer Papers Yes Sig: Take 5 mg by mouth daily. memantine ER (NAMENDA XR) 28 mg capsule 2020 at Unknown time Transfer Papers Yes Sig: Take 28 mg by mouth daily. pantoprazole (PROTONIX) 40 mg tablet 2020 at Unknown time Transfer Papers Yes Sig: Take 40 mg by mouth daily. potassium chloride (KAON 10%) 20 mEq/15 mL solution 1/13/2020 at Unknown time Transfer Papers Yes Sig: Take 20 mEq by mouth daily. pravastatin (PRAVACHOL) 20 mg tablet 1/13/2020 at Unknown time Transfer Papers Yes Sig: Take 20 mg by mouth nightly. torsemide (DEMADEX) 100 mg tablet 1/13/2020 at Unknown time Transfer Papers Yes Sig: Take 100 mg by mouth daily. warfarin (COUMADIN) 2.5 mg tablet 1/10/2020 at Unknown time Transfer Papers Yes Sig: Take 2.5 mg by mouth two (2) days a week. Patient takes 5 mg M,W,TH,SAT, SUN and takes 2.5 mg every TUES and FRI  
warfarin (COUMADIN) 5 mg tablet 1/13/2020 at Unknown time Transfer Papers Yes Sig: Take 5 mg by mouth five (5) days a week. Patient takes 5 mg M,W,TH,SAT, SUN and takes 2.5 mg every TUES and FRI  
zinc oxide (SECURA EXTRA PROTECTIVE) 30.6 % topical cream 1/13/2020 at Unknown time Transfer Papers Yes Sig: Apply  to affected area four (4) times daily as needed for Skin Irritation (to right buttock). Facility-Administered Medications: None Thank you, 
Ramesh Watkins Medication History Pharmacy Technician

## 2020-01-14 NOTE — PROGRESS NOTES
Pharmacy Daily Dosing of Warfarin Indication: afib Goal INR: 2-3 PTA Warfarin Dose: 2.5mg Tues/Fri, 5mg M/W/Th/Sat/Blake Concurrent anticoagulants: none Concurrent antiplatelet: none Major Interacting Medications Drugs that may increase INR: allopurinol (increased risk of bleeding, home med) Drugs that may decrease INR: none Conditions that may increase/decrease INR (CHF, C. diff, cirrhosis, thyroid disorder, hypoalbuminemia): hypoalbuminemia Labs: 
Recent Labs  
  01/14/20 
0830 01/14/20 
0442 01/13/20 
2332 01/13/20 
2239 INR 2.9*  --  2.7*  --   
HGB  --  9.6*  --  9.6* PLT  --  244  --  287 SGOT  --   --   --  20  
TBILI  --   --   --  0.2 ALB  --   --   --  2.6* Impression/Plan:  
Will order warfarin 1 mg PO x 1 dose. Gave about half of normal dose for Tuesday due to patient's INR rising without a warfarin dose being given on 1/13. Daily INR 
CBC w/o diff QOD Pharmacy will follow daily and adjust the dose as appropriate. Thanks SMOOTH TejedaD 
 
 
http://[facility]/[place]/[facility]/Pharmacy/Clinical%20Companion/Warfarin%20Dosing%20Protocol. pdf

## 2020-01-14 NOTE — ED NOTES
[phone],  but no answer.

## 2020-01-14 NOTE — ED PROVIDER NOTES
EMERGENCY DEPARTMENT HISTORY AND PHYSICAL EXAM      Date: 1/13/2020  Patient Name: Sid Medrano    History of Presenting Illness     Chief Complaint   Patient presents with    Low Blood Sugar       History Provided By: Patient's Son and EMS    HPI: Sid Medrano, 80 y.o. female presents to the ED with cc of hypoglycemia. Pt is resident at [facility]. Routine labs had been sent this morning and resulted this afternoon at which time it was noted the pt's glucose was in 40's. At the time results were reviewed pt was eating. EMS was called for pt to be sent to the ED for further eval. Glucose with EMS, low 100's. Per sons pt has been in good health and there have been no recent cough or cold symptoms. Pt with no complaint. There are no other complaints, changes, or physical findings at this time. PCP: Perla Reid MD    No current facility-administered medications on file prior to encounter. Current Outpatient Medications on File Prior to Encounter   Medication Sig Dispense Refill    insulin lispro (HUMALOG U-100 INSULIN) 100 unit/mL injection 5 Units by SubCUTAneous route.  alendronate (FOSAMAX) 70 mg tablet Take 70 mg by mouth every seven (7) days.  fluPHENAZine (PROLIXIN) 1 mg tablet Take 1 mg by mouth daily. Patient takes 1 mg QAM and 2 mg QHS      fluPHENAZine (PROLIXIN) 1 mg tablet Take 2 mg by mouth nightly. Patient takes 1 mg QAM and 2 mg QHS      insulin glargine (LANTUS U-100 INSULIN) 100 unit/mL injection 22 Units by SubCUTAneous route nightly.  pantoprazole (PROTONIX) 40 mg tablet Take 40 mg by mouth daily.  torsemide (DEMADEX) 100 mg tablet Take 100 mg by mouth daily.  acetaminophen (TYLENOL) 325 mg tablet Take 650 mg by mouth every six (6) hours as needed for Pain.  allopurinol (ZYLOPRIM) 100 mg tablet Take 100 mg by mouth daily.  amLODIPine (NORVASC) 10 mg tablet Take 10 mg by mouth daily.       warfarin (COUMADIN) 5 mg tablet Take 5 mg by mouth four (4) days a week. Patient takes 5 mg M-Th-F-Blake evenings, and 7.5 mg Tu-W-Sa evenings      carvedilol (COREG) 3.125 mg tablet Take 3.125 mg by mouth two (2) times daily (with meals).  warfarin (COUMADIN) 7.5 mg tablet Take 7.5 mg by mouth three (3) days a week. Patient takes 5 mg M-Th-F-Blake evenings, and 7.5 mg Tu-W-Sa evenings      ferrous sulfate (IRON) 325 mg (65 mg iron) EC tablet Take 325 mg by mouth Before breakfast and dinner.  benztropine (COGENTIN) 1 mg tablet Take 1 mg by mouth two (2) times a day.  SITagliptin (JANUVIA) 50 mg tablet Take 50 mg by mouth daily.  memantine ER (NAMENDA XR) 28 mg capsule Take 28 mg by mouth daily.  potassium chloride (KAON 10%) 20 mEq/15 mL solution Take 20 mEq by mouth daily.  pravastatin (PRAVACHOL) 20 mg tablet Take 20 mg by mouth nightly. Past History     Past Medical History:  Past Medical History:   Diagnosis Date    Alzheimer's disease [facility] [address]     CHF (congestive heart failure) ([facility] [address]     Dementia ([facility] [address]     Diabetes ([facility] [address]     type II    Elevated troponin 12/11/2015    Essential hypertension     Fall as cause of accidental injury in residential institution as place of occurrence 12/11/2015    GERD (gastroesophageal reflux disease)     Heart failure ([facility] [address]     Hyperlipidemia     Hyperlipidemia     Hypoglycemia     Ill-defined condition     embolism (DVT)    Paroxysmal atrial fibrillation (HCC)     Psychiatric disorder     anxiety       Past Surgical History:  Past Surgical History:   Procedure Laterality Date    HX APPENDECTOMY         Family History:  No family history on file. Social History:  Social History     Tobacco Use    Smoking status: Never Smoker    Smokeless tobacco: Never Used   Substance Use Topics    Alcohol use: No    Drug use: No       Allergies:   Allergies   Allergen Reactions    Influenza Virus Vaccine, Specific Hives    Penicillins Unknown (comments)    Sulfacetamide Unknown (comments) Review of Systems   Review of Systems   Unable to perform ROS: Dementia       Physical Exam   Physical Exam  Vitals signs and nursing note reviewed. Constitutional:       General: She is not in acute distress. Appearance: She is well-developed. She is obese. She is not diaphoretic. Comments: Elderly female no acute distress   HENT:      Head: Normocephalic and atraumatic. Mouth/Throat:      Pharynx: No oropharyngeal exudate. Eyes:      Conjunctiva/sclera: Conjunctivae normal.      Pupils: Pupils are equal, round, and reactive to light. Neck:      Musculoskeletal: Normal range of motion and neck supple. Vascular: No JVD. Trachea: No tracheal deviation. Cardiovascular:      Rate and Rhythm: Normal rate and regular rhythm. Heart sounds: Normal heart sounds. No murmur. Pulmonary:      Effort: Pulmonary effort is normal. No respiratory distress. Breath sounds: Normal breath sounds. No stridor. No wheezing or rales. Chest:      Chest wall: No tenderness. Abdominal:      General: There is no distension. Palpations: Abdomen is soft. Tenderness: There is no tenderness. There is no guarding or rebound. Musculoskeletal: Normal range of motion. General: No tenderness. Right lower leg: Edema present. Left lower leg: Edema present. Skin:     General: Skin is warm and dry. Capillary Refill: Capillary refill takes less than 2 seconds. Neurological:      Mental Status: She is alert. Mental status is at baseline. She is disoriented. Cranial Nerves: No cranial nerve deficit.       Comments: No gross motor or sensory deficits    Psychiatric:         Behavior: Behavior normal.         Diagnostic Study Results     Labs -     Recent Results (from the past 12 hour(s))   GLUCOSE, POC    Collection Time: 01/13/20  9:17 PM   Result Value Ref Range    Glucose (POC) 163 (H) 65 - 100 mg/dL    Performed by Gloriajean Opitz RN (traveller)    CBC WITH AUTOMATED DIFF    Collection Time: 01/13/20 10:39 PM   Result Value Ref Range    WBC 7.0 3.6 - 11.0 K/uL    RBC 4.24 3.80 - 5.20 M/uL    HGB 9.6 (L) 11.5 - 16.0 g/dL    HCT 31.5 (L) 35.0 - 47.0 %    MCV 74.3 (L) 80.0 - 99.0 FL    MCH 22.6 (L) 26.0 - 34.0 PG    MCHC 30.5 30.0 - 36.5 g/dL    RDW 18.6 (H) 11.5 - 14.5 %    PLATELET 603 095 - 534 K/uL    MPV 12.1 8.9 - 12.9 FL    NRBC 0.0 0  WBC    ABSOLUTE NRBC 0.00 0.00 - 0.01 K/uL    NEUTROPHILS 66 32 - 75 %    LYMPHOCYTES 21 12 - 49 %    MONOCYTES 9 5 - 13 %    EOSINOPHILS 4 0 - 7 %    BASOPHILS 0 0 - 1 %    IMMATURE GRANULOCYTES 0 0.0 - 0.5 %    ABS. NEUTROPHILS 4.6 1.8 - 8.0 K/UL    ABS. LYMPHOCYTES 1.5 0.8 - 3.5 K/UL    ABS. MONOCYTES 0.6 0.0 - 1.0 K/UL    ABS. EOSINOPHILS 0.3 0.0 - 0.4 K/UL    ABS. BASOPHILS 0.0 0.0 - 0.1 K/UL    ABS. IMM. GRANS. 0.0 0.00 - 0.04 K/UL    DF AUTOMATED     MAGNESIUM    Collection Time: 01/13/20 10:39 PM   Result Value Ref Range    Magnesium 2.2 1.6 - 2.4 mg/dL   PHOSPHORUS    Collection Time: 01/13/20 10:39 PM   Result Value Ref Range    Phosphorus 3.9 2.6 - 4.7 MG/DL   METABOLIC PANEL, COMPREHENSIVE    Collection Time: 01/13/20 10:39 PM   Result Value Ref Range    Sodium 140 136 - 145 mmol/L    Potassium 3.8 3.5 - 5.1 mmol/L    Chloride 100 97 - 108 mmol/L    CO2 33 (H) 21 - 32 mmol/L    Anion gap 7 5 - 15 mmol/L    Glucose 188 (H) 65 - 100 mg/dL     (H) 6 - 20 MG/DL    Creatinine 2.48 (H) 0.55 - 1.02 MG/DL    BUN/Creatinine ratio 44 (H) 12 - 20      GFR est AA 22 (L) >60 ml/min/1.73m2    GFR est non-AA 18 (L) >60 ml/min/1.73m2    Calcium 8.9 8.5 - 10.1 MG/DL    Bilirubin, total 0.2 0.2 - 1.0 MG/DL    ALT (SGPT) 15 12 - 78 U/L    AST (SGOT) 20 15 - 37 U/L    Alk.  phosphatase 90 45 - 117 U/L    Protein, total 7.7 6.4 - 8.2 g/dL    Albumin 2.6 (L) 3.5 - 5.0 g/dL    Globulin 5.1 (H) 2.0 - 4.0 g/dL    A-G Ratio 0.5 (L) 1.1 - 2.2     URINALYSIS W/ REFLEX CULTURE    Collection Time: 01/13/20 11:06 PM   Result Value Ref Range    Color YELLOW/STRAW      Appearance CLEAR CLEAR      Specific gravity 1.012 1.003 - 1.030      pH (UA) 6.0 5.0 - 8.0      Protein NEGATIVE  NEG mg/dL    Glucose NEGATIVE  NEG mg/dL    Ketone NEGATIVE  NEG mg/dL    Bilirubin NEGATIVE  NEG      Blood NEGATIVE  NEG      Urobilinogen 0.2 0.2 - 1.0 EU/dL    Nitrites NEGATIVE  NEG      Leukocyte Esterase NEGATIVE  NEG      WBC 0-4 0 - 4 /hpf    RBC 0-5 0 - 5 /hpf    Epithelial cells FEW FEW /lpf    Bacteria NEGATIVE  NEG /hpf    UA:UC IF INDICATED CULTURE NOT INDICATED BY UA RESULT CNI      Hyaline cast 0-2 0 - 5 /lpf   PTT    Collection Time: 01/13/20 11:32 PM   Result Value Ref Range    aPTT 38.1 (H) 22.1 - 32.0 sec    aPTT, therapeutic range     58.0 - 77.0 SECS   PROTHROMBIN TIME + INR    Collection Time: 01/13/20 11:32 PM   Result Value Ref Range    INR 2.7 (H) 0.9 - 1.1      Prothrombin time 25.9 (H) 9.0 - 11.1 sec       Radiologic Studies -   XR CHEST PORT   Final Result   IMPRESSION:      No acute process. CT Results  (Last 48 hours)    None        CXR Results  (Last 48 hours)               01/14/20 0031  XR CHEST PORT Final result    Impression:  IMPRESSION:       No acute process. Narrative:  EXAM:  XR CHEST PORT       INDICATION: Altered mental status       COMPARISON: 9/16/2019       TECHNIQUE: Portable AP semiupright chest view at 0008 hours       FINDINGS: The cardiomediastinal contours are stable. The pulmonary vasculature   is within normal limits. The lungs and pleural spaces are clear. There is no pneumothorax. The bones and   upper abdomen are stable. Medical Decision Making   I am the first provider for this patient. I reviewed the vital signs, available nursing notes, past medical history, past surgical history, family history and social history. Vital Signs-Reviewed the patient's vital signs.   Patient Vitals for the past 12 hrs:   Temp Pulse Resp BP SpO2   01/14/20 0330  74 16 105/52 96 % 01/14/20 0230  77 15 112/74 96 %   01/14/20 0130   18 107/48 96 %   01/14/20 0100    104/45 96 %   01/14/20 0030    100/51 96 %   01/13/20 2345    105/48 97 %   01/13/20 2330    107/81 95 %   01/13/20 2315    106/88 99 %   01/13/20 2300    105/63 98 %   01/13/20 2245    98/72 96 %   01/13/20 2230    107/60 96 %   01/13/20 2215    109/49 96 %   01/13/20 2200    119/53 97 %   01/13/20 2130    119/69 94 %   01/13/20 2110 97.9 °F (36.6 °C) 65 18 109/80 98 %       Records Reviewed: Nursing Notes, Old Medical Records, Ambulance Run Sheet, Previous Radiology Studies and Previous Laboratory Studies    Provider Notes (Medical Decision Making):   DDx- electrolyte abn, Acute renal failure, UTI, dehydration    ED Course:   Initial assessment performed. The patients presenting problems have been discussed, and they are in agreement with the care plan formulated and outlined with them. I have encouraged them to ask questions as they arise throughout their visit. PT BUN >100, with elevated Cr, will admit, IV fluids started. Pt with no sig Renal hx, last 2 sets of labs through [facility] show rising Bun an Cr    Consult Note:  Case discussed with Dr. Lord Barbour, hospitalist he will see and evaluate pt for admission. Critical Care Time:   CRITICAL CARE NOTE :    IMPENDING DETERIORATION -Metabolic and Renal  ASSOCIATED RISK FACTORS - Metabolic changes and Dehydration  MANAGEMENT- Bedside Assessment and Supervision of Care  INTERPRETATION -  Xrays, ECG, Blood Pressure, Cardiac Output Measures  and Labs  INTERVENTIONS - hemodynamic mngmt  CASE REVIEW - Hospitalist, Nursing and Family  TREATMENT RESPONSE -Stable  PERFORMED BY - Self    NOTES   :  I have spent 40 minutes of critical care time involved in lab review, consultations with specialist, family decision- making, bedside attention and documentation.  During this entire length of time I was immediately available to the patient Deann Child, DO      Disposition:  Admit to medicine    PLAN:  1. Admit      Diagnosis     Clinical Impression:   1. Acute renal failure, unspecified acute renal failure type ([facility] [address]    2. Hypoglycemic episode in patient with diabetes mellitus ([facility] [address]        Attestations:    Rosie Mims, DO    Please note that this dictation was completed with Inside, the computer voice recognition software. Quite often unanticipated grammatical, syntax, homophones, and other interpretive errors are inadvertently transcribed by the computer software. Please disregard these errors. Please excuse any errors that have escaped final proofreading. Thank you.

## 2020-01-14 NOTE — ED NOTES
Dr. Ramírez Huynh returned Tommie sommers, Primary nurse spoke with him about patient's blood sugar dropping to 53. Primary nurse initiated dextrose 5% with 0.9% Normal Saline and provided juice for patient to get her blood sugar back up. Primary nurse will do q15 blood sugar checks until stabilized.

## 2020-01-14 NOTE — PROGRESS NOTES
Pt admitted this AM.  Seen as courtesy. Has R sided upper ext weakness x3 weeks. Was told it was due to medication side effects per family. She is awake, ate well. BG and temp improved. Gen: elderly female in bed, NAD 
CVS: RRR+ Neuro: awake, mildly confused. Strength in RUE 3/5 ? CVA- significant findings of RUE weakness 3/5 strength compared to RUE, will obtain head CT to r/o CVA. Further cva work up pending head CT. PT/OT Hypoglycemia. She sleeps quite a lot, likely missing meals in additional to insulin use. Cont'  d5 gtt, can stop after BG>200 and pt eating well. Hold home insuline. F/u with A1C ordered and adjust insulin regimen prior to discharge Hypothermia, likely due to hypoglycemia. Labs/cxr ordered to r/o infection.

## 2020-01-14 NOTE — PROGRESS NOTES
Received report from Jeannine Reid. Rectal temp 94.4. Bear hugger placed on patient and MD paged.  Orders received for blood cultures and lab work

## 2020-01-14 NOTE — PHYSICIAN ADVISORY
This patient is at high risk of adverse events and deterioration based on documented clinical data, comorbid conditions and current acute care course. Ms. Queta Perea is expected to meet Inpatient Admission status criteria in accordance with CMS regulation Section 43 .3. Specifically, due to medical necessity the patient's stay is expected to exceed Two Midnights. It is our recommendation that this patient's hospitalization status should be  INPATIENT status. The final decision of the patient's hospitalization status depends on the attending physician's judgment.

## 2020-01-14 NOTE — ED NOTES
Care taken over at this time. Report received from offgoing nurse. Pt resting comfortably at this time, VSS, will continue to monitor.

## 2020-01-14 NOTE — ED TRIAGE NOTES
Patient arrived via EMS from [facility]. EMS states patient had a BS of 41. Patient ate dinner before she left facility. FSBS here was 163. Patient is A/Ox1 and at baseline.  Hx of dementia and diabetes

## 2020-01-14 NOTE — PROGRESS NOTES
Lactic not drawn this AM. Lactic drawn and sent down to lab. Bedside shift change report given to Ronna Ayon (oncoming nurse) by Javan Hughes (offgoing nurse). Report included the following information SBAR, Kardex, ED Summary, Procedure Summary, Intake/Output, MAR, Recent Results and Cardiac Rhythm afib.

## 2020-01-14 NOTE — PROGRESS NOTES
01/14/20 0749   Vitals   Temp (!) 94.4 °F (34.7 °C)   Temp Source Rectal   Pulse (Heart Rate) 95   Heart Rate Source Monitor   Resp Rate 20   O2 Sat (%) 98 %   Level of Consciousness Alert   /86   MAP (Calculated) 97   BP 1 Location Right arm   BP 1 Method Automatic   BP Patient Position At rest   Cardiac Rhythm A Fib   MEWS Score 3     Rectal temp 94.4. Bear hugger placed on patient and MD paged.  Orders received for blood cultures and lab work

## 2020-01-15 LAB
ANION GAP SERPL CALC-SCNC: 1 MMOL/L (ref 5–15)
BUN SERPL-MCNC: 66 MG/DL (ref 6–20)
BUN/CREAT SERPL: 40 (ref 12–20)
CALCIUM SERPL-MCNC: 8.7 MG/DL (ref 8.5–10.1)
CHLORIDE SERPL-SCNC: 112 MMOL/L (ref 97–108)
CO2 SERPL-SCNC: 33 MMOL/L (ref 21–32)
CREAT SERPL-MCNC: 1.65 MG/DL (ref 0.55–1.02)
ERYTHROCYTE [DISTWIDTH] IN BLOOD BY AUTOMATED COUNT: 18.1 % (ref 11.5–14.5)
GLUCOSE BLD STRIP.AUTO-MCNC: 139 MG/DL (ref 65–100)
GLUCOSE BLD STRIP.AUTO-MCNC: 141 MG/DL (ref 65–100)
GLUCOSE BLD STRIP.AUTO-MCNC: 142 MG/DL (ref 65–100)
GLUCOSE BLD STRIP.AUTO-MCNC: 148 MG/DL (ref 65–100)
GLUCOSE BLD STRIP.AUTO-MCNC: 182 MG/DL (ref 65–100)
GLUCOSE BLD STRIP.AUTO-MCNC: 191 MG/DL (ref 65–100)
GLUCOSE BLD STRIP.AUTO-MCNC: 229 MG/DL (ref 65–100)
GLUCOSE SERPL-MCNC: 147 MG/DL (ref 65–100)
HCT VFR BLD AUTO: 30.5 % (ref 35–47)
HGB BLD-MCNC: 9.2 G/DL (ref 11.5–16)
INR PPP: 3.1 (ref 0.9–1.1)
MCH RBC QN AUTO: 22.9 PG (ref 26–34)
MCHC RBC AUTO-ENTMCNC: 30.2 G/DL (ref 30–36.5)
MCV RBC AUTO: 75.9 FL (ref 80–99)
NRBC # BLD: 0 K/UL (ref 0–0.01)
NRBC BLD-RTO: 0 PER 100 WBC
PLATELET # BLD AUTO: 233 K/UL (ref 150–400)
PMV BLD AUTO: 11.3 FL (ref 8.9–12.9)
POTASSIUM SERPL-SCNC: 3.4 MMOL/L (ref 3.5–5.1)
PROTHROMBIN TIME: 30.2 SEC (ref 9–11.1)
RBC # BLD AUTO: 4.02 M/UL (ref 3.8–5.2)
SERVICE CMNT-IMP: ABNORMAL
SODIUM SERPL-SCNC: 146 MMOL/L (ref 136–145)
WBC # BLD AUTO: 5 K/UL (ref 3.6–11)

## 2020-01-15 PROCEDURE — 85027 COMPLETE CBC AUTOMATED: CPT

## 2020-01-15 PROCEDURE — 74011250637 HC RX REV CODE- 250/637: Performed by: INTERNAL MEDICINE

## 2020-01-15 PROCEDURE — 82962 GLUCOSE BLOOD TEST: CPT

## 2020-01-15 PROCEDURE — 65660000000 HC RM CCU STEPDOWN

## 2020-01-15 PROCEDURE — 74011000258 HC RX REV CODE- 258: Performed by: INTERNAL MEDICINE

## 2020-01-15 PROCEDURE — 74011636637 HC RX REV CODE- 636/637: Performed by: INTERNAL MEDICINE

## 2020-01-15 PROCEDURE — 97161 PT EVAL LOW COMPLEX 20 MIN: CPT

## 2020-01-15 PROCEDURE — 36415 COLL VENOUS BLD VENIPUNCTURE: CPT

## 2020-01-15 PROCEDURE — 80048 BASIC METABOLIC PNL TOTAL CA: CPT

## 2020-01-15 PROCEDURE — 97165 OT EVAL LOW COMPLEX 30 MIN: CPT

## 2020-01-15 PROCEDURE — 97116 GAIT TRAINING THERAPY: CPT

## 2020-01-15 PROCEDURE — 85610 PROTHROMBIN TIME: CPT

## 2020-01-15 RX ORDER — WARFARIN 1 MG/1
1 TABLET ORAL ONCE
Status: COMPLETED | OUTPATIENT
Start: 2020-01-15 | End: 2020-01-15

## 2020-01-15 RX ADMIN — DEXTROSE MONOHYDRATE AND SODIUM CHLORIDE 75 ML/HR: 5; .9 INJECTION, SOLUTION INTRAVENOUS at 08:17

## 2020-01-15 RX ADMIN — WARFARIN SODIUM 1 MG: 1 TABLET ORAL at 18:45

## 2020-01-15 RX ADMIN — CARVEDILOL 3.12 MG: 3.12 TABLET, FILM COATED ORAL at 08:22

## 2020-01-15 RX ADMIN — PANTOPRAZOLE SODIUM 40 MG: 40 TABLET, DELAYED RELEASE ORAL at 10:16

## 2020-01-15 RX ADMIN — MEMANTINE HYDROCHLORIDE 10 MG: 10 TABLET ORAL at 18:45

## 2020-01-15 RX ADMIN — CARVEDILOL 3.12 MG: 3.12 TABLET, FILM COATED ORAL at 18:45

## 2020-01-15 RX ADMIN — BENZTROPINE MESYLATE 1 MG: 1 TABLET ORAL at 10:16

## 2020-01-15 RX ADMIN — INSULIN LISPRO 2 UNITS: 100 INJECTION, SOLUTION INTRAVENOUS; SUBCUTANEOUS at 12:17

## 2020-01-15 RX ADMIN — BENZTROPINE MESYLATE 1 MG: 1 TABLET ORAL at 18:45

## 2020-01-15 RX ADMIN — PRAVASTATIN SODIUM 20 MG: 10 TABLET ORAL at 21:49

## 2020-01-15 RX ADMIN — Medication 10 ML: at 06:00

## 2020-01-15 RX ADMIN — AMLODIPINE BESYLATE 10 MG: 5 TABLET ORAL at 10:16

## 2020-01-15 RX ADMIN — Medication 10 ML: at 21:49

## 2020-01-15 RX ADMIN — FERROUS SULFATE TAB 325 MG (65 MG ELEMENTAL FE) 325 MG: 325 (65 FE) TAB at 18:45

## 2020-01-15 RX ADMIN — FERROUS SULFATE TAB 325 MG (65 MG ELEMENTAL FE) 325 MG: 325 (65 FE) TAB at 08:22

## 2020-01-15 RX ADMIN — MEMANTINE HYDROCHLORIDE 10 MG: 10 TABLET ORAL at 10:16

## 2020-01-15 NOTE — PROGRESS NOTES
Problem: Mobility Impaired (Adult and Pediatric) Goal: *Acute Goals and Plan of Care (Insert Text) Description FUNCTIONAL STATUS PRIOR TO ADMISSION: Pt currently at [facility], participating with PT/OT following hip fx repair in Sept 2019. Previously ambulating with use of SPC however states she has been ambulating with RW and x1 person assist. Pt oriented to self only this date therefore unsure of accuracy of report. HOME SUPPORT PRIOR TO ADMISSION: The patient living at [facility], participating with PT/OT. Has assist of staff. Previously lived at [facility] facility. Physical Therapy Goals Initiated 1/15/2020 1. Patient will move from supine to sit and sit to supine , scoot up and down and roll side to side in bed with supervision/set-up within 7 day(s). 2.  Patient will transfer from bed to chair and chair to bed with supervision/set-up using the least restrictive device within 7 day(s). 3.  Patient will perform sit to stand with supervision/set-up within 7 day(s). 4.  Patient will ambulate with supervision/set-up for 25 feet with the least restrictive device within 7 day(s). 1/15/2020 1243 by Cortez Cortes, LE 
PHYSICAL THERAPY EVALUATION Patient: Gabbi Gamble (15 y.o. female) Date: 1/15/2020 Primary Diagnosis: Hypoglycemia [E16.2] Precautions:     
 
 
ASSESSMENT Based on the objective data described below, the patient presents with increased confusion/baseline dementia, decreased endurance/activity tolerance, generalized weakness, impaired balance, and overall impaired functional mobility. Pt oriented to self only this date however agreeable to participation with therapy. Pt assumed standing position w/ CGAx1, requiring facilitation of forward weight shift. Once standing, pt ambulated w/ minAx1 and RW support, exhibiting slow, shuffled gait.  Overall, pt tolerated therapy session well however should continue to have x1 person assist for all OOB mobility/ambulation given functional mobility deficits in addition to increased confusion. Current Level of Function Impacting Discharge (mobility/balance): supervision for bed mobility, CGA for sit<>Stand transfer, [place] for ambulation w/ RW support Functional Outcome Measure: The patient scored 25/100 on the Barthel Index outcome measure which is indicative of significant impairment in ADLs and functional mobility. Other factors to consider for discharge: baseline dementia, currently at [facility] Patient will benefit from skilled therapy intervention to address the above noted impairments. PLAN : 
Recommendations and Planned Interventions: bed mobility training, transfer training, gait training, therapeutic exercises, patient and family training/education, and therapeutic activities Frequency/Duration: Patient will be followed by physical therapy:  4 times a week to address goals. Recommendation for discharge: (in order for the patient to meet his/her long term goals) Therapy up to 5 days/week in SNF setting This discharge recommendation: 
Has been made in collaboration with the attending provider and/or case management IF patient discharges home will need the following DME: to be determined (TBD) SUBJECTIVE:  
Patient stated I'm from [place], [place].  OBJECTIVE DATA SUMMARY:  
HISTORY:   
Past Medical History:  
Diagnosis Date Alzheimer's disease ([address] CHF (congestive heart failure) ([address] Dementia ([address] Diabetes ([address] type II Elevated troponin 12/11/2015 Essential hypertension Fall as cause of accidental injury in residential institution as place of occurrence 12/11/2015 GERD (gastroesophageal reflux disease) Heart failure ([address] Hyperlipidemia Hyperlipidemia Hypoglycemia Ill-defined condition   
 embolism (DVT) Paroxysmal atrial fibrillation (HCC) Psychiatric disorder   
 anxiety Past Surgical History:  
Procedure Laterality Date HX APPENDECTOMY Personal factors and/or comorbidities impacting plan of care:  
 
Home Situation Home Environment: Skilled nursing facility Care Facility Name: [facility] # Steps to Enter: 0 One/Two Story Residence: One story Living Alone: No 
Support Systems: Skilled nursing facility Patient Expects to be Discharged to[de-identified] Skilled nursing facility Current DME Used/Available at Home: Cane, straight, Walker, rolling Tub or Shower Type: Shower EXAMINATION/PRESENTATION/DECISION MAKING:  
Critical Behavior: 
Neurologic State: Alert Orientation Level: Oriented to person, Disoriented to place, Disoriented to situation, Disoriented to time Cognition: Follows commands Safety/Judgement: Fall prevention Hearing: Auditory Auditory Impairment: None Skin:  intact Edema: none noted Range Of Motion: 
AROM: Generally decreased, functional 
  
  
  
  
  
  
  
Strength:   
Strength: Generally decreased, functional 
  
  
  
  
  
  
Tone & Sensation:  
Tone: Normal 
  
  
  
  
  
  
  
  
   
Coordination: 
Coordination: Within functional limits Vision:  
Acuity: Impaired far vision;Able to read clock/calendar on wall without difficulty Corrective Lenses: Glasses(not currently with Pt) Functional Mobility: 
Bed Mobility: 
Rolling: Supervision Supine to Sit: Supervision Scooting: Supervision Transfers: 
Sit to Stand: Contact guard assistance Stand to Sit: Contact guard assistance Bed to Chair: Minimum assistance Balance:  
Sitting: Intact Standing: Impaired; With support(RW) 
Standing - Static: Fair;Constant support Standing - Dynamic : Fair;Constant support Ambulation/Gait Training: 
Distance (ft): 3 Feet (ft) Assistive Device: Walker, rolling;Gait belt Ambulation - Level of Assistance: Minimal assistance Gait Abnormalities: Shuffling gait;Trunk sway increased;Decreased step clearance Base of Support: Widened Speed/Yandy: Shuffled; Slow Step Length: Left shortened;Right shortened Functional Measure: 
Barthel Index: 
 
Bathin Bladder: 0 Bowels: 0 Groomin Dressin Feeding: 10 Mobility: 0 Stairs: 0 Toilet Use: 5 Transfer (Bed to Chair and Back): 5 Total: 25/100 The Barthel ADL Index: Guidelines 1. The index should be used as a record of what a patient does, not as a record of what a patient could do. 2. The main aim is to establish degree of independence from any help, physical or verbal, however minor and for whatever reason. 3. The need for supervision renders the patient not independent. 4. A patient's performance should be established using the best available evidence. Asking the patient, friends/relatives and nurses are the usual sources, but direct observation and common sense are also important. However direct testing is not needed. 5. Usually the patient's performance over the preceding 24-48 hours is important, but occasionally longer periods will be relevant. 6. Middle categories imply that the patient supplies over 50 per cent of the effort. 7. Use of aids to be independent is allowed. Jennifer Alcazar., Barthel, D.W. (8798). Functional evaluation: the Barthel Index. [address] [facility] (14)2. [facility] DAO Wolfe, Hyacinth Wright, Yasmin [place].[facility], [address] [place] [address] (). Measuring the change indisability after inpatient rehabilitation; comparison of the responsiveness of the Barthel Index and Functional [place] Measure. Journal of Neurology, Neurosurgery, and Psychiatry, 66(4), 796-775. SLADE Castrejon.THANG.DONN, YESSENIA Hassan, & Nevaeh Marie MJACKLYN. (2004.) Assessment of post-stroke quality of life in cost-effectiveness studies: The usefulness of the Barthel Index and the EuroQoL-5D. [facility], 13, 102-90 Physical Therapy Evaluation Charge Determination History Examination Presentation Decision-Making MEDIUM  Complexity : 1-2 comorbidities / personal factors will impact the outcome/ POC  MEDIUM Complexity : 3 Standardized tests and measures addressing body structure, function, activity limitation and / or participation in recreation  MEDIUM Complexity : Evolving with changing characteristics  MEDIUM Complexity : FOTO score of 26-74 Based on the above components, the patient evaluation is determined to be of the following complexity level: MEDIUM Pain Rating: 
Denied complaints of pain Activity Tolerance:  
Good Please refer to the flowsheet for vital signs taken during this treatment. After treatment patient left in no apparent distress:  
Sitting in chair and Call bell within reach COMMUNICATION/EDUCATION:  
The patients plan of care was discussed with: Occupational Therapist and Registered Nurse. Fall prevention education was provided and the patient/caregiver indicated understanding., Patient/family have participated as able in goal setting and plan of care. , and Patient/family agree to work toward stated goals and plan of care. Thank you for this referral. 
Isma Mantilla, PT, DPT Time Calculation: 17 mins

## 2020-01-15 NOTE — PROGRESS NOTES
Spiritual Care Partner Volunteer visited patient in Neuro on January 15, 2020. Documented by: 
  
HAILY Sandoval, [facility], Staff  [facility]  Paging Service  [phone])

## 2020-01-15 NOTE — PROGRESS NOTES
* No surgery found * 
* No surgery found * Bedside and Verbal shift change report given to [employer] Lynd (oncoming nurse) by Deloris Caldwell (offgoing nurse). Report included the following information SBAR, Kardex, Recent Results, Med Rec Status and Cardiac Rhythm [facility] Phone:   [phone] Significant changes during shift:  Admission, blood sugar was 108 Patient Information Josey Sears 
80 y.o. 
1/13/2020  9:05 PM by Tameka Lebron MD. Josey Sears was admitted from SNF LTC Problem List 
 
Patient Active Problem List  
 Diagnosis Date Noted  Hypoglycemia 01/14/2020  Tachy-kate syndrome ([address] 09/18/2019  
 SSS (sick sinus syndrome) ([address] 09/17/2019  Closed fracture of right hip ([address] 09/16/2019  Alzheimer's disease ([address]  CHF (congestive heart failure) ([address]  Bradycardia 12/11/2015  Fall as cause of accidental injury in residential institution as place of occurrence 12/11/2015  Elevated troponin 12/11/2015  Hyperlipidemia  Essential hypertension  Dementia ([address]  Diabetes ([address]  Paroxysmal atrial fibrillation (HCC)  Short of breath on exertion 08/07/2014 Past Medical History:  
Diagnosis Date  Alzheimer's disease ([address]  CHF (congestive heart failure) ([address]  Dementia ([address]  Diabetes ([address] type II  
 Elevated troponin 12/11/2015  Essential hypertension  Fall as cause of accidental injury in residential institution as place of occurrence 12/11/2015  GERD (gastroesophageal reflux disease)  Heart failure ([address]  Hyperlipidemia  Hyperlipidemia  Hypoglycemia  Ill-defined condition   
 embolism (DVT)  Paroxysmal atrial fibrillation (HCC)  Psychiatric disorder   
 anxiety Core Measures: CVA: No No 
CHF:Yes No 
PNA:No No 
 
Post Op Surgical (If Applicable):  
 
Number times ambulated in hallway past shift:  0 Number of times OOB to chair past shift:   0 
NG Tube: No 
 Incentive Spirometer: No 
Drains: No   Volume  0 Dressing Present:  No 
Flatus:  Not applicable Activity Status: OOB to Chair No 
Ambulated this shift No  
Bed Rest No 
 
Supplemental O2: (If Applicable) NC No 
NRB No 
Venti-mask No 
On 0 Liters/min LINES AND DRAINS: 
 
Central Line? No  
PICC LINE? No  
Urinary Catheter? No  
DVT prophylaxis: DVT prophylaxis Med- Yes DVT prophylaxis SCD or CORAL- No  
 
Wounds: (If Applicable) Wounds-  redness Location Sacral 
 
Patient Safety: 
 
Falls Score Total Score: 5 Safety Level_______ Bed Alarm On? Yes Sitter? Not applicable Plan for upcoming shift: safety, fs every 2 hours Discharge Plan: Yes probably back to [place] Active Consults: 
IP CONSULT TO HOSPITALIST

## 2020-01-15 NOTE — PROGRESS NOTES
Hospitalist Progress Note NAME: Teresa Jerez :  1927 MRN:  [id number] Assessment / Plan: Hypoglycemia DM 2 Stop D10 drip and monitor BS Stopped PO meds SSI 
  
Chronic Atrial fibrillation Continue Coumadin 
  
Chronic Kidney Disease Renal function close to baseline 
  
Dementia Continue home medication 
  
History of chronic combined systolic and diastolic heart failure Continue torsemide  
   
Code Status: DNR Surrogate Decision Maker: Joey Isidro 
  
DVT Prophylaxis: coumadin GI Prophylaxis: not indicated Subjective: Pt seen and examined at bedside. NAD. Overnight events d/w/ RN  
 
CHIEF COMPLAINT: f/u \"hypoglycemia\" Review of Systems: 
Symptom Y/N Comments  Symptom Y/N Comments Fever/Chills    Chest Pain Poor Appetite    Edema Cough    Abdominal Pain Sputum    Joint Pain SOB/BUTTS    Pruritis/Rash Nausea/vomit    Tolerating PT/OT Diarrhea    Tolerating Diet Constipation    Other Could NOT obtain due to: dementia Objective: VITALS:  
Last 24hrs VS reviewed since prior progress note. Most recent are: 
Patient Vitals for the past 24 hrs: 
 Temp Pulse Resp BP SpO2  
01/15/20 1156 98 °F (36.7 °C) 84 18 119/63 98 % 01/15/20 0718 98.2 °F (36.8 °C) (!) 105 18 118/66 97 % 01/15/20 0506 98.1 °F (36.7 °C) 81 16 126/62 96 % 20 2327 97.7 °F (36.5 °C) 80 18 126/51 94 % 20 1806 98 °F (36.7 °C) 94 18 119/68 99 % Intake/Output Summary (Last 24 hours) at 1/15/2020 1533 Last data filed at 1/15/2020 5275 Gross per 24 hour Intake  Output 1000 ml Net -1000 ml PHYSICAL EXAM: 
General: WD, WN. Alert, cooperative, no acute distress   
EENT:  EOMI. Anicteric sclerae. MMM Resp:  CTA bilaterally, no wheezing or rales. No accessory muscle use CV:  Regular  rhythm,  No edema GI:  Soft, Non distended, Non tender.  +Bowel sounds Neurologic:  Alert, normal speech, Psych:   Not anxious nor agitated Skin:  No rashes. No jaundice Reviewed most current lab test results and cultures  YES Reviewed most current radiology test results   YES Review and summation of old records today    NO Reviewed patient's current orders and MAR    YES 
PMH/SH reviewed - no change compared to H&P 
________________________________________________________________________ Care Plan discussed with: 
  Comments Patient y Family  y At bedside RN y   
Care Manager Consultant Multidiciplinary team rounds were held today with , nursing, pharmacist and clinical coordinator. Patient's plan of care was discussed; medications were reviewed and discharge planning was addressed. ________________________________________________________________________ Total NON critical care TIME:  35  Minutes Total CRITICAL CARE TIME Spent:   Minutes non procedure based Comments >50% of visit spent in counseling and coordination of care    
________________________________________________________________________ Adriana Flores MD  
 
Procedures: see electronic medical records for all procedures/Xrays and details which were not copied into this note but were reviewed prior to creation of Plan. LABS: 
I reviewed today's most current labs and imaging studies. Pertinent labs include: 
Recent Labs  
  01/15/20 
0443 01/14/20 
0442 01/13/20 
2239 WBC 5.0 5.3 7.0 HGB 9.2* 9.6* 9.6* HCT 30.5* 30.8* 31.5*  244 287 Recent Labs  
  01/15/20 
0443 01/14/20 
0830 01/14/20 
0442 01/13/20 2332 01/13/20 2239 *  --  146*  --  140  
K 3.4*  --  3.3*  --  3.8 *  --  107  --  100 CO2 33*  --  35*  --  33* *  --  79  --  188* BUN 66*  --  98*  --  108* CREA 1.65*  --  2.20*  --  2.48* CA 8.7  --  9.2  --  8.9 MG  --   --   --   --  2.2 PHOS  --   --   --   --  3.9 ALB  --   --   --   --  2.6* TBILI  --   --   --   --  0.2 SGOT  --   --   --   --  20 ALT  --   --   --   --  15 INR 3.1* 2.9*  --  2.7*  --   
 
 
Signed: Jarod Pierson MD

## 2020-01-15 NOTE — PROGRESS NOTES
JAMES Plan: * Return to [address] [place] [address] (LTC) > 2nd IM before d/c 
> CM will arrange medical transport for d/c Initial note: CM reviewed pt's chart and noted updates before moving forward with d/c planning. CM met with pt and daughter at bedside to check in, introduce role, and discuss JAMES plans. Pt's daughter confirmed pt is a LTC resident at [address] [place] [address]; family would like pt to return to facility at d/c. CM sent referral via [place] to [address] [place] [address] in effort for facility to note updates. CM will continue to follow patient for discharge planning needs and arrange for services as deemed necessary. Karolina Salas, MSW Care Manager, [address] [address] 
[phone]

## 2020-01-15 NOTE — PROGRESS NOTES
Bedside and Verbal shift change report given to Linda Brooks RN (oncoming nurse) by Daniele Burns RN (offgoing nurse). Report included the following information SBAR, Kardex, Recent Results, Med Rec Status and Cardiac Rhythm a fib. Zone Phone:   [phone] Significant changes during shift:  Admission, blood sugar was 108 Patient Information Anum Rivera 
80 y.o. 
1/13/2020  9:05 PM by Natividad Callaway MD. Anum Rivera was admitted from [facility] LTC Problem List 
 
Patient Active Problem List  
 Diagnosis Date Noted  Hypoglycemia 01/14/2020  Tachy-kate syndrome ([address] 09/18/2019  
 SSS (sick sinus syndrome) ([address] 09/17/2019  Closed fracture of right hip ([address] 09/16/2019  Alzheimer's disease ([address]  CHF (congestive heart failure) ([address]  Bradycardia 12/11/2015  Fall as cause of accidental injury in residential institution as place of occurrence 12/11/2015  Elevated troponin 12/11/2015  Hyperlipidemia  Essential hypertension  Dementia ([address]  Diabetes ([address]  Paroxysmal atrial fibrillation (HCC)  Short of breath on exertion 08/07/2014 Past Medical History:  
Diagnosis Date  Alzheimer's disease ([address]  CHF (congestive heart failure) ([address]  Dementia ([address]  Diabetes ([address] type II  
 Elevated troponin 12/11/2015  Essential hypertension  Fall as cause of accidental injury in residential institution as place of occurrence 12/11/2015  GERD (gastroesophageal reflux disease)  Heart failure ([address]  Hyperlipidemia  Hyperlipidemia  Hypoglycemia  Ill-defined condition   
 embolism (DVT)  Paroxysmal atrial fibrillation (HCC)  Psychiatric disorder   
 anxiety Core Measures: CVA: No No 
CHF:Yes No 
PNA:No No 
 
Post Op Surgical (If Applicable):  
 
Number times ambulated in hallway past shift:  0 Number of times OOB to chair past shift:   0 
NG Tube: No 
Incentive Spirometer: No 
Drains: No   Volume  0 
 Dressing Present:  No 
Flatus:  Not applicable Activity Status: OOB to Chair No 
Ambulated this shift No  
Bed Rest No 
 
Supplemental O2: (If Applicable) NC No 
NRB No 
Venti-mask No 
On 0 Liters/min LINES AND DRAINS: 
 
Central Line? No  
PICC LINE? No  
Urinary Catheter? No  
DVT prophylaxis: DVT prophylaxis Med- Yes DVT prophylaxis SCD or CORAL- No  
 
Wounds: (If Applicable) Wounds-  redness Location Sacral 
 
Patient Safety: 
 
Falls Score Total Score: 5 Safety Level_______ Bed Alarm On? Yes Sitter? Not applicable Plan for upcoming shift: safety, fs every 2 hours Discharge Plan: Yes probably back to [place] Active Consults: 
IP CONSULT TO HOSPITALIST

## 2020-01-15 NOTE — PROGRESS NOTES
TRANSFER - IN REPORT: 
 
Verbal report received from [facility]) on [address]  being received from ER(unit) for routine progression of care Report consisted of patients Situation, Background, Assessment and  
Recommendations(SBAR). Information from the following report(s) SBAR, Kardex, Recent Results, Med Rec Status and Cardiac Rhythm a fib was reviewed with the receiving nurse. Opportunity for questions and clarification was provided. Assessment completed upon patients arrival to unit and care assumed.

## 2020-01-15 NOTE — PROGRESS NOTES
Problem: Falls - Risk of 
Goal: *Absence of Falls Description Document Kristine Oliva Fall Risk and appropriate interventions in the flowsheet. Outcome: Progressing Towards Goal 
Note: Fall Risk Interventions: 
Mobility Interventions: Bed/chair exit alarm Mentation Interventions: Door open when patient unattended Medication Interventions: Bed/chair exit alarm Elimination Interventions: Call light in reach History of Falls Interventions: Bed/chair exit alarm, Door open when patient unattended, Room close to nurse's station Problem: Pressure Injury - Risk of 
Goal: *Prevention of pressure injury Description Document Arnulfo Scale and appropriate interventions in the flowsheet. Outcome: Progressing Towards Goal 
Note: Pressure Injury Interventions: 
Sensory Interventions: Assess changes in LOC Moisture Interventions: Absorbent underpads Activity Interventions: Increase time out of bed Mobility Interventions: Assess need for specialty bed Nutrition Interventions: Document food/fluid/supplement intake Friction and Shear Interventions: Lift sheet Problem: Diabetes Self-Management Goal: *Disease process and treatment process Description Define diabetes and identify own type of diabetes; list 3 options for treating diabetes. Outcome: Progressing Towards Goal 
Goal: *Incorporating nutritional management into lifestyle Description Describe effect of type, amount and timing of food on blood glucose; list 3 methods for planning meals. Outcome: Progressing Towards Goal 
Goal: *Incorporating physical activity into lifestyle Description State effect of exercise on blood glucose levels. Outcome: Progressing Towards Goal 
Goal: *Developing strategies to promote health/change behavior Description Define the ABC's of diabetes; identify appropriate screenings, schedule and personal plan for screenings. Outcome: Progressing Towards Goal 
Goal: *Using medications safely Description State effect of diabetes medications on diabetes; name diabetes medication taking, action and side effects. Outcome: Progressing Towards Goal 
Goal: *Monitoring blood glucose, interpreting and using results Description Identify recommended blood glucose targets  and personal targets. Outcome: Progressing Towards Goal 
Goal: *Prevention, detection, treatment of acute complications Description List symptoms of hyper- and hypoglycemia; describe how to treat low blood sugar and actions for lowering  high blood glucose level. Outcome: Progressing Towards Goal 
Goal: *Prevention, detection and treatment of chronic complications Description Define the natural course of diabetes and describe the relationship of blood glucose levels to long term complications of diabetes. Outcome: Progressing Towards Goal 
Goal: *Developing strategies to address psychosocial issues Description Describe feelings about living with diabetes; identify support needed and support network Outcome: Progressing Towards Goal 
Goal: *Insulin pump training Outcome: Progressing Towards Goal 
Goal: *Sick day guidelines Outcome: Progressing Towards Goal 
Goal: *Patient Specific Goal (EDIT GOAL, INSERT TEXT) Outcome: Progressing Towards Goal 
  
Problem: Patient Education: Go to Patient Education Activity Goal: Patient/Family Education Outcome: Progressing Towards Goal

## 2020-01-15 NOTE — PROGRESS NOTES
Pharmacy Daily Dosing of Warfarin Indication: afib Goal INR: 2-3 PTA Warfarin Dose: 2.5mg Tues/Fri, 5mg M/W/Th/Sat/Blake Concurrent anticoagulants: none Concurrent antiplatelet: none Major Interacting Medications Drugs that may increase INR: allopurinol (increased risk of bleeding, home med) Drugs that may decrease INR: none Conditions that may increase/decrease INR (CHF, C. diff, cirrhosis, thyroid disorder, hypoalbuminemia): hypoalbuminemia Labs: 
Recent Labs  
  01/15/20 
0443 01/14/20 
0830 01/14/20 0442 01/13/20 
2332 01/13/20 
2239 INR 3.1* 2.9*  --  2.7*  --   
HGB 9.2*  --  9.6*  --  9.6*   --  244  --  287 SGOT  --   --   --   --  20  
TBILI  --   --   --   --  0.2 ALB  --   --   --   --  2.6* Impression/Plan:  
Will order warfarin 1 mg PO x 1 dose. Gave about half of normal dose for Tuesday due to patient's INR rising without a warfarin dose being given on 1/13. Daily INR 
CBC w/o diff QOD Pharmacy will follow daily and adjust the dose as appropriate. Thanks SMOOTH RocaD 
 
 
[email or web address]/Clinical%20Companion/Warfarin%20Dosing%20Protocol. pdf

## 2020-01-15 NOTE — PROGRESS NOTES
Problem: Self Care Deficits Care Plan (Adult) Goal: *Acute Goals and Plan of Care (Insert Text) Description FUNCTIONAL STATUS PRIOR TO ADMISSION: Reports she was receiving therapy at [address] [place] [address]. Prior to that, Pt reports she resided at an [facility]. Per Pt, she requires assist with toilet transfers, LB ADLs and bathing. HOME SUPPORT: LTC resident at [address] [place] [address]. Required assist from staff with basic ADLs/mobility. Occupational Therapy Goals Initiated 1/15/2020 1. Patient will perform container management during meals with modified independence using adaptive strategies/AE PRN within 7 day(s). 2.  Patient will perform grooming tasks seated with Setup Assist within 7 day(s). 3.  Patient will perform upper body dressing with Setup Assist using adaptive strategies/AE PRN within 7 day(s). 4.  Patient will perform lower body dressing with Min A using adaptive strategies/AE PRN within 7 day(s). 5.  Patient will perform toilet transfers with Supervision within 7 day(s). 6.  Patient will perform clothing management during toileting with Min A within 7 day(s). Outcome: Progressing Towards Goal 
 OCCUPATIONAL THERAPY EVALUATION Patient: Becca Edward (2 y.o. female) Date: 1/15/2020 Primary Diagnosis: Hypoglycemia [E16.2] Precautions: Fall Risk ASSESSMENT Based on the objective data described below, the patient presents with confusion (A&Ox1), decreased dynamic standing balance, poor standing tolerance, difficulty reaching distal aspects of her LB during ADLs, and likely baseline decreased AROM/dexterity at all digits on R hand. Pt with recent R femur ORIF on 9/7/2020 at this facility. Overall Pt mobilized well with CGA using RW with additional time, yet was only able to take a few steps from bed<>chair this session.  Pt with urinary incontinence and required Max A to don brief given how taxing it was for her to stand at RW. Educated Pt regarding importance of requesting RN assist with toileting needs. Per prior OT note at this facility, Pt was mobilizing with [facility] and RW prior to September 2019 admission. Recommend Pt return to SNF rehab to maximize her safety and independence with performing her dynamic ADL routine using least restrictive device. Current Level of Function Impacting Discharge (ADLs/self-care): Min to Max A with ADLs Functional Outcome Measure: The patient scored Total: 25/100 on the Barthel Index outcome measure which is indicative of 75% impaired ability to care for basic self needs/dependency on others; inferred 75% dependency on others for instrumental ADLs. Other factors to consider for discharge: Baseline dementia; LTC resident at [facility] Patient will benefit from skilled therapy intervention to address the above noted impairments. PLAN : 
Recommendations and Planned Interventions: self care training, functional mobility training, balance training, therapeutic activities, endurance activities, patient education, and home safety training Frequency/Duration: Patient will be followed by occupational therapy 4 times a week to address goals. Recommendation for discharge: (in order for the patient to meet his/her long term goals) Therapy up to 5 days/week in SNF setting This discharge recommendation: 
Has not yet been discussed the attending provider and/or case management IF patient discharges home will need the following DME: Would benefit from trial of long handled AE to maximize indep w/ LB ADLs and AE for container management during self-feeding. SUBJECTIVE:  
Patient stated Erskin Grumbles you\" OBJECTIVE DATA SUMMARY:  
HISTORY:  
Past Medical History:  
Diagnosis Date  Alzheimer's disease ([facility]  CHF (congestive heart failure) ([facility]  [facility]  Diabetes ([facility] type II  
 Elevated troponin 12/11/2015  Essential hypertension  Fall as cause of accidental injury in residential institution as place of occurrence 12/11/2015  GERD (gastroesophageal reflux disease)  Heart failure ([facility]  Hyperlipidemia  Hyperlipidemia  Hypoglycemia  Ill-defined condition   
 embolism (DVT)  Paroxysmal atrial fibrillation (HCC)  Psychiatric disorder   
 anxiety Past Surgical History:  
Procedure Laterality Date  HX APPENDECTOMY Expanded or extensive additional review of patient history: Dementia, HTN, and CHF Home Situation Home Environment: Skilled nursing facility Care Facility Name: [facility] # Steps to Enter: 0 One/Two Story Residence: One story Living Alone: No 
Support Systems: Skilled nursing facility Patient Expects to be Discharged to[de-identified] Skilled nursing facility Current DME Used/Available at Home: Cane, straight, Walker, rolling Tub or Shower Type: Shower Hand dominance: Right (yet has had to rely on L hand given decreased AROM in all digits on R hand) EXAMINATION OF PERFORMANCE DEFICITS: 
Cognitive/Behavioral Status: 
Neurologic State: Alert Orientation Level: Oriented to person;Disoriented to place; Disoriented to situation;Disoriented to time Cognition: Follows commands Perception: Cues to maintain midline in standing Perseveration: No perseveration noted Safety/Judgement: Fall prevention Skin: Intact Edema: None Hearing: Auditory Auditory Impairment: None Vision/Perceptual:   
    
    
    
  
    
Acuity: Impaired far vision;Able to read clock/calendar on wall without difficulty Corrective Lenses: Glasses(not currently with Pt) Range of Motion: 
AROM: Generally decreased, functional(decreased bilat shoulder flexion; impaired R digit ext) Strength: 
Strength: Generally decreased, functional(weak R  strength) Coordination: 
Coordination: Within functional limits Fine Motor Skills-Upper: Left Intact; Right Impaired Gross Motor Skills-Upper: Left Impaired;Right Impaired Tone & Sensation: 
Tone: Normal 
  
  
 
Balance: 
Sitting: Intact Standing: Impaired; With support(RW) 
Standing - Static: Fair;Constant support Standing - Dynamic : Fair;Constant support Functional Mobility and Transfers for ADLs: 
Bed Mobility: 
Rolling: Supervision Supine to Sit: Supervision Scooting: Supervision Transfers: 
Sit to Stand: Contact guard assistance Stand to Sit: Contact guard assistance Bed to Chair: Minimum assistance Bathroom Mobility: Minimum assistance Toilet Transfer : Minimum assistance Shower Transfer: Minimum assistance Assistive Device : Walker, rolling ADL Assessment: 
Feeding: Minimum assistance(assist w/ containers PRN d/t decreased R grasp) Oral Facial Hygiene/Grooming: Minimum assistance(d/t decreased R grasp) Bathing: Maximum assistance(assist with distal LB aspects) Upper Body Dressing: Moderate assistance(d/t decreased bilat shoulder AROM) Lower Body Dressing: Maximum assistance(distal aspects d/t dec trunk flexion and bilat hip/knee AROM) Toileting: Maximum assistance(needs assist w/ clothing management) ADL Intervention and task modifications: 
  
Lower Body Dressing Assistance Protective [place]: Maximum assistance Socks: Maximum assistance Leg Crossed Method Used: No(unable to cross legs or complete tailor sit) Position Performed: Seated edge of bed;Bending forward method Cognitive Retraining Safety/Judgement: Fall prevention Functional Measure: 
Barthel Index: 
 
Bathin Bladder: 0 Bowels: 0 Groomin Dressin Feeding: 10 Mobility: 0 Stairs: 0 Toilet Use: 5 Transfer (Bed to Chair and Back): 5 Total: 25/100 The Barthel ADL Index: Guidelines 1. The index should be used as a record of what a patient does, not as a record of what a patient could do.  
2. The main aim is to establish degree of independence from any help, physical or verbal, however minor and for whatever reason. 3. The need for supervision renders the patient not independent. 4. A patient's performance should be established using the best available evidence. Asking the patient, friends/relatives and nurses are the usual sources, but direct observation and common sense are also important. However direct testing is not needed. 5. Usually the patient's performance over the preceding 24-48 hours is important, but occasionally longer periods will be relevant. 6. Middle categories imply that the patient supplies over 50 per cent of the effort. 7. Use of aids to be independent is allowed. Mikael Perkins., Barthel, D.W. (1183). Functional evaluation: the Barthel Index. [facility] (14)2. DAO Stroud, Belem Bhardwaj., Shakira Calderon., [place], [address] [place] [address] (1999). Measuring the change indisability after inpatient rehabilitation; comparison of the responsiveness of the Barthel Index and Functional [place] Measure. Journal of Neurology, Neurosurgery, and Psychiatry, 66(4), 662-590. Nba Castañeda NEdilbertoJ.A, YESSENAI Hassan, & Mark Barcenas, MEdilbertoA. (2004.) Assessment of post-stroke quality of life in cost-effectiveness studies: The usefulness of the Barthel Index and the EuroQoL-5D. [facility], 13, 269-02 Occupational Therapy Evaluation Charge Determination History Examination Decision-Making LOW Complexity : Brief history review  LOW Complexity : 1-3 performance deficits relating to physical, cognitive , or psychosocial skils that result in activity limitations and / or participation restrictions  MEDIUM Complexity : Patient may present with comorbidities that affect occupational performnce. Miniml to moderate modification of tasks or assistance (eg, physical or verbal ) with assesment(s) is necessary to enable patient to complete evaluation Based on the above components, the patient evaluation is determined to be of the following complexity level: MEDIUM Pain Rating: 
None Activity Tolerance:  
Poor Please refer to the flowsheet for vital signs taken during this treatment. After treatment patient left in no apparent distress:   
Sitting in chair and Call bell within reach COMMUNICATION/EDUCATION:  
The patients plan of care was discussed with: Physical Therapist, Registered Nurse, and Patient. Home safety education was provided and the patient/caregiver indicated understanding., Patient/family have participated as able in goal setting and plan of care. , and Patient/family agree to work toward stated goals and plan of care. Thank you for this referral. 
Talha Baker OTR/L Time Calculation: 17 mins

## 2020-01-16 VITALS
WEIGHT: 191.1 LBS | SYSTOLIC BLOOD PRESSURE: 154 MMHG | HEIGHT: 65 IN | HEART RATE: 93 BPM | TEMPERATURE: 97.3 F | RESPIRATION RATE: 16 BRPM | OXYGEN SATURATION: 93 % | DIASTOLIC BLOOD PRESSURE: 85 MMHG | BODY MASS INDEX: 31.84 KG/M2

## 2020-01-16 LAB
ANION GAP SERPL CALC-SCNC: 4 MMOL/L (ref 5–15)
BUN SERPL-MCNC: 45 MG/DL (ref 6–20)
BUN/CREAT SERPL: 34 (ref 12–20)
CALCIUM SERPL-MCNC: 9.2 MG/DL (ref 8.5–10.1)
CHLORIDE SERPL-SCNC: 110 MMOL/L (ref 97–108)
CO2 SERPL-SCNC: 31 MMOL/L (ref 21–32)
CREAT SERPL-MCNC: 1.32 MG/DL (ref 0.55–1.02)
ERYTHROCYTE [DISTWIDTH] IN BLOOD BY AUTOMATED COUNT: 18 % (ref 11.5–14.5)
GLUCOSE BLD STRIP.AUTO-MCNC: 109 MG/DL (ref 65–100)
GLUCOSE BLD STRIP.AUTO-MCNC: 118 MG/DL (ref 65–100)
GLUCOSE BLD STRIP.AUTO-MCNC: 122 MG/DL (ref 65–100)
GLUCOSE BLD STRIP.AUTO-MCNC: 159 MG/DL (ref 65–100)
GLUCOSE SERPL-MCNC: 128 MG/DL (ref 65–100)
HCT VFR BLD AUTO: 32.3 % (ref 35–47)
HGB BLD-MCNC: 10 G/DL (ref 11.5–16)
INR PPP: 3.1 (ref 0.9–1.1)
MCH RBC QN AUTO: 23.1 PG (ref 26–34)
MCHC RBC AUTO-ENTMCNC: 31 G/DL (ref 30–36.5)
MCV RBC AUTO: 74.8 FL (ref 80–99)
NRBC # BLD: 0 K/UL (ref 0–0.01)
NRBC BLD-RTO: 0 PER 100 WBC
PLATELET # BLD AUTO: 211 K/UL (ref 150–400)
PMV BLD AUTO: 10.8 FL (ref 8.9–12.9)
POTASSIUM SERPL-SCNC: 3.6 MMOL/L (ref 3.5–5.1)
PROTHROMBIN TIME: 29.6 SEC (ref 9–11.1)
RBC # BLD AUTO: 4.32 M/UL (ref 3.8–5.2)
SERVICE CMNT-IMP: ABNORMAL
SODIUM SERPL-SCNC: 145 MMOL/L (ref 136–145)
WBC # BLD AUTO: 5.3 K/UL (ref 3.6–11)

## 2020-01-16 PROCEDURE — 74011250637 HC RX REV CODE- 250/637: Performed by: INTERNAL MEDICINE

## 2020-01-16 PROCEDURE — 85027 COMPLETE CBC AUTOMATED: CPT

## 2020-01-16 PROCEDURE — 82962 GLUCOSE BLOOD TEST: CPT

## 2020-01-16 PROCEDURE — 36415 COLL VENOUS BLD VENIPUNCTURE: CPT

## 2020-01-16 PROCEDURE — 80048 BASIC METABOLIC PNL TOTAL CA: CPT

## 2020-01-16 PROCEDURE — 85610 PROTHROMBIN TIME: CPT

## 2020-01-16 RX ORDER — WARFARIN 1 MG/1
1 TABLET ORAL ONCE
Status: DISCONTINUED | OUTPATIENT
Start: 2020-01-16 | End: 2020-01-16 | Stop reason: HOSPADM

## 2020-01-16 RX ADMIN — MEMANTINE HYDROCHLORIDE 10 MG: 10 TABLET ORAL at 08:24

## 2020-01-16 RX ADMIN — Medication 10 ML: at 06:09

## 2020-01-16 RX ADMIN — BENZTROPINE MESYLATE 1 MG: 1 TABLET ORAL at 08:25

## 2020-01-16 RX ADMIN — CARVEDILOL 3.12 MG: 3.12 TABLET, FILM COATED ORAL at 08:24

## 2020-01-16 RX ADMIN — AMLODIPINE BESYLATE 10 MG: 5 TABLET ORAL at 08:24

## 2020-01-16 RX ADMIN — FERROUS SULFATE TAB 325 MG (65 MG ELEMENTAL FE) 325 MG: 325 (65 FE) TAB at 08:24

## 2020-01-16 RX ADMIN — PANTOPRAZOLE SODIUM 40 MG: 40 TABLET, DELAYED RELEASE ORAL at 08:25

## 2020-01-16 NOTE — DISCHARGE SUMMARY
Hospitalist Discharge Summary Patient ID: 
Mila Vaughan 
[id number] 
64 y.o. 
11/7/1927 1/13/2020 PCP on record: Kaylene Kincaid MD 
 
Admit date: 1/13/2020 Discharge date and time: 1/16/2020 DISCHARGE DIAGNOSIS: 
Hypoglycemia DM 2 Chronic Atrial fibrillation Chronic Kidney Disease Dementia Continue home medication History of chronic combined systolic and diastolic heart failure DNR 
 
CONSULTATIONS: 
IP CONSULT TO HOSPITALIST Excerpted HPI from H&P of Lewis Melton MD: 
1years old female from home with past medical history significant for CHF, dementia, DM, hypertension was transferred from [place] for evaluation of hypoglycemia blood sugar check was found to be 41, blood work was done and show blood glucose 188, BUN  108 , creatinine 2.48 . 
we were asked to admit for work up and evaluation of the above problems. ______________________________________________________________________ DISCHARGE SUMMARY/HOSPITAL COURSE:  for full details see H&P, daily progress notes, labs, consult notes. Hypoglycemia DM 2 Blood sugar remain stable without the drip for last 24 hours Cotninue to hold her Trajenda, Lantus and Trulicity upon discharge and just treat with SSI for now. May resume Trajenda vs low dose insulin as an OP if blood sugar starts to run high 
  
Chronic Atrial fibrillation Continue Coumadin 
  
Chronic Kidney Disease Renal function close to baseline 
  
Dementia Psych DO (Unspecified) We continued Cogentin but held Fluphenazine. Per family, they have never seen pt awake like in current status and will likely to continue with same regimen. We we will continue to hold Fluphenazine upon discharge as seems holding it is working for her 
  
History of chronic combined systolic and diastolic heart failure Continue torsemide  
   
Code Status: DNR Surrogate Decision Maker:  Joey Isidro 
  
DVT Prophylaxis: coumadin  
 _______________________________________________________________________ Patient seen and examined by me on discharge day. Pertinent Findings: 
Gen:    Not in distress Chest: Clear lungs CVS:   Regular rhythm. No edema Abd:  Soft, not distended, not tender Neuro:  Alert, non focal 
_______________________________________________________________________ DISCHARGE MEDICATIONS:  
Current Discharge Medication List  
  
CONTINUE these medications which have NOT CHANGED Details  
!! warfarin (COUMADIN) 2.5 mg tablet Take 2.5 mg by mouth two (2) days a week. Patient takes 5 mg M,W,TH,SAT, SUN and takes 2.5 mg every TUES and FRI  
  
albuterol-ipratropium (DUO-NEB) 2.5 mg-0.5 mg/3 ml nebu 3 mL by Nebulization route every four (4) hours as needed for Wheezing. zinc oxide (SECURA EXTRA PROTECTIVE) 30.6 % topical cream Apply  to affected area four (4) times daily as needed for Skin Irritation (to right buttock). insulin lispro (HUMALOG U-100 INSULIN) 100 unit/mL injection 5 Units by SubCUTAneous route three (3) times daily (with meals). alendronate (FOSAMAX) 70 mg tablet Take 70 mg by mouth every seven (7) days. !! fluPHENAZine (PROLIXIN) 1 mg tablet Take 1 mg by mouth daily. !! fluPHENAZine (PROLIXIN) 1 mg tablet Take 1 mg by mouth nightly. pantoprazole (PROTONIX) 40 mg tablet Take 40 mg by mouth daily. torsemide (DEMADEX) 100 mg tablet Take 100 mg by mouth daily. acetaminophen (TYLENOL) 325 mg tablet Take 650 mg by mouth every six (6) hours as needed for Pain. allopurinol (ZYLOPRIM) 100 mg tablet Take 100 mg by mouth daily. amLODIPine (NORVASC) 10 mg tablet Take 10 mg by mouth daily. !! warfarin (COUMADIN) 5 mg tablet Take 5 mg by mouth five (5) days a week. Patient takes 5 mg M,W,TH,SAT, SUN and takes 2.5 mg every TUES and FRI  
  
carvedilol (COREG) 3.125 mg tablet Take 3.125 mg by mouth two (2) times daily (with meals). ferrous sulfate (IRON) 325 mg (65 mg iron) EC tablet Take 325 mg by mouth Before breakfast and dinner. benztropine (COGENTIN) 1 mg tablet Take 1 mg by mouth two (2) times a day. Associated Diagnoses: Short of breath on exertion; Lower extremity edema  
  
memantine ER (NAMENDA XR) 28 mg capsule Take 28 mg by mouth daily. Associated Diagnoses: Short of breath on exertion; Lower extremity edema  
  
potassium chloride (KAON 10%) 20 mEq/15 mL solution Take 20 mEq by mouth daily. Associated Diagnoses: Short of breath on exertion; Lower extremity edema  
  
pravastatin (PRAVACHOL) 20 mg tablet Take 20 mg by mouth nightly. Associated Diagnoses: Short of breath on exertion; Lower extremity edema  
  
 !! - Potential duplicate medications found. Please discuss with provider. STOP taking these medications  
  
 linaGLIPtin (TRADJENTA) 5 mg tablet Comments:  
Reason for Stopping:   
   
 dulaglutide (TRULICITY) 1.10 YL/1.3 mL sub-q pen Comments:  
Reason for Stopping:   
   
 insulin glargine (LANTUS U-100 INSULIN) 100 unit/mL injection Comments:  
Reason for Stopping: Follow-up Information Follow up With Specialties Details Why Contact Info [address] [place] [address] 
[phone] Adam Rivera MD Internal Medicine   [address] [place] [address] 
[phone] 
  
  
 
________________________________________________________________ Risk of deterioration: Low 
 
Condition at Discharge:  Stable 
__________________________________________________________________ Disposition SNF/LTC 
 
____________________________________________________________________ Code Status: DNR/DNI 
___________________________________________________________________ Total time in minutes spent coordinating this discharge (includes going over instructions, follow-up, prescriptions, and preparing report for sign off to her PCP) :  35 minutes Signed: Cory Pearson MD

## 2020-01-16 NOTE — PROGRESS NOTES
Pharmacy Daily Dosing of Warfarin Indication: afib Goal INR: 2-3 PTA Warfarin Dose: 2.5mg Tues/Fri, 5mg M/W/Th/Sat/Blake Concurrent anticoagulants: none Concurrent antiplatelet: none Major Interacting Medications Drugs that may increase INR: allopurinol (increased risk of bleeding, home med) Drugs that may decrease INR: none Conditions that may increase/decrease INR (CHF, C. diff, cirrhosis, thyroid disorder, hypoalbuminemia): hypoalbuminemia Labs: 
Recent Labs  
  01/16/20 
0143 01/15/20 
0443 01/14/20 
0830 01/14/20 
0442  01/13/20 2239 INR 3.1* 3.1* 2.9*  --    < >  --   
HGB 10.0* 9.2*  --  9.6*  --  9.6*  233  --  244  --  287 SGOT  --   --   --   --   --  20  
TBILI  --   --   --   --   --  0.2 ALB  --   --   --   --   --  2.6*  
 < > = values in this interval not displayed. Impression/Plan:  
Will order warfarin 1 mg PO x 1 dose. Gave about half of normal dose for Tuesday due to patient's INR rising without a warfarin dose being given on 1/13. Daily INR 
CBC w/o diff QOD Pharmacy will follow daily and adjust the dose as appropriate. Thanks Aurea Dyson PHARMD 
 
 
[email or web address]/Pharmacy/Clinical%20Companion/Warfarin%20Dosing%20Protocol. pdf

## 2020-01-16 NOTE — PROGRESS NOTES
Bedside and Verbal shift change report given to Romel Elam RN (oncoming nurse) by Chelsea Hidalgo RN (offgoing nurse). Report included the following information SBAR, Kardex, Recent Results, Med Rec Status and Cardiac Rhythm a fib. 
  
Zone Phone:   [phone] 
  
  
Significant changes during shift:  Blood glucose stable, refused 12 am assessment and vitals \"I just want to sleep\" no distress noted. BUN 45 trending down, CR 1.32 also trending down, INR 3.1, PT 29.6.  
  
  
  
Patient Information 
  
Christie Truong 
80 y.o. 
1/13/2020  9:05 PM by Dk Cintron MD. Christie Truong was admitted from SNF LTC 
  
Problem List 
  
    
Patient Active Problem List  
  Diagnosis Date Noted  Hypoglycemia 01/14/2020  Tachy-kate syndrome ([facility] [address] 09/18/2019  
 SSS (sick sinus syndrome) ([facility] 09/17/2019  Closed fracture of right hip ([facility] 09/16/2019  Alzheimer's disease ([facility]    
 CHF (congestive heart failure) (HCC)    
 Bradycardia 12/11/2015  Fall as cause of accidental injury in residential institution as place of occurrence 12/11/2015  Elevated troponin 12/11/2015  Hyperlipidemia    
 Essential hypertension    
 Dementia (HCC)    
 Diabetes (HCC)    
 Paroxysmal atrial fibrillation (HCC)    
 Short of breath on exertion 08/07/2014  
  
    
Past Medical History:  
Diagnosis Date  Alzheimer's disease ([facility]    
 CHF (congestive heart failure) (HCC)    
 Dementia (HCC)    
 Diabetes ([facility]    
  type II  
 Elevated troponin 12/11/2015  Essential hypertension    
 Fall as cause of accidental injury in residential institution as place of occurrence 12/11/2015  GERD (gastroesophageal reflux disease)    
 Heart failure (HCC)    
 Hyperlipidemia    
 Hyperlipidemia    
 Hypoglycemia    
 Ill-defined condition    
  embolism (DVT)  Paroxysmal atrial fibrillation (HCC)    
 Psychiatric disorder    
  anxiety  
  
  
  
Core Measures: 
  
CVA: No No 
CHF:Yes No 
PNA:No No 
  
 Post Op Surgical (If Applicable):  
  
Number times ambulated in hallway past shift:  0 Number of times OOB to chair past shift:   0 
NG Tube: No 
Incentive Spirometer: No 
Drains: No   Volume  0 Dressing Present:  No 
Flatus:  Not applicable 
  
Activity Status: 
  
OOB to Chair No 
Ambulated this shift No  
Bed Rest No 
  
Supplemental O2: (If Applicable) 
  
NC No 
NRB No 
Venti-mask No 
On 0 Liters/min 
  
  
LINES AND DRAINS: 
  
Central Line? No  
PICC LINE? No  
Urinary Catheter? No  
DVT prophylaxis: 
  
DVT prophylaxis Med- Yes DVT prophylaxis SCD or CORAL- No  
  
Wounds: (If Applicable) 
  
Wounds-  redness 
  
Location Sacral 
  
Patient Safety: 
  
Falls Score Total Score: 5 Safety Level_______ Bed Alarm On? Yes Sitter? Not applicable 
  
Plan for upcoming shift: safety, fs every 2 hours   
  
  
Discharge Plan: Yes probably back to [facility] 
  
Active Consults: 
IP CONSULT TO HOSPITALIST

## 2020-01-16 NOTE — PROGRESS NOTES
Problem: Falls - Risk of 
Goal: *Absence of Falls Description Document Robbparis Perez Fall Risk and appropriate interventions in the flowsheet. Outcome: Progressing Towards Goal 
Note: Fall Risk Interventions: 
Mobility Interventions: Bed/chair exit alarm Mentation Interventions: Adequate sleep, hydration, pain control, Bed/chair exit alarm, Door open when patient unattended Medication Interventions: Bed/chair exit alarm Elimination Interventions: Bed/chair exit alarm, Patient to call for help with toileting needs History of Falls Interventions: Bed/chair exit alarm Problem: Pressure Injury - Risk of 
Goal: *Prevention of pressure injury Description Document Arnulfo Scale and appropriate interventions in the flowsheet. Outcome: Progressing Towards Goal 
Note: Pressure Injury Interventions: 
Sensory Interventions: Assess changes in LOC Moisture Interventions: Check for incontinence Q2 hours and as needed Activity Interventions: Increase time out of bed Mobility Interventions: PT/OT evaluation Nutrition Interventions: Document food/fluid/supplement intake Friction and Shear Interventions: Lift sheet, Minimize layers Problem: Patient Education: Go to Patient Education Activity Goal: Patient/Family Education Outcome: Progressing Towards Goal

## 2020-01-16 NOTE — PROGRESS NOTES
TRANSFER - OUT REPORT: 
 
Verbal report given to lindsay(name) on Cristin Sanders  being transferred to [place](unit) for routine progression of care Report consisted of patients Situation, Background, Assessment and  
Recommendations(SBAR). Information from the following report(s) SBAR and Kardex was reviewed with the receiving nurse. Lines:    
 
Opportunity for questions and clarification was provided. Patient transported with: 
 [facility]

## 2020-01-16 NOTE — DISCHARGE INSTRUCTIONS
HOSPITALIST DISCHARGE INSTRUCTIONS    NAME: Roldan Schmidt   :  1927   MRN:  [id number]     Date/Time:  2020 9:02 AM    ADMIT DATE: 2020   DISCHARGE DATE: 2020     Attending Physician: Pasquale Sauceda MD    DISCHARGE DIAGNOSIS:  Hypoglycemia  DM 2  Chronic Atrial fibrillation  Chronic Kidney Disease  Dementia  Continue home medication  History of chronic combined systolic and diastolic heart failure  DNR    Medications: Per above medication reconciliation. Pain Management: per above medications     Recommended diet: Diabetic Diet    Recommended activity: PT/OT Eval and Treat    Wound care: None    Indwelling devices:  None    Supplemental Oxygen: None    Required Lab work: BMP in 5 days result to be sent to [facility] and [facility], Daily INR check daily for 3 days then as per SNF    Glucose management:  Accucheck ACHS with sliding scale per SNF protocol    Code status: DNR               Skilled nursing facility/ SNF MD responsible for above on discharge. Information obtained by :  I understand that if any problems occur once I am at home I am to contact my physician. I understand and acknowledge receipt of the instructions indicated above.                                                                                                                                            Physician's or R.N.'s Signature                                                                  Date/Time                                                                                                                                              Patient or Repres

## 2020-01-16 NOTE — PROGRESS NOTES
PCT and Nurse informed writing patient refused vitals and blood glucose check, writer in to assess patient continues to refuse stating \"im tired\" while drawing back her covers, no apparent distress noted will continue to monitor and attempt to reassess at a later time.

## 2020-01-16 NOTE — PROGRESS NOTES
JAMES Plan: 
  
* Return to [address] [place] [address] (LTC) 
 
> Call report to [facility] at d/c ([phone]) 
> CM faxed d/c summary to facility ([phone]) 
> [facility] transportation secured for 12:00 PM 
 
11:28 AM: CM met with pt's daughter at bedside to provide 2nd IM letter; medicare pt's sister has received, reviewed, and signed 2nd IM letter informing them of pt's right to appeal the discharge. Signed copy has been placed on pt bedside chart. No further CM needs identified at this time; CM will remain available for consult if additional needs arise before pt leaves facility. CM notified pt's nurse of d/c. 
 
9:14 AM: CM conducted room visit to meet with pt at bedside and discuss d/c plan; pt is asleep and did not arouse to the sound of CM's voice. Pt has no family present at bedside. CM attempted to provide 2nd IM letter; pt unable to sign. CM will re-attempt before pt leaves facility. [facility] transport confirmed for 12:00 PM; PCS on bedside chart. Initial note: CM received update from attending physician that pt will be d/c back to [address] [place] [address] today. CM called pt's son Rashmi Kothari: [phone]) to relay update and review JAMES plan; pt's son is agreeable and reported no questions or concerns. CM called [address] [place] [address] ([phone]) and spoke to admissions coordinator, Kelton Nicholson to inform her of pt's d/c; Kelton Nicholson verbalized understanding and reported facility is anticipating pt's arrival. CM sent referral via [employer] to [facility] to arrange medical transport; CM will confrim pick-up time once available. Care Management Interventions PCP Verified by CM: Yes Mode of Transport at Discharge: [facility] Hospital Transport Time of Discharge: 1200 Transition of Care Consult (CM Consult): Long Term Care(return to [address] [place] [address]) Discharge Durable Medical Equipment: No 
Physical Therapy Consult: Yes Occupational Therapy Consult: Yes Speech Therapy Consult: No 
 Current Support Network: [address] [place] [address] Confirm Follow Up Transport: Family Discharge Location Discharge Placement: Long Term Care(Return to [address] [place] [address] Street) ДМИТРИЙ Braxton Care Manager, [facility] 
[phone]

## 2020-01-17 ENCOUNTER — PATIENT OUTREACH (OUTPATIENT)
Dept: CARDIOLOGY CLINIC | Age: 85
End: 2020-01-17

## 2020-01-17 ENCOUNTER — PATIENT OUTREACH (OUTPATIENT)
Dept: CASE MANAGEMENT | Age: 85
End: 2020-01-17

## 2020-01-17 NOTE — PROGRESS NOTES
Community Care Team documentation Patient was discharged to [facility]. Information included in this progress note has been provided to SNF. Hospital Admission and Diagnosis: ED [facility] 1/13-1/16  Hypoglycemia PCP : Kaylene Kincaid MD 
 
Spoke with SNF team.  Patient is not skilled LTC patient at [facility]. Kristal Solis RN, BSN,  [facility] Team 
[phone]

## 2020-01-19 LAB
BACTERIA SPEC CULT: NORMAL
SERVICE CMNT-IMP: NORMAL

## 2020-01-31 NOTE — PROGRESS NOTES
D/c to [facility] 1/16/20 still admitted f/i 16d

## 2020-03-20 PROBLEM — R41.0 CONFUSION: Status: ACTIVE | Noted: 2020-01-01

## 2020-03-20 NOTE — WOUND CARE
Wound care nurse consult from Dr Maribel Blackwood stating \" left knee erythema\". Patient is a 79 y/o AAF admitted for acute encephalopathy. Past Medical History:  
Diagnosis Date  Alzheimer's disease ([facility]  CHF (congestive heart failure) ([facility]  Dementia ([facility]  Diabetes ([facility] type II  
 Elevated troponin 12/11/2015  Essential hypertension  Fall as cause of accidental injury in residential institution as place of occurrence 12/11/2015  GERD (gastroesophageal reflux disease)  Heart failure ([facility]  Hyperlipidemia  Hyperlipidemia  Hypoglycemia  Ill-defined condition   
 embolism (DVT)  Paroxysmal atrial fibrillation (HCC)  Psychiatric disorder   
 anxiety Patient has an area on left knee of unknown etiology: 
 
 
Appears to either an abraded area or a patch of vesicles. Patient unable to provide history. Area is dry, NOT painful to palpation. Recommend: 
 
Left knee: daily cleanse with povidine/betadine swabs, let air dry and leave CATA. Cleo Kaur RN, [employer]

## 2020-03-20 NOTE — PROGRESS NOTES
Speech path Acknowledge consult to evaluate this pt but she is lethargic. She is currently leaving the floor for testing. We will follow up later today.   
Abiola Freitas, SLP

## 2020-03-20 NOTE — PROGRESS NOTES
2 D Echo attempted unsuccessfully . PT. Uncooperative unable to perform exam. At this time spoke with EDMUND Gonzales Informed him we will try again in AM.

## 2020-03-20 NOTE — PROGRESS NOTES
SPEECH THERAPY SCREENING: 
SERVICES ARE INDICATED when the patient is alert. An InBasket screening referral was triggered for speech therapy based on results obtained during the nursing admission assessment. The patients chart was reviewed and the patient is appropriate for a skilled therapy evaluation. Please order a consult for speech therapy if you are in agreement and would like an evaluation to be completed. Thank you.  
 
Brandin Mcclellan, SLP

## 2020-03-20 NOTE — ED NOTES
Placed purewick on pt at this time. Pt has personal comforter from nursing facility with her in the ER stretcher.

## 2020-03-20 NOTE — ED NOTES
Patient has vesicular erythema area on L knee cap, and interior on L thigh that has been covered with gauze and tape.

## 2020-03-20 NOTE — PROGRESS NOTES
Occupational Therapy Acknowledge OT order and completed chart review. Patient off the floor for MRI. Will continue to follow for OT eval. 
 
 
Thank you, Félix , OT

## 2020-03-20 NOTE — ED NOTES
Patients son is at bedside. Patients son states that the pt does have a hx of dementia, however, is able to hold a short conversation normally with clear speech. Pt's son states that the way the pt is presenting today is not her baseline. Pt normally uses a wheelchair at nursing facility d/t having a hip replacement last year. Pt is only alert to her name and . Pt has a dry cough, pt has not passed dysphagia screening d/t AMS, vocal secretion abnormality. Dr. Jamia Stockton is at bedside to talk to family member. CT in on the way to take pt for scans.

## 2020-03-20 NOTE — PROGRESS NOTES
SPEECH LANGUAGE PATHOLOGY BEDSIDE SWALLOW EVALUATION Patient: Gabbi Gamble (84 y.o. female) Date: 3/20/2020 Primary Diagnosis: Confusion [R41.0] Precautions:     
 
ASSESSMENT : 
Based on the objective data described below, the patient presents with mod oropharyngeal dysphagia. She accepted the spoon with min difficulty for ice and thin liquids. Slow oral manipulation, slow posterior propulsion, no mastication of ice due to poor dentition. Some oral loss on the R with water. Suspect mild swallow delay, weak hyolaryngeal excursion. No cough noted however she could not maintain arousal for the evaluation. Her speech was initially very difficult to understand but after a few ice chip bolus, her speech was more intelligible. Her oral mucosa was very dry initially. Patient will benefit from skilled intervention to address the above impairments. Patients rehabilitation potential is considered to be Fair PLAN : 
Recommendations and Planned Interventions: 
Recommend NPO til more alert Repeat the STAND if more alert over the weekend. Frequency/Duration: Patient will be followed by speech-language pathology 3 times a week to address goals. Discharge Recommendations: To Be Determined SUBJECTIVE:  
Patient spoke at sentence level. OBJECTIVE:  
 
Past Medical History:  
Diagnosis Date  Alzheimer's disease ([address]  CHF (congestive heart failure) ([address]  Dementia ([address]  Diabetes ([address] type II  
 Elevated troponin 12/11/2015  Essential hypertension  Fall as cause of accidental injury in residential institution as place of occurrence 12/11/2015  GERD (gastroesophageal reflux disease)  Heart failure ([address]  Hyperlipidemia  Hyperlipidemia  Hypoglycemia  Ill-defined condition   
 embolism (DVT)  Paroxysmal atrial fibrillation (HCC)  Psychiatric disorder   
 anxiety Past Surgical History:  
Procedure Laterality Date  HX APPENDECTOMY Prior Level of Function/Home Situation: 
  
Diet prior to admission: 
Current Diet:  NPO Cognitive and Communication Status: 
Neurologic State: Confused Orientation Level: Oriented to person Cognition: Impaired decision making Perception: Appears intact Perseveration: No perseveration noted Oral Assessment: 
Oral Assessment Labial: No impairment Dentition: Poor Lingual: Other (comment)(at midline ) Velum: Unable to visualize Mandible: No impairment P.O. Trials: 
Patient Position: upright in bed Vocal quality prior to P.O.: No impairment Consistency Presented: Thin liquid; Ice chips How Presented: SLP-fed/presented;Spoon Bolus Acceptance: Impaired Bolus Formation/Control: Impaired Type of Impairment: Delayed Propulsion: Delayed (# of seconds) Oral Residue: None Initiation of Swallow: Delayed (# of seconds) Laryngeal Elevation: Decreased Aspiration Signs/Symptoms: None Effective Modifications: None Cues for Modifications: None Oral Phase Severity: Moderate Pharyngeal Phase Severity : Moderate-severe NOMS:  
The NOMS functional outcome measure was used to quantify this patient's level of swallowing impairment. Based on the NOMS, the patient was determined to be at level 2 for swallow function NOMS Swallowing Levels: 
Level 1 (CN): NPO Level 2 (CM): NPO but takes consistency in therapy Level 3 (CL): Takes less than 50% of nutrition p.o. and continues with nonoral feedings; and/or safe with mod cues; and/or max diet restriction Level 4 (CK): Safe swallow but needs mod cues; and/or mod diet restriction; and/or still requires some nonoral feeding/supplements Level 5 (CJ): Safe swallow with min diet restriction; and/or needs min cues Level 6 (CI): Independent with p.o.; rare cues; usually self cues; may need to avoid some foods or needs extra time Level 7 (CH): Independent for all p.o. MICHAEL. (2003).  National Outcomes Measurement System (NOMS): Adult Speech-Language Pathology User's Guide. Pain: 
Pain Scale 1: Adult Nonverbal Pain Scale After treatment:  
Patient left in no apparent distress in bed COMMUNICATION/EDUCATION:  
Patient was educated regarding her deficit(s) of dysphagia as this relates to her diagnosis of AMS. She demonstrated guarded prognosis The patient's plan of care including recommendations, planned interventions, and recommended diet changes were discussed with: Registered nurse. Patient is unable to participate in goal setting and plan of care.  
 
Thank you for this referral. 
Kuldeep Ramachandran, SLP

## 2020-03-20 NOTE — ED PROVIDER NOTES
EMERGENCY DEPARTMENT HISTORY AND PHYSICAL EXAM 
 
 
Date: 3/20/2020 Patient Name: Cristin Sanders Patient Age and Sex: 80 y.o. female History of Presenting Illness Chief Complaint Patient presents with  Extremity Weakness Patient arrives to ER via EMS coming from a nursing home. Nursing staff reported to EMS that the patient has been having R sided weakness since yesterday, LKW was 08:30 a.m. yesterday per nursing home staff. Pt has R sided weakness/minimal effort against gravity, r sided facial droop, dysarthria, and contracted on R hand. History Provided By: Patient HPI: Cristin Sanders is a 71-year-old female with a history of heart failure, hypoglycemia, dementia, diabetes, Alzheimer's, presenting for altered mental status and extremity weakness. According to nursing home staff, they think this all began around 8:30 AM yesterday, about 24 hours ago. They state that she was having right sided weakness yesterday but they did not bring her in yesterday. Also noticed a contracted right hand. They also state that today she has been more altered with dysarthria. Normally she is conversant. There was no report on fevers or dysuria or diarrhea. EMS reported her glucose was 238 in route and they were unable to get her to do anything. Did notice a contracture of the right hand but patient not following any commands to figure out weakness of one side versus another. There are no other complaints, changes, or physical findings at this time. PCP: Susan Ochoa MD 
 
No current facility-administered medications on file prior to encounter. Current Outpatient Medications on File Prior to Encounter Medication Sig Dispense Refill  warfarin (COUMADIN) 2.5 mg tablet Take 2.5 mg by mouth two (2) days a week. Patient takes 5 mg M,W,TH,SAT, SUN and takes 2.5 mg every TUES and FRI  albuterol-ipratropium (DUO-NEB) 2.5 mg-0.5 mg/3 ml nebu 3 mL by Nebulization route every four (4) hours as needed for Wheezing.  zinc oxide (SECURA EXTRA PROTECTIVE) 30.6 % topical cream Apply  to affected area four (4) times daily as needed for Skin Irritation (to right buttock).  insulin lispro (HUMALOG U-100 INSULIN) 100 unit/mL injection 5 Units by SubCUTAneous route three (3) times daily (with meals).  alendronate (FOSAMAX) 70 mg tablet Take 70 mg by mouth every seven (7) days.  pantoprazole (PROTONIX) 40 mg tablet Take 40 mg by mouth daily.  torsemide (DEMADEX) 100 mg tablet Take 100 mg by mouth daily.  acetaminophen (TYLENOL) 325 mg tablet Take 650 mg by mouth every six (6) hours as needed for Pain.  allopurinol (ZYLOPRIM) 100 mg tablet Take 100 mg by mouth daily.  amLODIPine (NORVASC) 10 mg tablet Take 10 mg by mouth daily.  warfarin (COUMADIN) 5 mg tablet Take 5 mg by mouth five (5) days a week. Patient takes 5 mg M,W,TH,SAT, SUN and takes 2.5 mg every TUES and FRI  carvedilol (COREG) 3.125 mg tablet Take 3.125 mg by mouth two (2) times daily (with meals).  ferrous sulfate (IRON) 325 mg (65 mg iron) EC tablet Take 325 mg by mouth Before breakfast and dinner.  benztropine (COGENTIN) 1 mg tablet Take 1 mg by mouth two (2) times a day.  memantine ER (NAMENDA XR) 28 mg capsule Take 28 mg by mouth daily.  potassium chloride (KAON 10%) 20 mEq/15 mL solution Take 20 mEq by mouth daily.  pravastatin (PRAVACHOL) 20 mg tablet Take 20 mg by mouth nightly. Past History Past Medical History: 
Past Medical History:  
Diagnosis Date  Alzheimer's disease ([facility] [address]  CHF (congestive heart failure) ([facility] [address]  Dementia ([facility] [address]  Diabetes ([facility] [address] type II  
 Elevated troponin 12/11/2015  Essential hypertension  Fall as cause of accidental injury in residential institution as place of occurrence 12/11/2015  GERD (gastroesophageal reflux disease)  Heart failure ([address]  Hyperlipidemia  Hyperlipidemia  Hypoglycemia  Ill-defined condition   
 embolism (DVT)  Paroxysmal atrial fibrillation (HCC)  Psychiatric disorder   
 anxiety Past Surgical History: 
Past Surgical History:  
Procedure Laterality Date  HX APPENDECTOMY Family History: 
History reviewed. No pertinent family history. Social History: 
Social History Tobacco Use  Smoking status: Never Smoker  Smokeless tobacco: Never Used Substance Use Topics  Alcohol use: No  
 Drug use: No  
 
 
Allergies: Allergies Allergen Reactions  Influenza Virus Vaccine, Specific Hives  Penicillins Unknown (comments)  Sulfacetamide Unknown (comments) Review of Systems Review of Systems Unable to perform ROS: Dementia Physical Exam  
Physical Exam 
Constitutional:   
   General: She is not in acute distress. Appearance: She is well-developed. HENT:  
   Head: Normocephalic and atraumatic. Neck: Musculoskeletal: Normal range of motion. Cardiovascular:  
   Rate and Rhythm: Normal rate and regular rhythm. Comments: Well perfused Pulmonary:  
   Effort: Pulmonary effort is normal. No respiratory distress. Abdominal:  
   General: Abdomen is flat. There is no distension. Palpations: Abdomen is soft. There is no mass. Musculoskeletal: Normal range of motion. Neurological:  
   Mental Status: She is alert. Comments: Patient does open her eyes to verbal stimulus. She is not following any commands such as giving us a smile, squeezing her hands or lifting of her legs. Does have a contracted right hand. Psychiatric:     
   Mood and Affect: Mood normal.  
   Comments: Unable to assess Diagnostic Study Results Labs - Recent Results (from the past 12 hour(s)) EKG, 12 LEAD, INITIAL Collection Time: 03/20/20  8:48 AM  
Result Value Ref Range Ventricular Rate 74 BPM  
 Atrial Rate 72 BPM  
 QRS Duration 70 ms Q-T Interval 406 ms QTC Calculation (Bezet) 450 ms Calculated R Axis -15 degrees Calculated T Axis -2 degrees Diagnosis ** Poor data quality, interpretation may be adversely affected Recommend repeat Atrial fibrillation Confirmed by Briana Cheney MD, Stephanie Parson ([id number]) on 3/20/2020 11:26:10 AM 
  
CBC WITH AUTOMATED DIFF Collection Time: 03/20/20  9:04 AM  
Result Value Ref Range WBC 6.0 3.6 - 11.0 K/uL  
 RBC 4.46 3.80 - 5.20 M/uL  
 HGB 10.2 (L) 11.5 - 16.0 g/dL HCT 33.6 (L) 35.0 - 47.0 % MCV 75.3 (L) 80.0 - 99.0 FL  
 MCH 22.9 (L) 26.0 - 34.0 PG  
 MCHC 30.4 30.0 - 36.5 g/dL  
 RDW 17.1 (H) 11.5 - 14.5 % PLATELET 243 142 - 842 K/uL MPV 10.7 8.9 - 12.9 FL  
 NRBC 0.0 0  WBC ABSOLUTE NRBC 0.00 0.00 - 0.01 K/uL NEUTROPHILS 64 32 - 75 % LYMPHOCYTES 25 12 - 49 % MONOCYTES 9 5 - 13 % EOSINOPHILS 2 0 - 7 % BASOPHILS 0 0 - 1 % IMMATURE GRANULOCYTES 0 0.0 - 0.5 % ABS. NEUTROPHILS 3.8 1.8 - 8.0 K/UL  
 ABS. LYMPHOCYTES 1.5 0.8 - 3.5 K/UL  
 ABS. MONOCYTES 0.6 0.0 - 1.0 K/UL  
 ABS. EOSINOPHILS 0.1 0.0 - 0.4 K/UL  
 ABS. BASOPHILS 0.0 0.0 - 0.1 K/UL  
 ABS. IMM. GRANS. 0.0 0.00 - 0.04 K/UL  
 DF AUTOMATED METABOLIC PANEL, COMPREHENSIVE Collection Time: 03/20/20  9:04 AM  
Result Value Ref Range Sodium 142 136 - 145 mmol/L Potassium 3.6 3.5 - 5.1 mmol/L Chloride 108 97 - 108 mmol/L  
 CO2 29 21 - 32 mmol/L Anion gap 5 5 - 15 mmol/L Glucose 226 (H) 65 - 100 mg/dL BUN 42 (H) 6 - 20 MG/DL Creatinine 2.06 (H) 0.55 - 1.02 MG/DL  
 BUN/Creatinine ratio 20 12 - 20 GFR est AA 27 (L) >60 ml/min/1.73m2 GFR est non-AA 23 (L) >60 ml/min/1.73m2 Calcium 8.8 8.5 - 10.1 MG/DL Bilirubin, total 0.3 0.2 - 1.0 MG/DL  
 ALT (SGPT) 15 12 - 78 U/L  
 AST (SGOT) 7 (L) 15 - 37 U/L Alk. phosphatase 121 (H) 45 - 117 U/L Protein, total 7.5 6.4 - 8.2 g/dL Albumin 2.8 (L) 3.5 - 5.0 g/dL Globulin 4.7 (H) 2.0 - 4.0 g/dL A-G Ratio 0.6 (L) 1.1 - 2.2    
TROPONIN I Collection Time: 03/20/20  9:04 AM  
Result Value Ref Range Troponin-I, Qt. <0.05 <0.05 ng/mL CREATININE, POC Collection Time: 03/20/20  9:05 AM  
Result Value Ref Range Creatinine (POC) 1.9 (H) 0.6 - 1.3 mg/dL GFRAA, POC 30 (L) >60 ml/min/1.73m2 GFRNA, POC 25 (L) >60 ml/min/1.73m2 URINALYSIS W/ REFLEX CULTURE Collection Time: 03/20/20  9:32 AM  
Result Value Ref Range Color YELLOW/STRAW Appearance CLOUDY (A) CLEAR Specific gravity 1.014 1.003 - 1.030    
 pH (UA) 5.5 5.0 - 8.0 Protein 100 (A) NEG mg/dL Glucose 100 (A) NEG mg/dL Ketone NEGATIVE  NEG mg/dL Bilirubin NEGATIVE  NEG Blood NEGATIVE  NEG Urobilinogen 0.2 0.2 - 1.0 EU/dL Nitrites NEGATIVE  NEG Leukocyte Esterase NEGATIVE  NEG    
 WBC 0-4 0 - 4 /hpf  
 RBC 0-5 0 - 5 /hpf Epithelial cells FEW FEW /lpf Bacteria NEGATIVE  NEG /hpf  
 UA:UC IF INDICATED CULTURE NOT INDICATED BY UA RESULT CNI Hyaline cast 2-5 0 - 5 /lpf DRUG SCREEN, URINE Collection Time: 03/20/20  9:32 AM  
Result Value Ref Range AMPHETAMINES NEGATIVE  NEG    
 BARBITURATES NEGATIVE  NEG BENZODIAZEPINES NEGATIVE  NEG    
 COCAINE NEGATIVE  NEG METHADONE NEGATIVE  NEG    
 OPIATES NEGATIVE  NEG    
 PCP(PHENCYCLIDINE) NEGATIVE  NEG    
 THC (TH-CANNABINOL) NEGATIVE  NEG Drug screen comment (NOTE) PROTHROMBIN TIME + INR Collection Time: 03/20/20 10:32 AM  
Result Value Ref Range INR 1.3 (H) 0.9 - 1.1 Prothrombin time 13.5 (H) 9.0 - 11.1 sec POC EG7 Collection Time: 03/20/20 11:16 AM  
Result Value Ref Range Calcium, ionized (POC) 1.24 1.12 - 1.32 mmol/L  
 pH (POC) 7.413 7.35 - 7.45    
 pCO2 (POC) 46.0 (H) 35.0 - 45.0 MMHG  
 pO2 (POC) 69 (L) 80 - 100 MMHG  
 HCO3 (POC) 29.3 (H) 22 - 26 MMOL/L Base excess (POC) 5 mmol/L  
 sO2 (POC) 93 92 - 97 % Site LEFT RADIAL Device: ROOM AIR  Allens test (POC) YES    
 Specimen type (POC) ARTERIAL Total resp. rate 21 Radiologic Studies -  
MRI BRAIN WO CONT Final Result IMPRESSION:   
  
1. No acute intracranial abnormality. 2. Generalized parenchymal volume loss with severe disproportionate atrophy of  
the bilateral hippocampi and mesial temporal lobes, favored to represent Alzheimer's dementia. 3. Severe chronic microvascular ischemic disease. XR CHEST PORT Final Result IMPRESSION:  
  
Mild CHF pattern of pulmonary edema in the proper clinical setting, new since January. No lobar pneumonia or pneumothorax. CT HEAD WO CONT Final Result IMPRESSION:   
No acute intracranial process. Imaging findings consistent with severe chronic microvascular ischemic change. There is a severe degree of cerebral atrophy. CTA HEAD NECK W CONT Final Result IMPRESSION:   
  
Diminutive vertebrobasilar system with multifocal severe stenoses/occlusion of  
the basilar artery. There is persistent fetal circulation to the posterior  
cerebral artery on the right and on the left. Patent internal carotid artery and middle cerebral arteries on the right and on  
the left. Severe chronic microvascular ischemic change and severe cerebral atrophy as  
well. [facility] CT Results  (Last 48 hours) 03/20/20 1018  CT HEAD WO CONT Final result Impression:  IMPRESSION:   
No acute intracranial process. Imaging findings consistent with severe chronic microvascular ischemic change. There is a severe degree of cerebral atrophy. Narrative:  CLINICAL HISTORY: Altered mental status, dizziness INDICATION: Altered mental status, dizziness COMPARISON: None CT dose reduction was achieved through use of a standardized protocol tailored  
for this examination and automatic exposure control for dose modulation.    
TECHNIQUE: Serial axial images with a collimation of 5 mm were obtained from the  
 skull base through the vertex FINDINGS:   
There is sulcal and ventricular prominence. Confluent periventricular and  
scattered foci of hypodensity in the cerebral white matter. There is no evidence  
of an acute infarction, hemorrhage, or mass-effect. There is no evidence of  
midline shift or hydrocephalus. Posterior fossa structures are unremarkable. No  
extra-axial collections are seen. Mastoid air cells are well pneumatized and clear. Remote infarction in the left occipital lobe.  
   
  
 03/20/20 1018  CTA HEAD NECK W CONT Final result Impression:  IMPRESSION:   
   
Diminutive vertebrobasilar system with multifocal severe stenoses/occlusion of  
the basilar artery. There is persistent fetal circulation to the posterior  
cerebral artery on the right and on the left. Patent internal carotid artery and middle cerebral arteries on the right and on  
the left. Severe chronic microvascular ischemic change and severe cerebral atrophy as  
well. Mliss Dill Narrative:  EXAM: CTA HEAD NECK W CONT Clinical history: Altered mental status, possible CVA INDICATION: AMS ?stroke COMPARISON: 3/20/2020. CONTRAST: 100 mL of Isovue-370. TECHNIQUE:  Unenhanced  images were obtained to localize the volume for  
acquisition. Multislice helical axial CT angiography was performed from the  
aortic arch to the top of the head during uneventful rapid bolus intravenous  
contrast administration. Coronal and sagittal reformations and 3D post  
processing was performed. CT dose reduction was achieved through use of a  
standardized protocol tailored for this examination and automatic exposure  
control for dose modulation. FINDINGS:  
   
CTA NECK There is mild centrilobular emphysematous change. Aortic atherosclerotic change  
is mild. Mild atherosclerotic change of the common carotid arteries. Left  
vertebral artery is dominant.  Multilevel severe degenerative changes of the  
 cervical spine with multilevel severe canal and foraminal stenoses. Degenerative  
cord compression of the cervical spine.  
   
% of right carotid artery stenosis: 0  
% of left carotid artery stenosis: Less than 20 NASCET method was utilized for calculating stenosis. .  The cervical soft tissues are unremarkable. There are degenerative changes  
of the cervical spine. CTA HEAD Severe multifocal vertebral artery stenoses. Severe stenosis/occlusion of the  
basilar artery. Limited inflow to the posterior cerebral arteries from the  
basilar. There are bilateral persistent fetal circulation to the posterior  
cerebral arteries demonstrated. [place] [facility] Petrous and cavernous ICAs demonstrate  
atherosclerotic change without hemodynamically significant stenosis. Azygous  
origin of the A3 segments. There are small A1 segments bilaterally. M1 segments  
are patent. Mild to moderate M1 stenoses on the right. Dural venous sinuses are  
grossly patent. Confluent periventricular and scattered hypodensities in the  
cerebral white matter are extensive. Sulcal and ventricular prominence is  
extensive as well. [facility] CXR Results  (Last 48 hours) 03/20/20 1027  XR CHEST PORT Final result Impression:  IMPRESSION:  
   
Mild CHF pattern of pulmonary edema in the proper clinical setting, new since January. No lobar pneumonia or pneumothorax. Narrative:  EXAM: XR CHEST PORT INDICATION: Right extremity weakness for one day. Dementia. COMPARISON: Portable chest on 1/14/2020 and AP chest on 9/16/2019. TECHNIQUE: Semiupright portable chest AP view FINDINGS: Cardiac monitoring wires overlie the thorax. Cardiomegaly is  
unchanged. Aortic arch is atherosclerotic without aneurysm. Hypervascular  
prominence is new and mild. Reticular interstitial opacities are new and mild. No focal airspace opacity. No  
pneumothorax. Bones are osteopenic. Medical Decision Making I am the first provider for this patient. I reviewed the vital signs, available nursing notes, past medical history, past surgical history, family history and social history. Vital Signs-Reviewed the patient's vital signs. Patient Vitals for the past 12 hrs: 
 Temp Pulse Resp BP SpO2  
03/20/20 1100  99 26 168/71 94 % 03/20/20 1032  89 22  96 % 03/20/20 1031  94 22  95 % 03/20/20 1029    183/73   
03/20/20 0930  89 18 171/52 97 % 03/20/20 0902 98.9 °F (37.2 °C) 77 19 155/52 96 % 03/20/20 0900  82 18 174/63 92 % 03/20/20 0845  88 20 155/52 97 % Records Reviewed: Nursing Notes and Old Medical Records Provider Notes (Medical Decision Making):  
Patient presenting with altered mental status and concerns for right sided weakness and dysarthria. Unfortunately she is outside the TPA window as well as thrombectomy window given that it is been 24 hours or more since last known normal.  Given all this, will get CT head and possibly CTA but also lab work and urinalysis to rule out UTI, electrolyte abnormality or other infection. Could also be a hemorrhagic stroke, worsening dementia. ED Course:  
Initial assessment performed. The patients presenting problems have been discussed, and they are in agreement with the care plan formulated and outlined with them. I have encouraged them to ask questions as they arise throughout their visit. ED Course as of Mar 20 1515 Fri Mar 20, 2020  
0850 EKG interpreted by me. Shows atrial fibrillation with a heart rate around 74. No ST elevations depressions concerning for ischemia. Has a history of atrial fibrillation. [JS] 3192 Patient's son, Colton Perkins who states that this is a change in her mental status. She is on Coumadin normally. Thus will get an INR on her. Waiting on CT and urinalysis results. He states that she is a DNR. [JS] ED Course User Index [JS] Mariama Parra MD  
 
 Disposition: 
 
Admission Note: 
Patient is being admitted to the hospital by Dr. Dee Garcia, Service: Hospitalist.  The results of their tests and reasons for their admission have been discussed with them and available family. They convey agreement and understanding for the need to be admitted and for their admission diagnosis. Diagnosis Clinical Impression: 1. Acute encephalopathy 2. Late onset Alzheimer's disease without behavioral disturbance ([facility] [address] Attestations: 
Traci Mullen M.D. Please note that this dictation was completed with [employer] the [employer] voice recognition software. Quite often unanticipated grammatical, syntax, homophones, and other interpretive errors are inadvertently transcribed by the computer software. Please disregard these errors. Please excuse any errors that have escaped final proofreading. Thank you.

## 2020-03-20 NOTE — H&P
Hospitalist Admission Note NAME: Becca Edward :  [id number] MRN:  [id number] Date/Time:  3/20/2020 12:48 PM 
 
Patient PCP: Faith Son MD 
________________________________________________________________________ My assessment of this patient's clinical condition and my plan of care is as follows. Assessment / Plan: 
Acute encephalopathy likely metabolic but will rule out CVA 
-At baseline patient is semi-independent on her activities of daily living, resides in a nursing home, occasionally can feed herself and is wheelchair-bound but lately she has been more confused and has not been eating or drinking well per son 
-On exam she is able to withdraw all 4 extremities but speech appears slightly slurred 
-CTA neck shows diminished vertebrobasilar system but carotids are normal 
-Rule out metabolic causes of encephalopathy. Check ammonia level. Check vitamin B12 level. Urine analysis is within normal limits. Check TSH level. 
-Check MRI of the brain to rule out CVA. Check 2D echocardiogram to complete CVA work-up. 
-Consult PT OT speech therapy. Consult neurology. 
-Start diet if she passes swallow  
-Holding off on IV fluids due to pulmonary edema on chest x-ray 
-Start rectal aspirin. Start statin if she is able to take oral pills 
-Hold all home antihypertensives to allow for permissive hypertension History of atrial fibrillation on anticoagulation with warfarin -INR is 1.3. Currently she cannot take warfarin as she is n.p.o. 
-Start Lovenox adjusted renally. Resume warfarin when she is able to take oral medications Diabetes mellitus type 2 
-On insulin lispro 5units 3 times daily at home but currently she is n.p.o. 
-Start insulin sliding scale with blood sugar checks every 6 hourly Acute diastolic congestive heart failure exacerbation 
-Chest x-ray shows mild pulmonary edema 
-She is on Demadex 10 mg daily at home.   Currently she is n.p.o. so we will start Lasix 40 mg IV twice daily. 
-Holding home Coreg to allow for permissive hypertension History of CKD stage III 
-Baseline creatinine is around 1.5-2. Currently creatinine is 2.0. 
-Monitor creatinine daily while on Lasix. History of Alzheimer's dementia History of gouty arthritis Iron deficiency anemia Dyslipidemia History of anxiety History of GERD 
-Holding all oral medications as she is currently n.p.o. Resume when appropriate Code Status: DNR Surrogate Decision Maker: Son 
 
DVT Prophylaxis: Lovenox Baseline: Resident of nursing home, wheelchair-bound per son and semi-independent on others for activities of daily living Subjective: CHIEF COMPLAINT: confusion HISTORY OF PRESENT ILLNESS:    
Cherylynn Shone is a 80 y.o.   female who presents with past medical history of dementia, diabetes mellitus, atrial fibrillation on anticoagulation with warfarin is coming to the hospital with chief complaints of nursing home. Patient is a resident of nursing home, has dementia at baseline and is not a very good historian. Information is obtained by talking to patient's son at the bedside. According to them, patient resides in a nursing home and they have been under lockdown for close to 1 week. Patient's son reports that her mother does have dementia but at baseline she is normally alert awake and identify family members and also can feed herself at times but currently she is very drowsy, confused and is refusing feeds and is not herself. She did not specifically report any complaints but she does have chronic right-sided weakness from gout related pain and does not use her right arm frequently. Further information regarding history of presenting illness is limited secondary to her confusion. On arrival to the hospital, she was noted to have slightly high blood pressure. On lab work she was noted to have normal CBC.   CMP showed a creatinine of 2.06. LFTs were within normal limits. Chest x-ray showed mild pulmonary edema. CT showed no acute cranial process but showed severe chronic microvascular ischemic changes. CTA head showed diminished circulation in the posterior circulation. We were asked to admit for work up and evaluation of the above problems. Past Medical History:  
Diagnosis Date  Alzheimer's disease ([facility]  CHF (congestive heart failure) ([facility]  Dementia ([facility]  Diabetes ([facility] type II  
 Elevated troponin 12/11/2015  Essential hypertension  Fall as cause of accidental injury in residential institution as place of occurrence 12/11/2015  GERD (gastroesophageal reflux disease)  Heart failure ([facility]  Hyperlipidemia  Hyperlipidemia  Hypoglycemia  Ill-defined condition   
 embolism (DVT)  Paroxysmal atrial fibrillation (HCC)  Psychiatric disorder   
 anxiety Past Surgical History:  
Procedure Laterality Date  HX APPENDECTOMY Social History Tobacco Use  Smoking status: Never Smoker  Smokeless tobacco: Never Used Substance Use Topics  Alcohol use: No  
  
 
Family History No cad in the family Allergies Allergen Reactions  Influenza Virus Vaccine, Specific Hives  Penicillins Unknown (comments)  Sulfacetamide Unknown (comments) Prior to Admission medications Medication Sig Start Date End Date Taking? Authorizing Provider  
warfarin (COUMADIN) 2.5 mg tablet Take 2.5 mg by mouth two (2) days a week. Patient takes 5 mg M,W,TH,SAT, SUN and takes 2.5 mg every TUES and FRI    Provider, Historical  
albuterol-ipratropium (DUO-NEB) 2.5 mg-0.5 mg/3 ml nebu 3 mL by Nebulization route every four (4) hours as needed for Wheezing. Provider, Historical  
zinc oxide (SECURA EXTRA PROTECTIVE) 30.6 % topical cream Apply  to affected area four (4) times daily as needed for Skin Irritation (to right buttock).     Provider, Historical  
 insulin lispro (HUMALOG U-100 INSULIN) 100 unit/mL injection 5 Units by SubCUTAneous route three (3) times daily (with meals). Provider, Historical  
alendronate (FOSAMAX) 70 mg tablet Take 70 mg by mouth every seven (7) days. Provider, Historical  
pantoprazole (PROTONIX) 40 mg tablet Take 40 mg by mouth daily. Provider, Historical  
torsemide (DEMADEX) 100 mg tablet Take 100 mg by mouth daily. Provider, Historical  
acetaminophen (TYLENOL) 325 mg tablet Take 650 mg by mouth every six (6) hours as needed for Pain. Provider, Historical  
allopurinol (ZYLOPRIM) 100 mg tablet Take 100 mg by mouth daily. Provider, Historical  
amLODIPine (NORVASC) 10 mg tablet Take 10 mg by mouth daily. Provider, Historical  
warfarin (COUMADIN) 5 mg tablet Take 5 mg by mouth five (5) days a week. Patient takes 5 mg M,W,TH,SAT, SUN and takes 2.5 mg every TUES and FRI    Provider, Historical  
carvedilol (COREG) 3.125 mg tablet Take 3.125 mg by mouth two (2) times daily (with meals). 12/15/15   Maci Romano MD  
ferrous sulfate (IRON) 325 mg (65 mg iron) EC tablet Take 325 mg by mouth Before breakfast and dinner. Other, MD Doug  
benztropine (COGENTIN) 1 mg tablet Take 1 mg by mouth two (2) times a day. Provider, Historical  
memantine ER (NAMENDA XR) 28 mg capsule Take 28 mg by mouth daily. Provider, Historical  
potassium chloride (KAON 10%) 20 mEq/15 mL solution Take 20 mEq by mouth daily. Provider, Historical  
pravastatin (PRAVACHOL) 20 mg tablet Take 20 mg by mouth nightly. Provider, Historical  
 
 
REVIEW OF SYSTEMS:    
I am not able to complete the review of systems because: The patient is intubated and sedated The patient has altered mental status due to his acute medical problems The patient has baseline aphasia from prior stroke(s)  
y The patient has baseline dementia and is not reliable historian The patient is in acute medical distress and unable to provide information Total of 12 systems reviewed as follows:   
   POSITIVE= underlined text  Negative = text not underlined General:  fever, chills, sweats, generalized weakness, weight loss/gain,  
   loss of appetite Eyes:    blurred vision, eye pain, loss of vision, double vision ENT:    rhinorrhea, pharyngitis Respiratory:   cough, sputum production, SOB, BUTTS, wheezing, pleuritic pain  
Cardiology:   chest pain, palpitations, orthopnea, PND, edema, syncope Gastrointestinal:  abdominal pain , N/V, diarrhea, dysphagia, constipation, bleeding Genitourinary:  frequency, urgency, dysuria, hematuria, incontinence Muskuloskeletal :  arthralgia, myalgia, back pain Hematology:  easy bruising, nose or gum bleeding, lymphadenopathy Dermatological: rash, ulceration, pruritis, color change / jaundice Endocrine:   hot flashes or polydipsia Neurological:  headache, dizziness, confusion, focal weakness, paresthesia, Speech difficulties, memory loss, gait difficulty Psychological: Feelings of anxiety, depression, agitation Objective: VITALS:   
Visit Vitals /71 Pulse 99 Temp 98.9 °F (37.2 °C) Resp 26 Ht 5' 5\" (1.651 m) Wt 86.2 kg (190 lb) SpO2 94% BMI 31.62 kg/m² PHYSICAL EXAM: 
 
General:    no distress, appears stated age. HEENT: Atraumatic, anicteric sclerae, pink conjunctivae No oral ulcers, mucosa moist 
Neck:  Supple, symmetrical,  thyroid: non tender Lungs:   Clear to auscultation bilaterally. No Wheezing or Rhonchi. No rales. Chest wall:  No tenderness  No Accessory muscle use. Heart:   Regular  rhythm,  No  murmur   No edema Abdomen:   Soft, non-tender. Not distended. Extremities: No cyanosis. No clubbing,   
  Skin turgor normal, Capillary refill normal, Radial dial pulse 2+ Skin:     Slight erythema to left knee, superficial 
Psych:  Confused at baseline Neurologic: Drowsy but wakes up to commands, she is able to withdraw all 4 extremities to pain, further exam is limited as she is very confused 
_______________________________________________________________________ Care Plan discussed with: 
  Comments Patient y Family  y  son at bedside RN y   
Care Manager Consultant:     
_______________________________________________________________________ Expected  Disposition:  
Home with Family HH/PT/OT/RN   
SNF/LTC [facility]   
________________________________________________________________________ TOTAL TIME:  60  Minutes Critical Care Provided     Minutes non procedure based Comments  
 y Reviewed previous records  
>50% of visit spent in counseling and coordination of care y Discussion with patient and/or family and questions answered 
  
 
________________________________________________________________________ Signed: Carter Hunt MD 
 
Procedures: see electronic medical records for all procedures/Xrays and details which were not copied into this note but were reviewed prior to creation of Plan. LAB DATA REVIEWED:   
Recent Results (from the past 24 hour(s)) EKG, 12 LEAD, INITIAL Collection Time: 03/20/20  8:48 AM  
Result Value Ref Range Ventricular Rate 74 BPM  
 Atrial Rate 72 BPM  
 QRS Duration 70 ms Q-T Interval 406 ms QTC Calculation (Bezet) 450 ms Calculated R Axis -15 degrees Calculated T Axis -2 degrees Diagnosis ** Poor data quality, interpretation may be adversely affected Recommend repeat Atrial fibrillation Confirmed by Sammi Perea MD, Veronica Damico ([id number]) on 3/20/2020 11:26:10 AM 
  
CBC WITH AUTOMATED DIFF Collection Time: 03/20/20  9:04 AM  
Result Value Ref Range WBC 6.0 3.6 - 11.0 K/uL  
 RBC 4.46 3.80 - 5.20 M/uL  
 HGB 10.2 (L) 11.5 - 16.0 g/dL HCT 33.6 (L) 35.0 - 47.0 % MCV 75.3 (L) 80.0 - 99.0 FL  
 MCH 22.9 (L) 26.0 - 34.0 PG  
 MCHC 30.4 30.0 - 36.5 g/dL  
 RDW 17.1 (H) 11.5 - 14.5 % PLATELET 516 985 - 679 K/uL MPV 10.7 8.9 - 12.9 FL  
 NRBC 0.0 0  WBC ABSOLUTE NRBC 0.00 0.00 - 0.01 K/uL NEUTROPHILS 64 32 - 75 % LYMPHOCYTES 25 12 - 49 % MONOCYTES 9 5 - 13 % EOSINOPHILS 2 0 - 7 % BASOPHILS 0 0 - 1 % IMMATURE GRANULOCYTES 0 0.0 - 0.5 % ABS. NEUTROPHILS 3.8 1.8 - 8.0 K/UL  
 ABS. LYMPHOCYTES 1.5 0.8 - 3.5 K/UL  
 ABS. MONOCYTES 0.6 0.0 - 1.0 K/UL  
 ABS. EOSINOPHILS 0.1 0.0 - 0.4 K/UL  
 ABS. BASOPHILS 0.0 0.0 - 0.1 K/UL  
 ABS. IMM. GRANS. 0.0 0.00 - 0.04 K/UL  
 DF AUTOMATED METABOLIC PANEL, COMPREHENSIVE Collection Time: 03/20/20  9:04 AM  
Result Value Ref Range Sodium 142 136 - 145 mmol/L Potassium 3.6 3.5 - 5.1 mmol/L Chloride 108 97 - 108 mmol/L  
 CO2 29 21 - 32 mmol/L Anion gap 5 5 - 15 mmol/L Glucose 226 (H) 65 - 100 mg/dL BUN 42 (H) 6 - 20 MG/DL Creatinine 2.06 (H) 0.55 - 1.02 MG/DL  
 BUN/Creatinine ratio 20 12 - 20 GFR est AA 27 (L) >60 ml/min/1.73m2 GFR est non-AA 23 (L) >60 ml/min/1.73m2 Calcium 8.8 8.5 - 10.1 MG/DL Bilirubin, total 0.3 0.2 - 1.0 MG/DL  
 ALT (SGPT) 15 12 - 78 U/L  
 AST (SGOT) 7 (L) 15 - 37 U/L Alk. phosphatase 121 (H) 45 - 117 U/L Protein, total 7.5 6.4 - 8.2 g/dL Albumin 2.8 (L) 3.5 - 5.0 g/dL Globulin 4.7 (H) 2.0 - 4.0 g/dL A-G Ratio 0.6 (L) 1.1 - 2.2 CREATININE, POC Collection Time: 03/20/20  9:05 AM  
Result Value Ref Range Creatinine (POC) 1.9 (H) 0.6 - 1.3 mg/dL GFRAA, POC 30 (L) >60 ml/min/1.73m2 GFRNA, POC 25 (L) >60 ml/min/1.73m2 URINALYSIS W/ REFLEX CULTURE Collection Time: 03/20/20  9:32 AM  
Result Value Ref Range Color YELLOW/STRAW Appearance CLOUDY (A) CLEAR Specific gravity 1.014 1.003 - 1.030    
 pH (UA) 5.5 5.0 - 8.0 Protein 100 (A) NEG mg/dL Glucose 100 (A) NEG mg/dL Ketone NEGATIVE  NEG mg/dL Bilirubin NEGATIVE  NEG Blood NEGATIVE  NEG Urobilinogen 0.2 0.2 - 1.0 EU/dL  Nitrites NEGATIVE  NEG    
 Leukocyte Esterase NEGATIVE  NEG    
 WBC 0-4 0 - 4 /hpf  
 RBC 0-5 0 - 5 /hpf Epithelial cells FEW FEW /lpf Bacteria NEGATIVE  NEG /hpf  
 UA:UC IF INDICATED CULTURE NOT INDICATED BY UA RESULT CNI Hyaline cast 2-5 0 - 5 /lpf DRUG SCREEN, URINE Collection Time: 03/20/20  9:32 AM  
Result Value Ref Range AMPHETAMINES NEGATIVE  NEG    
 BARBITURATES NEGATIVE  NEG BENZODIAZEPINES NEGATIVE  NEG    
 COCAINE NEGATIVE  NEG METHADONE NEGATIVE  NEG    
 OPIATES NEGATIVE  NEG    
 PCP(PHENCYCLIDINE) NEGATIVE  NEG    
 THC (TH-CANNABINOL) NEGATIVE  NEG Drug screen comment (NOTE) PROTHROMBIN TIME + INR Collection Time: 03/20/20 10:32 AM  
Result Value Ref Range INR 1.3 (H) 0.9 - 1.1 Prothrombin time 13.5 (H) 9.0 - 11.1 sec POC EG7 Collection Time: 03/20/20 11:16 AM  
Result Value Ref Range Calcium, ionized (POC) 1.24 1.12 - 1.32 mmol/L  
 pH (POC) 7.413 7.35 - 7.45    
 pCO2 (POC) 46.0 (H) 35.0 - 45.0 MMHG  
 pO2 (POC) 69 (L) 80 - 100 MMHG  
 HCO3 (POC) 29.3 (H) 22 - 26 MMOL/L Base excess (POC) 5 mmol/L  
 sO2 (POC) 93 92 - 97 % Site LEFT RADIAL Device: ROOM AIR Allens test (POC) YES Specimen type (POC) ARTERIAL Total resp.  rate 21

## 2020-03-20 NOTE — PROGRESS NOTES
Pharmacy  Enoxaparin (Lovenox®) Monitoring Indication: Afib Current Dose: Enoxaparin 80 mg subcutaneously every 24 hours Creatinine Clearance (mL/min): 18.9 Current Weight: 86.2 Kg Labs: 
Recent Labs  
  03/20/20 
1032 03/20/20 
0904 CREA  --  2.06* HGB  --  10.2* PLT  --  191 INR 1.3*  -- Wt Readings from Last 1 Encounters:  
03/20/20 86.2 kg (190 lb) Ht Readings from Last 1 Encounters:  
03/20/20 165.1 cm (65\") Impression/Plan:  
Due to weight of 86.2 kg, will adjust enoxaparin dose to 90 mg SQ and continue current frequency  of q24h due to CrCl < 30 mL/min ThanksBridgette 
 
  [email or web address]%20Dose%20Adjustment%20protocol. pdf

## 2020-03-20 NOTE — ED NOTES
PT to MRI before she goes to floor. Pt had hip replacement prior but MRI ok with replacement metal.

## 2020-03-21 NOTE — PROGRESS NOTES
Problem: Self Care Deficits Care Plan (Adult) Goal: *Acute Goals and Plan of Care (Insert Text) Description:  
FUNCTIONAL STATUS PRIOR TO ADMISSION: Patient required moderate to Maximal assistance for basic and instrumental ADLs. HOME SUPPORT: Lives at [facility] and mostly uses wheelchair. Does very little if any walking. Occupational Therapy Goals Initiated 3/21/2020 1. Patient will perform self-feeding with minimal assistance/contact guard assist within 7 day(s). 2.  Patient will perform grooming with minimal assistance/contact guard assist within 7 day(s). 3.  Patient will perform upper body dressing with minimal assistance/contact guard assist within 7 day(s). 4.  Patient will perform toilet transfers with moderate assistance  within 7 day(s). 5.  Patient will perform all aspects of toileting with moderate assistance  within 7 day(s). Outcome: Progressing Towards Goal 
 
OCCUPATIONAL THERAPY EVALUATION Patient: Nica Mix (64 y.o. female) Date: 3/21/2020 Primary Diagnosis: Confusion [R41.0] Precautions: fall ASSESSMENT Based on the objective data described below, the patient presents with general weakness, confusion with history of dementia. Has arthritis and appears to be developing contracture in R hand. Needed Max A to get to side of bed and Mod A x 2 to stand. Unable to take side steps. Recommend she return to LTC facility where she can have a skilled trial of rehab to get back to established baseline and decreased burden of care. Current Level of Function Impacting Discharge (ADLs/self-care): up to total A Functional Outcome Measure: The patient scored Total: 5/100 on the Barthel Index outcome measure which is indicative of 95% impaired ability to care for basic self needs/dependency on others. Other factors to consider for discharge: none Patient will benefit from skilled therapy intervention to address the above noted impairments.     
 
PLAN : 
 Recommendations and Planned Interventions: self care training, functional mobility training, therapeutic exercise, balance training, therapeutic activities, patient education, home safety training, and family training/education Frequency/Duration: Patient will be followed by occupational therapy 3 times a week to address goals. Recommendation for discharge: (in order for the patient to meet his/her long term goals) Therapy up to 5 days/week in SNF setting This discharge recommendation: A follow-up discussion with the attending provider and/or case management is planned IF patient discharges home will need the following DME: patient owns DME required for discharge SUBJECTIVE:  
Patient stated I want to lay down.  OBJECTIVE DATA SUMMARY:  
HISTORY:  
Past Medical History:  
Diagnosis Date Alzheimer's disease ([facility] CHF (congestive heart failure) ([facility] Dementia ([facility] Diabetes ([facility] type II Elevated troponin 12/11/2015 Essential hypertension Fall as cause of accidental injury in residential institution as place of occurrence 12/11/2015 GERD (gastroesophageal reflux disease) Heart failure ([facility] Hyperlipidemia Hyperlipidemia Hypoglycemia Ill-defined condition   
 embolism (DVT) Paroxysmal atrial fibrillation (HCC) Psychiatric disorder   
 anxiety Past Surgical History:  
Procedure Laterality Date HX APPENDECTOMY Expanded or extensive additional review of patient history:  
 
  
 
Hand dominance: Right (per patient but based on appearance of hand must be using Left) EXAMINATION OF PERFORMANCE DEFICITS: 
Cognitive/Behavioral Status: 
Neurologic State: Alert Orientation Level: Oriented to person;Disoriented to place; Disoriented to situation;Disoriented to time Cognition: Impaired decision making; Impulsive;Poor safety awareness;Decreased attention/concentration Perception: Appears intact Skin: intact Edema: none Hearing: Auditory Auditory Impairment: Hard of hearing, bilateral 
 
Vision/Perceptual:   
    
    
    
  
    
Acuity: Impaired near vision; Impaired far vision Range of Motion: 
 
AROM: Generally decreased, functional(developing R PIP contractures) PROM: Generally decreased, functional 
  
  
  
  
  
  
 
Strength: 
 
Strength: Generally decreased, functional 
  
  
  
  
 
Coordination: 
Coordination: Generally decreased, functional 
Fine Motor Skills-Upper: Left Intact; Right Impaired Gross Motor Skills-Upper: Left Intact; Right Impaired Tone & Sensation: 
 
Tone: Normal 
Sensation: Intact Balance: 
Sitting: Impaired Sitting - Static: Good (unsupported) Sitting - Dynamic: Fair (occasional) Standing: Impaired Standing - Static: Constant support; Fair 
Standing - Dynamic : Poor;Constant support Functional Mobility and Transfers for ADLs: 
Bed Mobility: 
Rolling: Moderate assistance Supine to Sit: Maximum assistance Sit to Supine: Maximum assistance Scooting: Minimum assistance Transfers: 
Sit to Stand: Moderate assistance;Assist x2 Stand to Sit: Moderate assistance;Assist x2 ADL Assessment: 
Feeding: Moderate assistance Oral Facial Hygiene/Grooming: Maximum assistance Bathing: Total assistance Upper Body Dressing: Moderate assistance Lower Body Dressing: Maximum assistance Toileting: Total assistance ADL Intervention and task modifications: 
  
 
  
 
  
 
  
 
  
 
  
 
  
 
  
  
Functional Measure: 
Barthel Index: 
 
Bathin Bladder: 0 Bowels: 0 Groomin Dressin Feedin Mobility: 0 Stairs: 0 Toilet Use: 0 Transfer (Bed to Chair and Back): 5 Total: 5/100 The Barthel ADL Index: Guidelines 1. The index should be used as a record of what a patient does, not as a record of what a patient could do.  
2. The main aim is to establish degree of independence from any help, physical or verbal, however minor and for whatever reason. 3. The need for supervision renders the patient not independent. 4. A patient's performance should be established using the best available evidence. Asking the patient, friends/relatives and nurses are the usual sources, but direct observation and common sense are also important. However direct testing is not needed. 5. Usually the patient's performance over the preceding 24-48 hours is important, but occasionally longer periods will be relevant. 6. Middle categories imply that the patient supplies over 50 per cent of the effort. 7. Use of aids to be independent is allowed. Fareed Escobar., Barthel, DMILIND. ([id number]). Functional evaluation: the Barthel Index. [address] [facility] (14)2. Marya Velasco cornell DAO Wolfe, Suly Wooten., Cathie Strauss., Jesi, [address] (1999). Measuring the change indisability after inpatient rehabilitation; comparison of the responsiveness of the Barthel Index and Functional [place] Measure. Journal of Neurology, Neurosurgery, and Psychiatry, 66(4), 117-074. Humaira Redmond, N.[place].A, YESSENIA Hassan, & Martha Blunt M.A. (2004.) Assessment of post-stroke quality of life in cost-effectiveness studies: The usefulness of the Barthel Index and the EuroQoL-5D. [facility], 13, 227-62 Occupational Therapy Evaluation Charge Determination History Examination Decision-Making LOW Complexity : Brief history review  LOW Complexity : 1-3 performance deficits relating to physical, cognitive , or psychosocial skils that result in activity limitations and / or participation restrictions  LOW Complexity : No comorbidities that affect functional and no verbal or physical assistance needed to complete eval tasks Based on the above components, the patient evaluation is determined to be of the following complexity level: LOW Pain Rating: 
None rated Activity Tolerance:  
Fair Please refer to the flowsheet for vital signs taken during this treatment. After treatment patient left in no apparent distress:   
Supine in bed COMMUNICATION/EDUCATION:  
The patients plan of care was discussed with: Physical therapist and Registered nurse. Home safety education was provided and the patient/caregiver indicated understanding., Patient/family have participated as able in goal setting and plan of care. , and Patient/family agree to work toward stated goals and plan of care. This patients plan of care is appropriate for delegation to [facility]. Thank you for this referral. 
Betty Barboza Time Calculation: 20 mins

## 2020-03-21 NOTE — CONSULTS
IP CONSULT TO NEUROLOGY Consult performed by: Zaida Vital MD 
Consult ordered by: Abraham Khoury MD 
 
 
 
   
NEUROLOGY CONSULT 
 
NAME Lori Bronson AGE 80 y.o. MRN [id number]  1927 REQUESTING PHYSICIAN: Xiomara Knight MD   
 
CHIEF COMPLAINT: Altered mental status This is a 80 y.o. female with medical history of Alzheimer's disease admitted from a nursing home for right sided weakness and confusion. Since the admission her cognition has improved to baseline. Her son is with her in the room states that she was conversive and interactive and suddenly became obtunded yesterday. Only changes in medications was restarting Lantus. She states she had a recent hip replacement but was not on any analgesics. Patient herself has no contributing history to relay. ASSESSMENT AND PLAN 1. Altered mental status Resolved May be related to gouty flare up Labs were reviewed Patient is currently at her baseline Continue telemetry monitoring for 48 hours 2. Late onset Alzheimer's disease without behavioral disturbance ([facility] [address] [facility] In the nursing home 
conitnue memantine 3. Hyperlipidemia Continue atorvastatin 4. Diabetes On insulin 5. Gouty arthritis Painful right hand ALLERGIES: 
Influenza virus vaccine, specific; Penicillins; and Sulfacetamide Current Facility-Administered Medications Medication Dose Route Frequency  albuterol-ipratropium (DUO-NEB) 2.5 MG-0.5 MG/3 ML  3 mL Nebulization Q4H PRN  
 benztropine (COGENTIN) tablet 1 mg  1 mg Oral BID  acetaminophen (TYLENOL) tablet 650 mg  650 mg Oral Q4H PRN Or  
 acetaminophen (TYLENOL) solution 650 mg  650 mg Per NG tube Q4H PRN Or  
 acetaminophen (TYLENOL) suppository 650 mg  650 mg Rectal Q4H PRN  
 aspirin (ASA) suppository 300 mg  300 mg Rectal DAILY  atorvastatin (LIPITOR) tablet 40 mg  40 mg Oral QHS  labetaloL (NORMODYNE;TRANDATE) injection 5 mg  5 mg IntraVENous Q10MIN PRN  
 furosemide (LASIX) injection 40 mg  40 mg IntraVENous BID  insulin lispro (HUMALOG) injection   SubCUTAneous Q6H  
 glucose chewable tablet 16 g  4 Tab Oral PRN  
 dextrose (D50W) injection syrg 12.5-25 g  12.5-25 g IntraVENous PRN  
 glucagon (GLUCAGEN) injection 1 mg  1 mg IntraMUSCular PRN  
 enoxaparin (LOVENOX) injection 90 mg +++partial+++  90 mg SubCUTAneous Q24H Past Medical History:  
Diagnosis Date  Alzheimer's disease ([facility]  CHF (congestive heart failure) ([facility]  Dementia ([facility]  Diabetes ([facility] type II  
 Elevated troponin 12/11/2015  Essential hypertension  Fall as cause of accidental injury in residential institution as place of occurrence 12/11/2015  GERD (gastroesophageal reflux disease)  Heart failure ([facility]  Hyperlipidemia  Hyperlipidemia  Hypoglycemia  Ill-defined condition   
 embolism (DVT)  Paroxysmal atrial fibrillation (HCC)  Psychiatric disorder   
 anxiety Social History Tobacco Use  Smoking status: Never Smoker  Smokeless tobacco: Never Used Substance Use Topics  Alcohol use: No  
 
 
Family History No Greenbrier's disease Son Gout Visit Vitals /74 Pulse 69 Temp 97.5 °F (36.4 °C) Resp 18 Ht 5' 5\" (1.651 m) Wt 190 lb (86.2 kg) SpO2 97% BMI 31.62 kg/m² General: Well developed, well nourished. Patient in no apparent distress Head: Normocephalic, atraumatic, anicteric sclera Neck Normal ROM, No thyromegally Lungs:  Clear to auscultation Cardiac: Regular rate and rhythm Abd: Bowel sounds were audible. No tenderness on palpation Ext: No pedal edema. Arthritic right hand will not move it because of pain Skin: Supple no rash NeurologicExam: 
Mental Status: Alert and oriented to person only Knows she is in the hospital  
Speech: Fluent no aphasia or dysarthria. Cranial Nerves:  II - XII Intact Motor:  Full and symmetric strength of upper and lower proximal and distal muscles. Normal bulk and tone. Reflexes:   Deep tendon reflexes 1+/4 and symmetric. Sensory:   Symmetric distal reduction in sensory perception affecting all modalities. Gait:  deferred Tremor:   No tremor noted. Cerebellar:  Coordination intact. Neurovascular: No carotid bruits. No JVD REVIEWED IMAGING: 
 
MRI : 
Results from Hospital Encounter encounter on 03/20/20 MRI BRAIN WO CONT Narrative EXAM: MRI BRAIN WO CONT INDICATION: cva. Confusion. COMPARISON: CTA head neck 3/20/2020. CONTRAST: None. TECHNIQUE:   
Multiplanar multisequence acquisition without contrast of the brain. FINDINGS: 
Generalized parenchymal volume loss with commensurate dilation of the sulci and 
ventricular system. There is severe disproportionate atrophy of the bilateral 
hippocampi and mesial temporal lobes. Confluent periventricular deep white 
matter T2/FLAIR hyperintensities, and patchy T2/FLAIR hyperintensity in the 
nicko, consistent with severe chronic microvascular ischemic disease. There is no 
acute infarct, hemorrhage, extra-axial fluid collection, or mass effect. There 
is no cerebellar tonsillar herniation. Expected arterial flow-voids are present. The paranasal sinuses, mastoid air cells, and middle ears are clear. The orbital 
contents are within normal limits. No significant osseous or scalp lesions are 
identified. Degenerative changes throughout the cervical spine with severe right 
facet arthropathy at C1-C2. Impression IMPRESSION:  
 
1. No acute intracranial abnormality. 2. Generalized parenchymal volume loss with severe disproportionate atrophy of 
the bilateral hippocampi and mesial temporal lobes, favored to represent Alzheimer's dementia. 3. Severe chronic microvascular ischemic disease. CT: 
Results from Hospital Encounter encounter on 03/20/20 CTA HEAD NECK W CONT Narrative EXAM: CTA HEAD NECK W CONT Clinical history: Altered mental status, possible CVA INDICATION: AMS ?stroke COMPARISON: 3/20/2020. CONTRAST: 100 mL of Isovue-370. TECHNIQUE:  Unenhanced  images were obtained to localize the volume for 
acquisition. Multislice helical axial CT angiography was performed from the 
aortic arch to the top of the head during uneventful rapid bolus intravenous 
contrast administration. Coronal and sagittal reformations and 3D post 
processing was performed. CT dose reduction was achieved through use of a 
standardized protocol tailored for this examination and automatic exposure 
control for dose modulation. FINDINGS: 
 
CTA NECK There is mild centrilobular emphysematous change. Aortic atherosclerotic change 
is mild. Mild atherosclerotic change of the common carotid arteries. Left 
vertebral artery is dominant. Multilevel severe degenerative changes of the 
cervical spine with multilevel severe canal and foraminal stenoses. Degenerative 
cord compression of the cervical spine. 
 
% of right carotid artery stenosis: 0 
% of left carotid artery stenosis: Less than 20 NASCET method was utilized for calculating stenosis. .  The cervical soft tissues are unremarkable. There are degenerative changes 
of the cervical spine. CTA HEAD Severe multifocal vertebral artery stenoses. Severe stenosis/occlusion of the 
basilar artery. Limited inflow to the posterior cerebral arteries from the 
basilar. There are bilateral persistent fetal circulation to the posterior 
cerebral arteries demonstrated. Sadaf Rm Petrous and cavernous ICAs demonstrate 
atherosclerotic change without hemodynamically significant stenosis. Azygous 
origin of the A3 segments. There are small A1 segments bilaterally. M1 segments 
are patent. Mild to moderate M1 stenoses on the right. Dural venous sinuses are 
grossly patent.  Confluent periventricular and scattered hypodensities in the 
 cerebral white matter are extensive. Sulcal and ventricular prominence is 
extensive as well. Jennifer Christine Impression IMPRESSION:  
 
Diminutive vertebrobasilar system with multifocal severe stenoses/occlusion of 
the basilar artery. There is persistent fetal circulation to the posterior 
cerebral artery on the right and on the left. Patent internal carotid artery and middle cerebral arteries on the right and on 
the left. Severe chronic microvascular ischemic change and severe cerebral atrophy as 
well. Jennifer Christine REVIEWED LABS: 
Lab Results Component Value Date/Time WBC 6.0 03/20/2020 09:04 AM  
 HCT 33.6 (L) 03/20/2020 09:04 AM  
 HGB 10.2 (L) 03/20/2020 09:04 AM  
 PLATELET 645 07/81/0445 09:04 AM  
 
Lab Results Component Value Date/Time Sodium 146 (H) 03/21/2020 05:28 AM  
 Potassium 3.0 (L) 03/21/2020 05:28 AM  
 Chloride 110 (H) 03/21/2020 05:28 AM  
 CO2 32 03/21/2020 05:28 AM  
 Glucose 131 (H) 03/21/2020 05:28 AM  
 BUN 29 (H) 03/21/2020 05:28 AM  
 Creatinine 1.44 (H) 03/21/2020 05:28 AM  
 Calcium 9.1 03/21/2020 05:28 AM  
 
Lab Results Component Value Date/Time Vitamin B12 429 03/21/2020 05:28 AM  
 
Lab Results Component Value Date/Time LDL, calculated 82.2 03/21/2020 05:28 AM  
 
Lab Results Component Value Date/Time Hemoglobin A1c 7.4 (H) 01/14/2020 04:42 AM  
 Hemoglobin A1c, External 7.4 01/14/2016 06:40 AM  
 
No components found for: [facility] No results found for: FRANCESCA

## 2020-03-21 NOTE — PROGRESS NOTES
Reason for Admission: Confusion RUR Score: 23% PCP: First and Last name: Yessenia Aguilar Name of Practice: [id number] [facility] Are you a current patient: Yes/No: Yes Approximate date of last visit: Unknown Do you (patient/family) have any concerns for transition/discharge? The patient is a LTC resident at [facility]. She has lived at this facility for about 6 years. She has 2 sons that live locally. She has 1 son that lives in [place], [place]; 1 daughter in [place], [place]; and 2 daughters in [place]. Her children are very supportive. Her son, Clarisa Rothman (cell phone: [phone]), lives locally and is the primary contact for the patient. Plan for utilizing home health: N/A, the patient is a LTC resident at [facility] Current Advanced Directive/Advance Care Plan: The patient is a DNR and she has a MPOA/AD on-file Transition of Care Plan:      
CM spoke with the patient's son at via phone to discuss dispo plan. The patient is a LTC resident at [facility]. She has lived at this facility for 6 years. She has 1 son, Clarisa Rothman (cell phone: [phone]), that lives locally and is the primary point of contact. She has 2 other sons and 3 daughters that are also very supportive. At baseline, she is able to recognize family and is able to feed herself. The nursing staff at [facility] assists her with bathing, dressing, and toileting. She uses a manual wheelchair for mobility. The nursing staff at the facility manage her medications. The patient's family has been pleased with her care at [facility] and she will likely return to this facility upon d/c. MELVIN has sent the patient's clinical information to [facility] via [employer]. Care Management Interventions PCP Verified by CM: Yes Mode of Transport at Discharge: [facility] 
 Transition of Care Consult (CM Consult): Discharge Planning Gabinohart Signup: No 
Discharge Durable Medical Equipment: No 
Physical Therapy Consult: Yes Occupational Therapy Consult: Yes Speech Therapy Consult: Yes Current Support Network: Nursing Facility(The patient is a LTC resident at [facility] ) Confirm Follow Up Transport: Wheelchair Rachel Moore [employer] Information Provided?: No 
Discharge Location Discharge Placement: [facility] Kateryna Starr LCSW

## 2020-03-21 NOTE — PROGRESS NOTES
Pharmacy  Enoxaparin (Lovenox®) Monitoring Warfarin Pharmacist Dosing Indication: Afib Creatinine Clearance (mL/min): Estimated Creatinine Clearance: 27 mL/min (A) (based on SCr of 1.44 mg/dL (H)). Estimated Creatinine Clearance (using IBW):22.4 mL/min Current Weight: 86.2 Kg Labs: 
Recent Labs  
  03/21/20 
0528 03/20/20 
1032 03/20/20 
9905 CREA 1.44*  --  2.06* HGB  --   --  10.2* PLT  --   --  191 INR  --  1.3*  -- Wt Readings from Last 1 Encounters:  
03/21/20 86.2 kg (190 lb) Ht Readings from Last 1 Encounters:  
03/21/20 165.1 cm (65\") Diet:  full liquid diet Impression/Plan:  
Continue lovenox regimen - last dose 3/21 Warfarin 5mg 3/21 Daily INR Thank you, 
Zane Goodman, [facility]

## 2020-03-21 NOTE — PROGRESS NOTES
Hospitalist Progress Note NAME: Lubna Oconnell :  1927 MRN:  [id number] Admit date: 3/20/2020 Today's date: 20 PCP: [place] Elizabeth Gee M.D. Cell [phone] Assessment / Plan: 
Acute encephalopathy POA Baseline dementia Semi-independent on her activities of daily living, resides in a nursing home,       Occasionally feeds herself and is wheelchair-bound CTA neck shows diminished vertebrobasilar system but carotids are normal 
normal ammonia, B12 level, TSH Urinanalysis is within normal limits MRI of the brain with atrophy, no CVA 
2D echocardiogram LVEF 55 to 60% Neuro consult reviewed Advance diet ASA Overall improved, hopefully discharge in AM 
  
History of atrial fibrillation on anticoagulation with warfarin -INR is 1.3.  
-Resume warfarin, no CVA, stop lovenox bridge after today 
  
Diabetes mellitus type 2 
-Hold home insulin 
-Start insulin sliding scale with blood sugar checks qAC and qHS 
  
Acute diastolic congestive heart failure exacerbation 
-Chest x-ray shows ? mild pulmonary edema 
-looks dry, hold further IV lasix 
-Check pBNP 
- ? Resume demedex in AM 
-resume coreg 
  
History of CKD stage III 
-Baseline creatinine is around 1.5-2. Currently creatinine is 2.0. 
-Monitor creatinine daily, improving 
  
History of Alzheimer's dementia History of gouty arthritis Iron deficiency anemia Dyslipidemia History of anxiety History of GERD 
- start to resume all oral meds. Resume when appropriate Obesity POA Body mass index is 31.62 kg/m². Code Status: DNR Surrogate Decision Maker: Son 
  
DVT Prophylaxis: Lovenox 
  
Baseline: Resident of nursing home, wheelchair-bound per son and semi-independent on others for activities of daily Subjective: Chief Complaint / Reason for Physician Visit \"no problems\". Discussed with RN events overnight. No complaints, awake, alert and talking MRI negative for stroke Review of Systems: 
Symptom Y/N Comments  Symptom Y/N Comments Fever/Chills n   Chest Pain n   
Poor Appetite    Edema Cough n   Abdominal Pain n   
Sputum    Joint Pain SOB/BUTTS n   Headache Nausea/vomit n   Tolerating PT/OT Diarrhea n   Tolerating Diet y Constipation    Other Could NOT obtain due to:   
 
Objective: VITALS:  
Last 24hrs VS reviewed since prior progress note. Most recent are: 
Patient Vitals for the past 24 hrs: 
 Temp Pulse Resp BP SpO2  
03/21/20 1524 97.5 °F (36.4 °C) (!) 108 18 128/56 96 % 03/21/20 1139 96.7 °F (35.9 °C) (!) 109 18 173/87 97 % 03/21/20 0950    163/74   
03/21/20 0913 97.5 °F (36.4 °C) 69 18 163/74 97 % 03/21/20 0428 97.9 °F (36.6 °C) 66 18 138/58 97 % 03/20/20 2257 97.7 °F (36.5 °C) (!) 107 20 136/65 96 % 03/20/20 1942 98.6 °F (37 °C) 83 20 158/66 93 % Intake/Output Summary (Last 24 hours) at 3/21/2020 1558 Last data filed at 3/21/2020 6344 Gross per 24 hour Intake  Output 600 ml Net -600 ml Wt Readings from Last 12 Encounters:  
03/21/20 86.2 kg (190 lb) 01/16/20 86.7 kg (191 lb 1.6 oz)  
11/22/19 88.9 kg (196 lb) 10/25/19 88.9 kg (196 lb)  
09/19/19 89 kg (196 lb 3.4 oz) 01/23/17 85.4 kg (188 lb 4.8 oz) 12/11/15 90.7 kg (200 lb) 08/28/14 86 kg (189 lb 9.6 oz) 08/07/14 96.1 kg (211 lb 12.8 oz) PHYSICAL EXAM: 
General: WD, WN. Alert, cooperative, no acute distress EENT:  PERRL. Anicteric sclerae. Dry MM Neck:  No meningismus, no thyromegaly Resp:  CTA bilaterally, no wheezing or rales. No accessory muscle use CV:  Regular  rhythm,  No edema GI:  Soft, Non distended, Non tender. +Bowel sounds, no rebound Neurologic:  Alert, talking, slow, but oriented x 2, normal speech, non focal motor exam 
Psych:   Not anxious nor agitated Skin:  No rashes. No jaundice Reviewed most current lab test results and cultures  YES 
 Reviewed most current radiology test results   YES Review and summation of old records today    NO Reviewed patient's current orders and MAR    YES 
PMH/SH reviewed - no change compared to H&P 
________________________________________________________________________ Care Plan discussed with: 
  Comments Patient x Family  x   
RN x Care Manager Consultant Multidiciplinary team rounds were held today with , nursing, pharmacist and clinical coordinator. Patient's plan of care was discussed; medications were reviewed and discharge planning was addressed. ________________________________________________________________________ Comments >50% of visit spent in counseling and coordination of care    
________________________________________________________________________ Marquita Burk MD  
 
Procedures: see electronic medical records for all procedures/Xrays and details which were not copied into this note but were reviewed prior to creation of Plan. LABS: 
I reviewed today's most current labs and imaging studies. Pertinent labs include: 
Recent Labs  
  03/20/20 
0904 WBC 6.0 HGB 10.2* HCT 33.6*  
 Recent Labs  
  03/21/20 
0528 03/20/20 
1032 03/20/20 
3760 *  --  142  
K 3.0*  --  3.6 *  --  108 CO2 32  --  29 *  --  226* BUN 29*  --  42* CREA 1.44*  --  2.06* CA 9.1  --  8.8 ALB  --   --  2.8* TBILI  --   --  0.3 SGOT  --   --  7* ALT  --   --  15 INR  --  1.3*  --

## 2020-03-21 NOTE — PROGRESS NOTES
Problem: Mobility Impaired (Adult and Pediatric) Goal: *Acute Goals and Plan of Care (Insert Text) Description: FUNCTIONAL STATUS PRIOR TO ADMISSION:Pt is a long term resident at [facility]. She mainly uses w/c for mobility. OT spoke with son who states that she could take a few steps for transfers with assist. She is assist of staff for ADLs HOME SUPPORT PRIOR TO ADMISSION: LTC Physical Therapy Goals Initiated 3/21/2020 1. Patient will move from supine to sit and sit to supine , scoot up and down and roll side to side in bed with minimal assistance/contact guard assist within 7 day(s). 2.  Patient will transfer from bed to chair and chair to bed with moderate assistance of 1 only  using the least restrictive device within 7 day(s). 3.  Patient will perform sit to stand with moderate assistance of 1 only within 7 day(s). 4.  Patient will ambulate with moderate assistance of 1 and min A of 1 for 8 feet for mobility for transfers with the least restrictive device within 7 day(s). Outcome: Progressing Towards Goal 
 PHYSICAL THERAPY EVALUATION Patient: Venice Devi (64 y.o. female) Date: 3/21/2020 Primary Diagnosis: Confusion [R41.0] Precautions: fall, alarm ASSESSMENT Based on the objective data described below, the patient presents with limitations in her mobility that are likely nearing baseline. MRI neg for CVA, acute changes. Current Level of Function Impacting Discharge (mobility/balance): Pt is more alert, cooperative and interactive this afternoon. She is pleasant and answers in simple conversation. She is able to complete bed mobility with min A. She comes to stand with mod A of 2 and repeats with consistency. She was able to take turning steps with mod A of 2 to bedside chair. She was then able to amb with mod A of 2 HHA with support around trunk given extra processing time for 4 feet away and back to chair.  Pt able to scoot in chair with min A. She expressed comfort sitting in chiar. She was able to wipe hands and face when given cloth and follow commands for active ankle ROM. Pt left with alarm in chair and call bell in reach. Functional Outcome Measure: The patient scored 5/100 on the barthel outcome measure which is indicative of 95% impairment. Other factors to consider for discharge: lives in [address] [place] [address] Patient will benefit from skilled therapy intervention to address the above noted impairments. PLAN : 
Recommendations and Planned Interventions: bed mobility training, transfer training, gait training, therapeutic exercises, patient and family training/education, and therapeutic activities Frequency/Duration: Patient will be followed by physical therapy:  3 times a week to address goals. Recommendation for discharge: (in order for the patient to meet his/her long term goals) To be determined: Follow up at SNF for therapy screening to determine if any decline from her baseline there warranting therapy at the facility This discharge recommendation: 
Has not yet been discussed the attending provider and/or case management IF patient discharges home will need the following DME: none SUBJECTIVE:  
Patient stated Valma Havers you sincerely.  OBJECTIVE DATA SUMMARY:  
HISTORY:   
Past Medical History:  
Diagnosis Date Alzheimer's disease ([facility] CHF (congestive heart failure) ([facility] Dementia ([facility] Diabetes ([facility] type II Elevated troponin 12/11/2015 Essential hypertension Fall as cause of accidental injury in residential institution as place of occurrence 12/11/2015 GERD (gastroesophageal reflux disease) Heart failure ([facility] Hyperlipidemia Hyperlipidemia Hypoglycemia Ill-defined condition   
 embolism (DVT) Paroxysmal atrial fibrillation (HCC) Psychiatric disorder   
 anxiety Past Surgical History:  
Procedure Laterality Date HX APPENDECTOMY Home Situation Home Environment: Skilled nursing facility Support Systems: Skilled nursing facility Patient Expects to be Discharged to[de-identified] Skilled nursing facility Current DME Used/Available at Home: Wheelchair EXAMINATION/PRESENTATION/DECISION MAKING:  
Critical Behavior: 
Neurologic State: Alert Orientation Level: Oriented to person Cognition: Follows commands(with goal directed request and simple task) Hearing: Auditory Auditory Impairment: Hard of hearing, bilateral 
 
Range Of Motion: 
AROM: Generally decreased, functional(developing R PIP contractures) PROM: Generally decreased, functional 
  
  
  
Strength:   
Strength: Generally decreased, functional 
  
  
  
  
  
  
Tone & Sensation:  
Tone: Normal 
  
  
  
  
Sensation: Intact Coordination: 
Coordination: Generally decreased, functional 
Vision:  
Acuity: Impaired near vision; Impaired far vision Functional Mobility: 
Bed Mobility: 
Rolling: Minimum assistance Supine to Sit: Minimum assistance Sit to Supine: Maximum assistance Scooting: Minimum assistance Transfers: 
Sit to Stand: Moderate assistance;Assist x2 Stand to Sit: Moderate assistance;Assist x2 Bed to Chair: Moderate assistance;Assist x2; Other (comment); Additional time(HHA of 2 with support around trunk, extra time) Balance:  
Sitting: Impaired Sitting - Static: Good (unsupported) Sitting - Dynamic: Fair (occasional) Standing: Impaired Standing - Static: Constant support; Fair 
Standing - Dynamic : Constant support; Lore Gupta Ambulation/Gait Training: 
Distance (ft): 8 Feet (ft) Assistive Device: Other (comment)(HHA of 2 with trunk support) Ambulation - Level of Assistance: Moderate assistance;Assist x2 Gait Abnormalities: Decreased step clearance;Trunk sway increased; Shuffling gait Base of Support: Narrowed Speed/Yandy: Slow;Pace decreased (<100 feet/min) Step Length: Right shortened;Left shortened Functional Measure: 
Barthel Index: 
 
Bathin Bladder: 0 Bowels: 0 Groomin Dressin Feedin Mobility: 0 Stairs: 0 Toilet Use: 0 Transfer (Bed to Chair and Back): 5 Total: 5/100 The Barthel ADL Index: Guidelines 1. The index should be used as a record of what a patient does, not as a record of what a patient could do. 2. The main aim is to establish degree of independence from any help, physical or verbal, however minor and for whatever reason. 3. The need for supervision renders the patient not independent. 4. A patient's performance should be established using the best available evidence. Asking the patient, friends/relatives and nurses are the usual sources, but direct observation and common sense are also important. However direct testing is not needed. 5. Usually the patient's performance over the preceding 24-48 hours is important, but occasionally longer periods will be relevant. 6. Middle categories imply that the patient supplies over 50 per cent of the effort. 7. Use of aids to be independent is allowed. Camille Hahn., Barthel, D.W. (0307). Functional evaluation: the Barthel Index. [facility] (14)2. DAO Murillo, Pita Dupree., Krystian Barrera, Jesi, [facility] (). Measuring the change indisability after inpatient rehabilitation; comparison of the responsiveness of the Barthel Index and Functional [place] Measure. Journal of Neurology, Neurosurgery, and Psychiatry, 66(4), 173-700. Adrian Mendieta, N.J.A, Fausto Knott  W.J.FRANCISCO, & Yenny Diallo M.A. (2004.) Assessment of post-stroke quality of life in cost-effectiveness studies: The usefulness of the Barthel Index and the EuroQoL-5D. [facility], 13, 755-24 Physical Therapy Evaluation Charge Determination History Examination Presentation Decision-Making HIGH Complexity :3+ comorbidities / personal factors will impact the outcome/ POC  MEDIUM Complexity : 3 Standardized tests and measures addressing body structure, function, activity limitation and / or participation in recreation  LOW Complexity : Stable, uncomplicated  LOW Complexity : FOTO score of  Based on the above components, the patient evaluation is determined to be of the following complexity level: LOW Pain Rating: 
Pt does c/o some pain when attempting any extension movement of R fingers for hygiene Activity Tolerance:  
Good Please refer to the flowsheet for vital signs taken during this treatment. After treatment patient left in no apparent distress:  
Sitting in chair, Call bell within reach, and Bed / chair alarm activated COMMUNICATION/EDUCATION:  
The patients plan of care was discussed with: Registered nurse. Patient is unable to participate in goal setting and plan of care. Thank you for this referral. 
Gareth Euceda, PT Time Calculation: 22 mins

## 2020-03-22 NOTE — PROGRESS NOTES
Chief Complaint: altered mental status Laying in bed asleep. Easily awakened. Son is not in room. No complaints. Assesment and Plan 1. Altered mental status Resolved May be related to gouty flare up Labs were reviewed Patient is currently at her baseline Telemetry monitor suggestive of atrial fibrillation  
  
2. Late onset Alzheimer's disease without behavioral disturbance ([facility] In the nursing home 
bobby memantine 
  
3. Hyperlipidemia Continue atorvastatin 
  
4. Diabetes On insulin 
  
5.  Gouty arthritis Painful right hand 6 Atrial fibrillation Continue coumadin Allergies Influenza virus vaccine, specific; Penicillins; and Sulfacetamide Medications Current Facility-Administered Medications Medication Dose Route Frequency  warfarin (COUMADIN) tablet 5 mg  5 mg Oral ONCE  
 aspirin chewable tablet 81 mg  81 mg Oral DAILY  carvediloL (COREG) tablet 3.125 mg  3.125 mg Oral BID WITH MEALS  
 torsemide (DEMADEX) tablet 20 mg  20 mg Oral DAILY  WARFARIN INFORMATION NOTE (COUMADIN)   Other QPM  
 albuterol-ipratropium (DUO-NEB) 2.5 MG-0.5 MG/3 ML  3 mL Nebulization Q4H PRN  
 benztropine (COGENTIN) tablet 1 mg  1 mg Oral BID  acetaminophen (TYLENOL) tablet 650 mg  650 mg Oral Q4H PRN Or  
 acetaminophen (TYLENOL) solution 650 mg  650 mg Per NG tube Q4H PRN Or  
 acetaminophen (TYLENOL) suppository 650 mg  650 mg Rectal Q4H PRN  
 atorvastatin (LIPITOR) tablet 40 mg  40 mg Oral QHS  labetaloL (NORMODYNE;TRANDATE) injection 5 mg  5 mg IntraVENous Q10MIN PRN  
 insulin lispro (HUMALOG) injection   SubCUTAneous Q6H  
 glucose chewable tablet 16 g  4 Tab Oral PRN  
 dextrose (D50W) injection syrg 12.5-25 g  12.5-25 g IntraVENous PRN  
 glucagon (GLUCAGEN) injection 1 mg  1 mg IntraMUSCular PRN Medical History Past Medical History:  
Diagnosis Date  Alzheimer's disease ([facility]  CHF (congestive heart failure) ([facility]  Dementia ([facility]  Diabetes ([facility] type II  
 Elevated troponin 12/11/2015  Essential hypertension  Fall as cause of accidental injury in residential institution as place of occurrence 12/11/2015  GERD (gastroesophageal reflux disease)  [facility]  Hyperlipidemia  Hyperlipidemia  Hypoglycemia  Ill-defined condition   
 embolism (DVT)  Paroxysmal atrial fibrillation (HCC)  Psychiatric disorder   
 anxiety Exam: 
 
Visit Vitals /66 (BP 1 Location: Left arm, BP Patient Position: At rest) Pulse 76 Temp 97.4 °F (36.3 °C) Resp 20 Ht 5' 5\" (1.651 m) Wt 190 lb (86.2 kg) SpO2 97% BMI 31.62 kg/m² General: Well developed, well nourished. Patient in no apparent distress Head: Normocephalic, atraumatic, anicteric sclera Neck Normal ROM, No thyromegally Cardiac: Regular rate and rhythm Ext: No pedal edema Skin: Supple no rash NeurologicExam: 
 
Mental Status: Alert and oriented to person only Speech: Fluent no aphasia or dysarthria. Cranial Nerves:  II - XII Intact Motor:  Full and symmetric strength of upper and lower ext. Normal bulk and tone. Sensory:   Symmetric and intact Tremor:   No tremor noted. Lab Review Lab Results Component Value Date/Time WBC 5.8 03/22/2020 04:08 AM  
 HCT 34.4 (L) 03/22/2020 04:08 AM  
 HGB 10.5 (L) 03/22/2020 04:08 AM  
 PLATELET 864 91/23/4270 04:08 AM  
 
 
Lab Results Component Value Date/Time Sodium 145 03/22/2020 04:08 AM  
 Potassium 4.1 03/22/2020 04:08 AM  
 Chloride 110 (H) 03/22/2020 04:08 AM  
 CO2 29 03/22/2020 04:08 AM  
 Glucose 189 (H) 03/22/2020 04:08 AM  
 BUN 33 (H) 03/22/2020 04:08 AM  
 Creatinine 1.88 (H) 03/22/2020 04:08 AM  
 Calcium 8.8 03/22/2020 04:08 AM  
 
 
Lab Results Component Value Date/Time Hemoglobin A1c 7.4 (H) 01/14/2020 04:42 AM  
 Hemoglobin A1c, External 7.4 01/14/2016 06:40 AM  
  
 
Lab Results Component Value Date/Time Vitamin B12 429 03/21/2020 05:28 AM  
 
 
 
Lab Results Component Value Date/Time  Cholesterol, total 153 03/21/2020 05:28 AM  
 HDL Cholesterol 48 03/21/2020 05:28 AM  
 LDL, calculated 82.2 03/21/2020 05:28 AM  
 VLDL, calculated 22.8 03/21/2020 05:28 AM  
 Triglyceride 114 03/21/2020 05:28 AM  
 CHOL/HDL Ratio 3.2 03/21/2020 05:28 AM

## 2020-03-22 NOTE — PROGRESS NOTES
Pharmacy Warfarin Pharmacist Dosing Indication: Afib Creatinine Clearance (mL/min): Estimated Creatinine Clearance: 20.7 mL/min (A) (based on SCr of 1.88 mg/dL (H)). Estimated Creatinine Clearance (using IBW):17.2 mL/min Warfarin PTA Regimen:  2.5mg Tu and Fri, 5mg MoWeThSaSu Labs: 
Recent Labs  
  03/22/20 
0408 03/21/20 
0528 03/20/20 
1032 03/20/20 
1537 CREA 1.88* 1.44*  --  2.06* HGB 10.5*  --   --  10.2*   --   --  191 INR 1.6*  --  1.3*  -- Wt Readings from Last 1 Encounters:  
03/21/20 86.2 kg (190 lb) Ht Readings from Last 1 Encounters:  
03/21/20 165.1 cm (65\") Diet:  full liquid diet Impression/Plan:  
INR 0.3 rise from 3/21 Warfarin 5mg 3/22 Daily INR Thank you, 
Fredia Hodgkins, [facility]

## 2020-03-22 NOTE — PROGRESS NOTES
Hospitalist Progress Note NAME: Milton Day :  1927 MRN:  [id number] Admit date: 3/20/2020 Today's date: 20 PCP: MD Zahida Loaiza M.D. Cell [phone] Assessment / Plan: 
Acute encephalopathy POA Baseline dementia Semi-independent on her activities of daily living, resides in a nursing home,       Occasionally feeds herself and is wheelchair-bound CTA neck shows diminished vertebrobasilar system but carotids are normal 
normal ammonia, B12 level, TSH Urinanalysis is within normal limits MRI of the brain with atrophy, no CVA 
2D echocardiogram LVEF 55 to 60% Neuro consult reviewed Advance diet ASA Overall improved,back to SNF once set up 
  
History of atrial fibrillation on anticoagulation with warfarin -INR is 1.3.  
-Resume warfarin, no CVA, stop lovenox bridge 3/21 
  
Diabetes mellitus type 2 
-Hold home insulin 
-Start insulin sliding scale with blood sugar checks qAC and qHS 
- , 194, 182, 184 
  
Acute diastolic congestive heart failure exacerbation 
-Chest x-ray shows ? mild pulmonary edema 
-looks dry, hold further IV lasix 
- [id number] -Resume demedex  
-resume coreg 
-check PA/lat CXR in AM 
  
History of CKD stage III 
-Baseline creatinine is around 1.5-2. Currently creatinine is 2.0. 
-Monitor creatinine daily, improving 
  
History of Alzheimer's dementia History of gouty arthritis Iron deficiency anemia Dyslipidemia History of anxiety History of GERD 
- start to resume all oral meds. Resume when appropriate Obesity POA Body mass index is 31.62 kg/m². Code Status: DNR Surrogate Decision Maker: Son 
  
DVT Prophylaxis: Lovenox 
  
Baseline: Resident of nursing home, wheelchair-bound per son and semi-independent on others for activities of daily Subjective: Chief Complaint / Reason for Physician Visit \"no problems\". Discussed with RN events overnight. No complaints, awake, alert and talking MRI negative for stroke Review of Systems: 
Symptom Y/N Comments  Symptom Y/N Comments Fever/Chills n   Chest Pain n   
Poor Appetite    Edema Cough n   Abdominal Pain n   
Sputum    Joint Pain SOB/BUTTS n   Headache Nausea/vomit n   Tolerating PT/OT Diarrhea n   Tolerating Diet y Constipation    Other Could NOT obtain due to:   
 
Objective: VITALS:  
Last 24hrs VS reviewed since prior progress note. Most recent are: 
Patient Vitals for the past 24 hrs: 
 Temp Pulse Resp BP SpO2  
03/22/20 1518 97.4 °F (36.3 °C) 76 20 164/66 97 % 03/22/20 1143 97.3 °F (36.3 °C) (!) 54 18 134/54 96 % 03/22/20 0715 97.8 °F (36.6 °C) (!) 55 16 146/63 98 % 03/22/20 0332 97.8 °F (36.6 °C) 84 16 141/75 100 % 03/21/20 2356 98.6 °F (37 °C) 82 16 131/41 98 % 03/21/20 2115 97.6 °F (36.4 °C) 87 16 155/65 98 % 03/21/20 1524 97.5 °F (36.4 °C) (!) 108 18 128/56 96 % No intake or output data in the 24 hours ending 03/22/20 1523 Wt Readings from Last 12 Encounters:  
03/21/20 86.2 kg (190 lb) 01/16/20 86.7 kg (191 lb 1.6 oz)  
11/22/19 88.9 kg (196 lb) 10/25/19 88.9 kg (196 lb)  
09/19/19 89 kg (196 lb 3.4 oz) 01/23/17 85.4 kg (188 lb 4.8 oz) 12/11/15 90.7 kg (200 lb) 08/28/14 86 kg (189 lb 9.6 oz) 08/07/14 96.1 kg (211 lb 12.8 oz) PHYSICAL EXAM: 
General: WD, WN. Alert, cooperative, no acute distress EENT:  PERRL. Anicteric sclerae. Dry MM Neck:  No meningismus, no thyromegaly Resp:  CTA bilaterally, no wheezing or rales. No accessory muscle use CV:  Regular  rhythm,  No edema GI:  Soft, Non distended, Non tender. +Bowel sounds, no rebound Neurologic:  Alert, talking, slow, but oriented x 2, normal speech, non focal motor exam 
Psych:   Not anxious nor agitated Skin:  No rashes. No jaundice Reviewed most current lab test results and cultures  YES Reviewed most current radiology test results   YES 
 Review and summation of old records today    NO Reviewed patient's current orders and MAR    YES 
PMH/SH reviewed - no change compared to H&P 
________________________________________________________________________ Care Plan discussed with: 
  Comments Patient x Family  x   
RN x Care Manager Consultant Multidiciplinary team rounds were held today with , nursing, pharmacist and clinical coordinator. Patient's plan of care was discussed; medications were reviewed and discharge planning was addressed. ________________________________________________________________________ Comments >50% of visit spent in counseling and coordination of care    
________________________________________________________________________ Marquita Burk MD  
 
Procedures: see electronic medical records for all procedures/Xrays and details which were not copied into this note but were reviewed prior to creation of Plan. LABS: 
I reviewed today's most current labs and imaging studies. Pertinent labs include: 
Recent Labs  
  03/22/20 
0408 03/20/20 
5584 WBC 5.8 6.0 HGB 10.5* 10.2* HCT 34.4* 33.6*  191 Recent Labs  
  03/22/20 
0408 03/21/20 
0528 03/20/20 
1032 03/20/20 
9470  146*  --  142  
K 4.1 3.0*  --  3.6 * 110*  --  108 CO2 29 32  --  29 * 131*  --  226* BUN 33* 29*  --  42* CREA 1.88* 1.44*  --  2.06* CA 8.8 9.1  --  8.8 ALB  --   --   --  2.8* TBILI  --   --   --  0.3 SGOT  --   --   --  7* ALT  --   --   --  15 INR 1.6*  --  1.3*  --

## 2020-03-22 NOTE — PROGRESS NOTES
Problem: Falls - Risk of 
Goal: *Absence of Falls Description: Document Bishop Dietrich Fall Risk and appropriate interventions in the flowsheet. Outcome: Progressing Towards Goal 
Note: Fall Risk Interventions: 
Mobility Interventions: Bed/chair exit alarm, Patient to call before getting OOB Mentation Interventions: Bed/chair exit alarm Medication Interventions: Bed/chair exit alarm, Evaluate medications/consider consulting pharmacy, Patient to call before getting OOB Elimination Interventions: Bed/chair exit alarm Problem: Pressure Injury - Risk of 
Goal: *Prevention of pressure injury Description: Document Arnulfo Scale and appropriate interventions in the flowsheet. Outcome: Progressing Towards Goal 
Note: Pressure Injury Interventions: 
Sensory Interventions: Assess changes in LOC, Discuss PT/OT consult with provider Moisture Interventions: Apply protective barrier, creams and emollients, Check for incontinence Q2 hours and as needed, Internal/External urinary devices Activity Interventions: Increase time out of bed, PT/OT evaluation Mobility Interventions: Chair cushion, PT/OT evaluation, Turn and reposition approx. every two hours(pillow and wedges) Nutrition Interventions: Document food/fluid/supplement intake Friction and Shear Interventions: Minimize layers

## 2020-03-23 NOTE — CDMP QUERY
The diagnosis of encephalopathy has been documented for this patient who has a history of dementia. Additional documentation is needed to support the diagnosis of encephalopathy in the setting of dementia. Please include this documentation in the progress notes and discharge summary. Acute Delirium on dementia and Acute encephalopathy ruled out Acute encephalopathy confirmed Other Unable to determine The medical record reflects the following: 
  Risk Factors: presents with acute encephalopathy Clinical Indicators : neuro noted. \"AMS resovled may be rt gouty flare up, late onset Alzheimers\"; pO2 69, Treatment: xray showing pulmonary edema, Lasix IV dose given. MRI/CT normal for patient. Thank you Amari Yap RN/CCDS 
[phone] Best Practice Documentation Includes: 
* What is the patient's baseline mental status, and how does it compare to current mental status? * Have the patient's behaviors changed? Are they experiencing any hallucinations, sensory misperceptions, hypervigilance, disturbed sleep/wake cycle, or paranoia? * Is the patient requiring a level of nursing care or medications that have been increased since their admission? * Is the patient experiencing any new motor abnormalities (tremor, asterixis, myoclonus, etc.)

## 2020-03-23 NOTE — PROGRESS NOTES
Pharmacy Warfarin Pharmacist Dosing Indication: Afib Creatinine Clearance (mL/min): Estimated Creatinine Clearance: 20.7 mL/min (A) (based on SCr of 1.88 mg/dL (H)). Estimated Creatinine Clearance (using IBW):17.2 mL/min Warfarin PTA Regimen:  2.5mg Tu and Fri, 5mg MoWeThSaSu Labs: 
Recent Labs  
  03/23/20 
0321 03/22/20 
0408 03/21/20 
0528 03/20/20 
1032 CREA  --  1.88* 1.44*  --   
HGB  --  10.5*  --   --   
PLT  --  215  --   --   
INR 1.9* 1.6*  --  1.3* Wt Readings from Last 1 Encounters:  
03/21/20 86.2 kg (190 lb) Ht Readings from Last 1 Encounters:  
03/21/20 165.1 cm (65\") Diet:  full liquid diet Impression/Plan:  
INR 0.3 rise from 3/21 Warfarin 2.5 mg 3/23 Daily INR Thank you, Alejandro Peña, PHARMD

## 2020-03-23 NOTE — DISCHARGE INSTRUCTIONS
HOSPITALIST DISCHARGE INSTRUCTIONS    NAME: Nickey Duane   :  1927   MRN:  [id number]     Date/Time:  3/23/2020 8:54 AM    ADMIT DATE: 3/20/2020   DISCHARGE DATE: 3/23/2020     Attending Physician: Angle Zafar MD    DISCHARGE DIAGNOSIS:  Metabolic encephalopathy    CHF      Medications: Per above medication reconciliation. Pain Management: per above medications    Recommended diet: Cardiac Diet    Recommended activity: Activity as tolerated    Wound care: None    Indwelling devices:  None    Supplemental Oxygen: None    Required Lab work: Basic metabolic and PT INR in 3 days, results to covering MD, further INR checks per covering MD    Glucose management:  Accucheck ACHS with sliding scale per SNF protocol    Code status: DNR        Outside physician follow up: Follow-up Information     Follow up With Specialties Details Why Contact Info    Eleni Ryan MD Internal Medicine   [address] [place] [address]  [address]  [place] [address]  [phone]                 Skilled nursing facility/ SNF MD responsible for above on discharge. Information obtained by :  I understand that if any problems occur once I am at home I am to contact my physician. I understand and acknowledge receipt of the instructions indicated above.                                                                                                                                            Physician's or R.N.'s Signature                                                                  Date/Time                                                                                                                                              Patient or Repres

## 2020-03-23 NOTE — PROGRESS NOTES
JAMES Plan: * Return to LTC ([address] [place] [address]) 
 
> Call report to [address] [place] [address] at d/c ([phone]); pt assigned to room 205A  
> CM secured [facility] transport with pick-up time of 1:30 PM; PCS on pt chart 
> CM will fax d/c summary to LTC facility once available ([phone]) 
> 2nd IM & BPCI-A bundle letter provided to pt's son 11:50 AM: CM provided pt's son with 2nd IM and BCPI-A bundle letter; CM received verbal acknowledgement for both documents. Copy of 2nd IM placed on chart. No further CM needs identified; CM will remain available for consult if additional needs arise before pt leaves the facility. 9:54 AM: CM received call back from pt's son who is agreeable to d/c plan. Pt's son reported no questions or concerns. CM confirmed [facility] transport time with pick-up of 1:30 PM; PCS placed on chart. CM informed pt's son of transport time; pt's son confirmed understanding. Initial note: CM acknowledged d/c. CM reviewed pt's chart and noted updates prior to moving forward with d/c planning. CM confirmed with [address] [place] [address] nurse liaison, Yoni Corcoran ([phone]) that pt can return to facility today at d/c. CM actively working to [address] [place] [address] Nw transport and will report confirmed pick-up time once available. CM contacted pt's son/primary point of contact Marieva Resendiz: [phone]) to make contact and review JAMES plan for d/c; CM reached voicemail and requested call back. Care Management Interventions PCP Verified by CM: Yes Mode of Transport at Discharge: [facility] Time of Discharge: 1330 Transition of Care Consult (CM Consult): [facility] 
Discharge Durable Medical Equipment: No 
Physical Therapy Consult: Yes Occupational Therapy Consult: Yes Speech Therapy Consult: Yes Current Support Network: [address] [place] [address] Confirm Follow Up Transport: Family The Plan for Transition of Care is Related to the Following Treatment Goals : Return to [facility] ([address]) The Patient and/or Patient Representative was Provided with a Choice of Provider and Agrees with the Discharge Plan?: Yes Name of the Patient Representative Who was Provided with a Choice of Provider and Agrees with the Discharge Plan: pt's son Sabrina Mireles) Freedom of Choice List was Provided with Basic Dialogue that Supports the Patient's Individualized Plan of Care/Goals, Treatment Preferences and Shares the Quality Data Associated with the Providers?: Yes [employer] Information Provided?: No 
Discharge Location Discharge Placement: Long Term Care(Return to [address]) ДМИТРИЙ Albrecht Care Manager, [facility] 
[phone]

## 2020-03-23 NOTE — PROGRESS NOTES
Problem: Dysphagia (Adult) Goal: *Acute Goals and Plan of Care (Insert Text) Description: 3/20/2020 Speech path goals 1. Pt will participate with reeval of swallowing daily. MET 3/23/2020 2. Patient will tolerate a mechanical soft diet/ thin liquids free of s/s aspiration within 7 days Outcome: Progressing Towards Goal 
 SPEECH LANGUAGE PATHOLOGY DYSPHAGIA TREATMENT Patient: Lori Bronson (51 y.o. female) Date: 3/23/2020 Diagnosis: Confusion [R41.0] <principal problem not specified> Precautions: aspiration ASSESSMENT: 
Patient with improved mentation compared to initial swallow eval last week. Patient with mild-mod oral and mild pharyngeal dysphagia. Slow mastication of solid and delayed posterior propulsion of solids; however, patient with minimal dentition. Suspect mildly delayed swallow initiation via palpation. No overt s/s aspiration with successive cup/straw sips of thin, puree or solid. PLAN: 
Recommendations and Planned Interventions: 
-- mechanical soft diet/ thin liquids. Straw ok 
-- feeding assistance with all meals -- meds 1 at a time in [facility] Patient continues to benefit from skilled intervention to address the above impairments. Continue treatment per established plan of care. Discharge Recommendations: To Be Determined- follow up for diet check once return to SNF SUBJECTIVE:  
Patient stated I like the coffee. OBJECTIVE:  
Cognitive and Communication Status: 
Neurologic State: Alert Orientation Level: Oriented to person, Disoriented to place, Disoriented to situation, Disoriented to time Cognition: Follows commands Perception: Appears intact Perseveration: No perseveration noted Dysphagia Treatment: P.O. Trials: 
Patient Position: (up in bed) Vocal quality prior to P.O.: No impairment Consistency Presented: Thin liquid;Puree; Solid How Presented: Successive swallows;Straw;Cup/sip; Self-fed/presented;SLP-fed/presented;Spoon Bolus Acceptance: Impaired Bolus Formation/Control: Impaired Type of Impairment: Drooling Propulsion: Delayed (# of seconds) Oral Residue: None Initiation of Swallow: Delayed (# of seconds) Laryngeal Elevation: Decreased Aspiration Signs/Symptoms: None Oral Phase Severity: Mild-moderate Pharyngeal Phase Severity : Mild Pain: 
Pain Scale 1: Numeric (0 - 10) Pain Intensity 1: 0 After treatment:  
Patient left in no apparent distress in bed, Call bell within reach, and Nursing notified COMMUNICATION/EDUCATION:  
 
The patient's plan of care including recommendations, planned interventions, and recommended diet changes were discussed with: Registered nurse. Prince [place] SLP Time Calculation: 16 mins

## 2020-03-23 NOTE — PROGRESS NOTES
Chief Complaint: altered mental status Laying in bed asleep. Easily awakened. Son is in room. No complaints. Speech recommended mechanical soft. Looking forward to being discharged today. Assesment and Plan 1. Altered mental status Resolved 
  
2. Late onset Alzheimer's disease without behavioral disturbance (HCC) 
conitnue memantine 
  
3. Hyperlipidemia Continue atorvastatin 
  
4. Diabetes On insulin 
  
5.  Gouty arthritis Improved 6 Atrial fibrillation Continue coumadin Allergies Influenza virus vaccine, specific; Penicillins; and Sulfacetamide Medications Current Facility-Administered Medications Medication Dose Route Frequency  warfarin (COUMADIN) tablet 2.5 mg  2.5 mg Oral ONCE  
 aspirin chewable tablet 81 mg  81 mg Oral DAILY  carvediloL (COREG) tablet 3.125 mg  3.125 mg Oral BID WITH MEALS  
 torsemide (DEMADEX) tablet 20 mg  20 mg Oral DAILY  WARFARIN INFORMATION NOTE (COUMADIN)   Other QPM  
 albuterol-ipratropium (DUO-NEB) 2.5 MG-0.5 MG/3 ML  3 mL Nebulization Q4H PRN  
 benztropine (COGENTIN) tablet 1 mg  1 mg Oral BID  acetaminophen (TYLENOL) tablet 650 mg  650 mg Oral Q4H PRN Or  
 acetaminophen (TYLENOL) solution 650 mg  650 mg Per NG tube Q4H PRN Or  
 acetaminophen (TYLENOL) suppository 650 mg  650 mg Rectal Q4H PRN  
 atorvastatin (LIPITOR) tablet 40 mg  40 mg Oral QHS  labetaloL (NORMODYNE;TRANDATE) injection 5 mg  5 mg IntraVENous Q10MIN PRN  
 insulin lispro (HUMALOG) injection   SubCUTAneous Q6H  
 glucose chewable tablet 16 g  4 Tab Oral PRN  
 dextrose (D50W) injection syrg 12.5-25 g  12.5-25 g IntraVENous PRN  
 glucagon (GLUCAGEN) injection 1 mg  1 mg IntraMUSCular PRN Medical History Past Medical History:  
Diagnosis Date  Alzheimer's disease ([facility]  CHF (congestive heart failure) ([facility]  Dementia ([facility]  Diabetes ([facility] type II  
 Elevated troponin 12/11/2015  Essential hypertension  Fall as cause of accidental injury in residential institution as place of occurrence 12/11/2015  GERD (gastroesophageal reflux disease)  Heart failure ([facility]  Hyperlipidemia  Hyperlipidemia  Hypoglycemia  Ill-defined condition   
 embolism (DVT)  Paroxysmal atrial fibrillation (HCC)  Psychiatric disorder   
 anxiety Review of Systems Eyes: Negative for blurred vision and double vision. Respiratory: Negative for cough and shortness of breath. Cardiovascular: Negative for chest pain, palpitations and orthopnea. Gastrointestinal: Negative for nausea and vomiting. Neurological: Negative for dizziness and headaches. Psychiatric/Behavioral: Negative for depression. The patient is not nervous/anxious. Exam: 
 
Visit Vitals /77 (BP 1 Location: Left arm, BP Patient Position: At rest) Pulse (!) 56 Temp 98.4 °F (36.9 °C) Resp 18 Ht 5' 5\" (1.651 m) Wt 167 lb 12.8 oz (76.1 kg) SpO2 97% BMI 27.92 kg/m² General: Well developed, well nourished. Patient in no apparent distress Head: Normocephalic, atraumatic, anicteric sclera Neck Normal ROM, No thyromegally Cardiac: Regular rate and rhythm Ext: No pedal edema Skin: Supple no rash NeurologicExam: 
 
Mental Status: Alert and oriented to person only Speech: Fluent no aphasia or dysarthria. Cranial Nerves:  II - XII Intact Motor:  Full and symmetric strength of upper and lower ext. Normal bulk and tone. Sensory:   Symmetric and intact Tremor:   No tremor noted. Lab Review Lab Results Component Value Date/Time WBC 5.8 03/22/2020 04:08 AM  
 HCT 34.4 (L) 03/22/2020 04:08 AM  
 HGB 10.5 (L) 03/22/2020 04:08 AM  
 PLATELET 360 93/83/9655 04:08 AM  
 
 
Lab Results Component Value Date/Time  Sodium 145 03/22/2020 04:08 AM  
 Potassium 4.1 03/22/2020 04:08 AM  
 Chloride 110 (H) 03/22/2020 04:08 AM  
 CO2 29 03/22/2020 04:08 AM  
 Glucose 189 (H) 03/22/2020 04:08 AM  
 BUN 33 (H) 03/22/2020 04:08 AM  
 Creatinine 1.88 (H) 03/22/2020 04:08 AM  
 Calcium 8.8 03/22/2020 04:08 AM  
 
 
Lab Results Component Value Date/Time Hemoglobin A1c 7.4 (H) 01/14/2020 04:42 AM  
 Hemoglobin A1c, External 7.4 01/14/2016 06:40 AM  
  
 
Lab Results Component Value Date/Time Vitamin B12 429 03/21/2020 05:28 AM  
 
 
 
Lab Results Component Value Date/Time  Cholesterol, total 153 03/21/2020 05:28 AM  
 HDL Cholesterol 48 03/21/2020 05:28 AM  
 LDL, calculated 82.2 03/21/2020 05:28 AM  
 VLDL, calculated 22.8 03/21/2020 05:28 AM  
 Triglyceride 114 03/21/2020 05:28 AM  
 CHOL/HDL Ratio 3.2 03/21/2020 05:28 AM

## 2020-03-23 NOTE — PROGRESS NOTES
Problem: Falls - Risk of 
Goal: *Absence of Falls Description: Document Evita Lutz Fall Risk and appropriate interventions in the flowsheet. Outcome: Progressing Towards Goal 
Note: Fall Risk Interventions: 
Mobility Interventions: Bed/chair exit alarm Mentation Interventions: Bed/chair exit alarm Medication Interventions: Bed/chair exit alarm, Evaluate medications/consider consulting pharmacy, Patient to call before getting OOB Elimination Interventions: Bed/chair exit alarm Problem: Pressure Injury - Risk of 
Goal: *Prevention of pressure injury Description: Document Arnulfo Scale and appropriate interventions in the flowsheet. Outcome: Progressing Towards Goal 
Note: Pressure Injury Interventions: 
Sensory Interventions: Assess changes in LOC Moisture Interventions: Apply protective barrier, creams and emollients Activity Interventions: Increase time out of bed Mobility Interventions: Chair cushion Nutrition Interventions: Document food/fluid/supplement intake Friction and Shear Interventions: Minimize layers

## 2020-03-25 NOTE — DISCHARGE SUMMARY
Hospitalist Discharge Note NAME: Venice Devi :  [id number] MRN:  [id number] Admit date: 3/20/2020 Discharge date: 20 PCP: Sandip Sommers MD 
 
Discharge Diagnoses: 
 
Acute metabolic encephalopathy POA ruled in, on top of baseline dementia Acute on chronic diastolic congestive heart failure POA Permanent atrial fibrillation POA on coumadin Supratherapeutic INR POA 
  
Diabetes mellitus type 2 History of CKD stage III 
  
History of Alzheimer's dementia History of gouty arthritis Iron deficiency anemia Dyslipidemia History of anxiety History of GERD Overweight POA Body mass index is 27.92 kg/m². Code Status: DNR Discharge Medications: 
 
Current Outpatient Medications Medication Sig  warfarin (COUMADIN) 2.5 mg tablet Take 2.5 mg by mouth two (2) days a week. Patient takes 5 mg M,W,TH,SAT, SUN and takes 2.5 mg every TUES and FRI  albuterol-ipratropium (DUO-NEB) 2.5 mg-0.5 mg/3 ml nebu 3 mL by Nebulization route every four (4) hours as needed for Wheezing.  zinc oxide (SECURA EXTRA PROTECTIVE) 30.6 % topical cream Apply  to affected area four (4) times daily as needed for Skin Irritation (to right buttock).  insulin lispro (HUMALOG U-100 INSULIN) 100 unit/mL injection 5 Units by SubCUTAneous route three (3) times daily (with meals).  alendronate (FOSAMAX) 70 mg tablet Take 70 mg by mouth every seven (7) days.  pantoprazole (PROTONIX) 40 mg tablet Take 40 mg by mouth daily.  torsemide (DEMADEX) 100 mg tablet Take 100 mg by mouth daily.  acetaminophen (TYLENOL) 325 mg tablet Take 650 mg by mouth every six (6) hours as needed for Pain.  allopurinol (ZYLOPRIM) 100 mg tablet Take 100 mg by mouth daily.  amLODIPine (NORVASC) 10 mg tablet Take 10 mg by mouth daily.  warfarin (COUMADIN) 5 mg tablet Take 5 mg by mouth five (5) days a week. Patient takes 5 mg M,W,TH,SAT, SUN and takes 2.5 mg every TUES and FRI  carvedilol (COREG) 3.125 mg tablet Take 3.125 mg by mouth two (2) times daily (with meals).  ferrous sulfate (IRON) 325 mg (65 mg iron) EC tablet Take 325 mg by mouth Before breakfast and dinner.  benztropine (COGENTIN) 1 mg tablet Take 1 mg by mouth two (2) times a day.  memantine ER (NAMENDA XR) 28 mg capsule Take 28 mg by mouth daily.  potassium chloride (KAON 10%) 20 mEq/15 mL solution Take 20 mEq by mouth daily.  pravastatin (PRAVACHOL) 20 mg tablet Take 20 mg by mouth nightly. Follow-up Information Follow up With Specialties Details Why Contact Info Avtar Morris MD Internal Medicine   [address] [place] [address] [address] [facility] 
[phone] [address] [place] [address] [address] [facility]    [address] [facility] [address] [phone] Recommended diet: Cardiac Diet Recommended activity: Activity as tolerated Wound care: None Indwelling devices:  None Supplemental Oxygen: None Required Lab work: Basic metabolic and PT INR in 3 days, results to covering MD, further INR checks per covering MD 
 
Glucose management:  Accucheck ACHS with sliding scale per SNF protocol Code status: DNR Time spent on discharge:   I spent greater than 30 minutes on discharge, seeing and examining the patient, reconciling home meds and new meds, coordinating care with case management, doing the discharge papers and the D/C summary Discharge disposition: SNF/LTC Discharge Condition: Stable Summary of admission H+P(copied from Dr [place] Roads Note): CHIEF COMPLAINT: confusion 
  
HISTORY OF PRESENT ILLNESS:    
Callie Myers is a 80 y.o.   female who presents with past medical history of dementia, diabetes mellitus, atrial fibrillation on anticoagulation with warfarin is coming to the hospital with chief complaints of nursing home.  Patient is a resident of nursing home, has dementia at baseline and is not a very good historian. Information is obtained by talking to patient's son at the bedside. According to them, patient resides in a nursing home and they have been under lockdown for close to 1 week. Patient's son reports that her mother does have dementia but at baseline she is normally alert awake and identify family members and also can feed herself at times but currently she is very drowsy, confused and is refusing feeds and is not herself. She did not specifically report any complaints but she does have chronic right-sided weakness from gout related pain and does not use her right arm frequently. Further information regarding history of presenting illness is limited secondary to her confusion. 
  
On arrival to the hospital, she was noted to have slightly high blood pressure. On lab work she was noted to have normal CBC. CMP showed a creatinine of 2.06. LFTs were within normal limits. Chest x-ray showed mild pulmonary edema. CT showed no acute cranial process but showed severe chronic microvascular ischemic changes. CTA head showed diminished circulation in the posterior circulation. 
  
We were asked to admit for work up and evaluation of the above problems.  
  
    
Past Medical History:  
Diagnosis Date  Alzheimer's disease ([facility] [address]    
 CHF (congestive heart failure) (HCC)    
 Dementia (HCC)    
 Diabetes ([facility] [address]    
  type II  
 Elevated troponin 12/11/2015  Essential hypertension    
 Fall as cause of accidental injury in residential institution as place of occurrence 12/11/2015  GERD (gastroesophageal reflux disease)    
 Heart failure (HCC)    
 Hyperlipidemia    
 Hyperlipidemia    
 Hypoglycemia    
 Ill-defined condition    
  embolism (DVT)  Paroxysmal atrial fibrillation (HCC)    
 Psychiatric disorder    
  anxiety Admit pCXR read by radiology FINDINGS:  
 Cardiac monitoring wires overlie the thorax. Cardiomegaly is 
unchanged. Aortic arch is atherosclerotic without aneurysm. Hypervascular 
prominence is new and mild. Reticular interstitial opacities are new and mild. No focal airspace opacity. No 
pneumothorax. Bones are osteopenic. IMPRESSION: 
Mild CHF pattern of pulmonary edema in the proper clinical setting, new since January. No lobar pneumonia or pneumothorax. Repeat CXR 3/22/2020 read by radiology results PA and lateral views demonstrate stable cardiomegaly. And mild pulmonary edema 
has resolved. Degenerative changes are seen in the thoracic spine and shoulder 
joints bilaterally. Impression: Resolved pulmonary edema. No acute abnormality. 
  
Admit head CT read by radiology FINDINGS: 
Examination is suboptimal secondary to patient motion artifact. The ventricles 
and sulci are stable in size, shape and configuration and midline. There is 
unchanged diffuse periventricular white matter disease. There is no intracranial 
hemorrhage, extra-axial collection, mass, mass effect or midline shift. The 
basilar cisterns are open. No acute infarct is identified. The bone windows 
demonstrate no abnormalities. The visualized portions of the paranasal sinuses 
and mastoid air cells are clear. IMPRESSION: No evidence of acute infarct, intracranial hemorrhage, or 
intracranial mass. Persistent diffuse periventricular white matter disease, 
compatible with chronic small vessel ischemia. Admit CTA head and neck read by radiology FINDINGS: 
CTA NECK There is mild centrilobular emphysematous change. Aortic atherosclerotic change 
is mild. Mild atherosclerotic change of the common carotid arteries. Left 
vertebral artery is dominant. Multilevel severe degenerative changes of the 
cervical spine with multilevel severe canal and foraminal stenoses.  Degenerative 
cord compression of the cervical spine. 
% of right carotid artery stenosis: 0 
 % of left carotid artery stenosis: Less than 20 NASCET method was utilized for calculating stenosis. The cervical soft tissues are unremarkable. There are degenerative changes 
of the cervical spine. CTA HEAD Severe multifocal vertebral artery stenoses. Severe stenosis/occlusion of the 
basilar artery. Limited inflow to the posterior cerebral arteries from the 
basilar. There are bilateral persistent fetal circulation to the posterior 
cerebral arteries demonstrated. Trent Gregory Petrous and cavernous ICAs demonstrate 
atherosclerotic change without hemodynamically significant stenosis. Azygous 
origin of the A3 segments. There are small A1 segments bilaterally. M1 segments 
are patent. Mild to moderate M1 stenoses on the right. Dural venous sinuses are 
grossly patent. Confluent periventricular and scattered hypodensities in the 
cerebral white matter are extensive. Sulcal and ventricular prominence is 
extensive as well. Trent Ksenia IMPRESSION:  
Diminutive vertebrobasilar system with multifocal severe stenoses/occlusion of 
the basilar artery. There is persistent fetal circulation to the posterior 
cerebral artery on the right and on the left. Patent internal carotid artery and middle cerebral arteries on the right and on 
the left. Severe chronic microvascular ischemic change and severe cerebral atrophy as well. Trent Gregory MRI brain read by radiology FINDINGS: 
Generalized parenchymal volume loss with commensurate dilation of the sulci and 
ventricular system. There is severe disproportionate atrophy of the bilateral 
hippocampi and mesial temporal lobes. Confluent periventricular deep white 
matter T2/FLAIR hyperintensities, and patchy T2/FLAIR hyperintensity in the 
nicko, consistent with severe chronic microvascular ischemic disease. There is no 
acute infarct, hemorrhage, extra-axial fluid collection, or mass effect. There 
is no cerebellar tonsillar herniation. Expected arterial flow-voids are present. The paranasal sinuses, mastoid air cells, and middle ears are clear. The orbital 
contents are within normal limits. No significant osseous or scalp lesions are 
identified. Degenerative changes throughout the cervical spine with severe right 
facet arthropathy at C1-C2. IMPRESSION:  
1. No acute intracranial abnormality. 2. Generalized parenchymal volume loss with severe disproportionate atrophy of 
the bilateral hippocampi and mesial temporal lobes, favored to represent Alzheimer's dementia. 3. Severe chronic microvascular ischemic disease. Echo TTE read by cardiology Left Ventricle Normal cavity size and systolic function (ejection fraction normal). The estimated ejection fraction is 55 - 60%. Unable to assess diastolic function. Interatrial Septum Agitated saline contrast study was performed. Image quality was fairly good, with no obvious evidence of atrial septal defect or patent foramen ovale. However one cannot absolutely rule out a tiny PFO without EITAN. Hospital course:  
 
Acute metabolic encephalopathy POA ruled in 
Baseline dementia, lives in long-term care Semi-independent on her activities of daily living, resides in a nursing home,       Occasionally feeds herself and is wheelchair-bound Lethargic at admit ? Related to CHF, diuresed with improvement CTA neck shows diminished vertebrobasilar system but carotids are normal 
Normal ammonia, B12 level, TSH Urinanalysis is within normal limits MRI of the brain with atrophy, no CVA 
2D echocardiogram LVEF 55 to 60% Neuro consult reviewed Advance diet ASA Overall improved,back to SNF once set up Was interactive talking on day of discharge Acute on chronic diastolic congestive heart failure POA Chest x-ray shows ? mild pulmonary edema Looks dry after lasix IV, hold further IV lasix pBNP 3187 Resume demedex Resume coreg Permanent atrial fibrillation on anticoagulation with warfarin INR 1.3 at admit Resume warfarin, no CVA, stop lovenox bridge 3/21 INR 1.9 at discharge, INR check in 3 days 
  
Diabetes mellitus type 2 Last HgBa1c 7.4 on 1/14/2020 Insulin sliding scale with blood sugar checks qAC and qHS 
, 194, 182, 184 Resume home insulin at discharge 
  
 
History of CKD stage III Baseline creatinine is around 1.5-2. Currently creatinine is 2.0. Monitor creatinine daily, improving 
  
History of Alzheimer's dementia History of gouty arthritis Iron deficiency anemia Dyslipidemia History of anxiety History of GERD Start to resume all oral meds. Resume when appropriate Obesity POA Body mass index is 27.92 kg/m². Code Status: DNR Surrogate Decision Maker: Son 
  
DVT Prophylaxis: Lovenox 
  
Baseline: Resident of nursing home, wheelchair-bound per son and semi-independent on others for activities of daily Subjective: Chief Complaint / Reason for Physician Visit \"I am fine\". Discussed with RN events overnight. No complaints, awake, alert and talking Review of Systems: 
Symptom Y/N Comments  Symptom Y/N Comments Fever/Chills n   Chest Pain n   
Poor Appetite    Edema Cough n   Abdominal Pain n   
Sputum    Joint Pain SOB/BUTTS n   Headache Nausea/vomit n   Tolerating PT/OT Diarrhea n   Tolerating Diet y Constipation    Other Could NOT obtain due to:   
 
Objective: VITALS:  
Last 24hrs VS reviewed since prior progress note. Most recent are: 
No data found. No intake or output data in the 24 hours ending 03/25/20 1158 Wt Readings from Last 12 Encounters:  
03/23/20 76.1 kg (167 lb 12.8 oz) 01/16/20 86.7 kg (191 lb 1.6 oz)  
11/22/19 88.9 kg (196 lb) 10/25/19 88.9 kg (196 lb)  
09/19/19 89 kg (196 lb 3.4 oz) 01/23/17 85.4 kg (188 lb 4.8 oz) 12/11/15 90.7 kg (200 lb) 08/28/14 86 kg (189 lb 9.6 oz) 08/07/14 96.1 kg (211 lb 12.8 oz) PHYSICAL EXAM: 
General: WD, WN. Alert, cooperative, no acute distress EENT:  PERRL. Anicteric sclerae. Dry MM Neck:  No meningismus, no thyromegaly Resp:  CTA bilaterally, no wheezing or rales. No accessory muscle use CV:  Regular  rhythm,  No edema GI:  Soft, Non distended, Non tender. +Bowel sounds, no rebound Neurologic:  Alert, talking, slow, but oriented x 2, normal speech, non focal motor exam 
Psych:   Not anxious nor agitated Skin:  No rashes. No jaundice Reviewed most current lab test results and cultures  YES Reviewed most current radiology test results   YES Review and summation of old records today    NO Reviewed patient's current orders and MAR    YES 
PMH/SH reviewed - no change compared to H&P 
________________________________________________________________________ Care Plan discussed with: 
  Comments Patient x Family RN x Care Manager Consultant Multidiciplinary team rounds were held today with , nursing, pharmacist and clinical coordinator. Patient's plan of care was discussed; medications were reviewed and discharge planning was addressed. ________________________________________________________________________ Comments >50% of visit spent in counseling and coordination of care    
________________________________________________________________________ Anthony White MD  
 
Procedures: see electronic medical records for all procedures/Xrays and details which were not copied into this note but were reviewed prior to creation of Plan. LABS: 
I reviewed today's most current labs and imaging studies. Pertinent labs include: No results for input(s): WBC, HGB, HCT, PLT, HGBEXT, HCTEXT, PLTEXT, HGBEXT, HCTEXT, PLTEXT in the last 72 hours. Recent Labs  
  03/23/20 
0321 INR 1.9*

## 2021-01-01 ENCOUNTER — HOSPITAL ENCOUNTER (OUTPATIENT)
Dept: CT IMAGING | Age: 86
Discharge: HOME | DRG: 871 | End: 2021-01-19
Attending: NURSE PRACTITIONER
Payer: MEDICARE

## 2021-01-01 ENCOUNTER — APPOINTMENT (OUTPATIENT)
Dept: GENERAL RADIOLOGY | Age: 86
DRG: 871 | End: 2021-01-01
Attending: EMERGENCY MEDICINE
Payer: MEDICARE

## 2021-01-01 ENCOUNTER — APPOINTMENT (OUTPATIENT)
Dept: CT IMAGING | Age: 86
DRG: 871 | End: 2021-01-01
Attending: EMERGENCY MEDICINE
Payer: MEDICARE

## 2021-01-01 ENCOUNTER — HOSPITAL ENCOUNTER (INPATIENT)
Age: 86
LOS: 1 days | Discharge: HOSPICE | DRG: 871 | End: 2021-01-20
Attending: EMERGENCY MEDICINE | Admitting: INTERNAL MEDICINE
Payer: MEDICARE

## 2021-01-01 ENCOUNTER — HOSPITAL ENCOUNTER (INPATIENT)
Age: 86
LOS: 1 days | DRG: 951 | End: 2021-01-20
Attending: FAMILY MEDICINE | Admitting: FAMILY MEDICINE
Payer: OTHER MISCELLANEOUS

## 2021-01-01 ENCOUNTER — HOSPICE ADMISSION (OUTPATIENT)
Dept: HOSPICE | Facility: HOSPICE | Age: 86
End: 2021-01-01
Payer: MEDICARE

## 2021-01-01 VITALS
TEMPERATURE: 98.1 F | SYSTOLIC BLOOD PRESSURE: 126 MMHG | HEART RATE: 60 BPM | DIASTOLIC BLOOD PRESSURE: 68 MMHG | RESPIRATION RATE: 20 BRPM | OXYGEN SATURATION: 66 %

## 2021-01-01 VITALS
DIASTOLIC BLOOD PRESSURE: 60 MMHG | SYSTOLIC BLOOD PRESSURE: 105 MMHG | HEART RATE: 92 BPM | HEIGHT: 65 IN | WEIGHT: 168.21 LBS | OXYGEN SATURATION: 98 % | TEMPERATURE: 96.8 F | BODY MASS INDEX: 28.03 KG/M2 | RESPIRATION RATE: 24 BRPM
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
## 2021-01-01 DIAGNOSIS — G93.40 ACUTE ENCEPHALOPATHY: ICD-10-CM

## 2021-01-01 DIAGNOSIS — R41.82 ALTERED MENTAL STATUS, UNSPECIFIED ALTERED MENTAL STATUS TYPE: ICD-10-CM

## 2021-01-01 DIAGNOSIS — N17.9 ACUTE RENAL FAILURE, UNSPECIFIED ACUTE RENAL FAILURE TYPE (HCC): ICD-10-CM

## 2021-01-01 DIAGNOSIS — Z51.5 HOSPICE CARE PATIENT: ICD-10-CM

## 2021-01-01 DIAGNOSIS — R11.2 INTRACTABLE VOMITING WITH NAUSEA, UNSPECIFIED VOMITING TYPE: ICD-10-CM

## 2021-01-01 DIAGNOSIS — R13.14 PHARYNGOESOPHAGEAL DYSPHAGIA: ICD-10-CM

## 2021-01-01 DIAGNOSIS — R06.02 SOB (SHORTNESS OF BREATH): ICD-10-CM

## 2021-01-01 DIAGNOSIS — K92.2 ACUTE UPPER GI BLEED: ICD-10-CM

## 2021-01-01 DIAGNOSIS — T68.XXXA HYPOTHERMIA, INITIAL ENCOUNTER: Primary | ICD-10-CM
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
## 2021-01-01 DIAGNOSIS — R45.1 RESTLESSNESS AND AGITATION: ICD-10-CM

## 2021-01-01 LAB
ALBUMIN SERPL-MCNC: 3.2 G/DL (ref 3.5–5)
ALBUMIN/GLOB SERPL: 0.8 {RATIO} (ref 1.1–2.2)
ALP SERPL-CCNC: 173 U/L (ref 45–117)
ALT SERPL-CCNC: 126 U/L (ref 12–78)
AMMONIA PLAS-SCNC: 10 UMOL/L
AMPHET UR QL SCN: NEGATIVE
ANION GAP SERPL CALC-SCNC: 3 MMOL/L (ref 5–15)
APAP SERPL-MCNC: <2 UG/ML (ref 10–30)
APPEARANCE UR: CLEAR
AST SERPL-CCNC: 58 U/L (ref 15–37)
BACTERIA URNS QL MICRO: NEGATIVE /HPF
BARBITURATES UR QL SCN: NEGATIVE
BASOPHILS # BLD: 0 K/UL (ref 0–0.1)
BASOPHILS NFR BLD: 0 % (ref 0–1)
BENZODIAZ UR QL: NEGATIVE
BILIRUB SERPL-MCNC: 0.2 MG/DL (ref 0.2–1)
BILIRUB UR QL: NEGATIVE
BUN SERPL-MCNC: 71 MG/DL (ref 6–20)
BUN/CREAT SERPL: 31 (ref 12–20)
CALCIUM SERPL-MCNC: 8.8 MG/DL (ref 8.5–10.1)
CANNABINOIDS UR QL SCN: NEGATIVE
CHLORIDE SERPL-SCNC: 119 MMOL/L (ref 97–108)
CO2 SERPL-SCNC: 27 MMOL/L (ref 21–32)
COCAINE UR QL SCN: NEGATIVE
COLOR UR: ABNORMAL
CREAT SERPL-MCNC: 2.29 MG/DL (ref 0.55–1.02)
DIFFERENTIAL METHOD BLD: ABNORMAL
DRUG SCRN COMMENT,DRGCM: NORMAL
EOSINOPHIL # BLD: 0.1 K/UL (ref 0–0.4)
EOSINOPHIL NFR BLD: 3 % (ref 0–7)
EPITH CASTS URNS QL MICRO: ABNORMAL /LPF
ERYTHROCYTE [DISTWIDTH] IN BLOOD BY AUTOMATED COUNT: 17.4 % (ref 11.5–14.5)
ETHANOL SERPL-MCNC: <10 MG/DL
GLOBULIN SER CALC-MCNC: 4.1 G/DL (ref 2–4)
GLUCOSE SERPL-MCNC: 149 MG/DL (ref 65–100)
GLUCOSE UR STRIP.AUTO-MCNC: NEGATIVE MG/DL
HCT VFR BLD AUTO: 33 % (ref 35–47)
HGB BLD-MCNC: 10.2 G/DL (ref 11.5–16)
HGB UR QL STRIP: NEGATIVE
HYALINE CASTS URNS QL MICRO: ABNORMAL /LPF (ref 0–5)
IMM GRANULOCYTES # BLD AUTO: 0 K/UL (ref 0–0.04)
IMM GRANULOCYTES NFR BLD AUTO: 0 % (ref 0–0.5)
KETONES UR QL STRIP.AUTO: NEGATIVE MG/DL
LACTATE BLD-SCNC: <0.3 MMOL/L (ref 0.4–2)
LEUKOCYTE ESTERASE UR QL STRIP.AUTO: NEGATIVE
LYMPHOCYTES # BLD: 0.7 K/UL (ref 0.8–3.5)
LYMPHOCYTES NFR BLD: 28 % (ref 12–49)
MCH RBC QN AUTO: 23 PG (ref 26–34)
MCHC RBC AUTO-ENTMCNC: 30.9 G/DL (ref 30–36.5)
MCV RBC AUTO: 74.5 FL (ref 80–99)
METHADONE UR QL: NEGATIVE
MONOCYTES # BLD: 0.3 K/UL (ref 0–1)
MONOCYTES NFR BLD: 13 % (ref 5–13)
NEUTS SEG # BLD: 1.5 K/UL (ref 1.8–8)
NEUTS SEG NFR BLD: 56 % (ref 32–75)
NITRITE UR QL STRIP.AUTO: NEGATIVE
NRBC # BLD: 0.11 K/UL (ref 0–0.01)
NRBC BLD-RTO: 4.3 PER 100 WBC
OPIATES UR QL: NEGATIVE
PCP UR QL: NEGATIVE
PH UR STRIP: 5 [PH] (ref 5–8)
PLATELET # BLD AUTO: 66 K/UL (ref 150–400)
PMV BLD AUTO: ABNORMAL FL (ref 8.9–12.9)
POTASSIUM SERPL-SCNC: 4.9 MMOL/L (ref 3.5–5.1)
PROT SERPL-MCNC: 7.3 G/DL (ref 6.4–8.2)
PROT UR STRIP-MCNC: 30 MG/DL
RBC # BLD AUTO: 4.43 M/UL (ref 3.8–5.2)
RBC #/AREA URNS HPF: ABNORMAL /HPF (ref 0–5)
RBC MORPH BLD: ABNORMAL
SALICYLATES SERPL-MCNC: <1.7 MG/DL (ref 2.8–20)
SODIUM SERPL-SCNC: 149 MMOL/L (ref 136–145)
SP GR UR REFRACTOMETRY: 1.01 (ref 1–1.03)
UA: UC IF INDICATED,UAUC: ABNORMAL
UROBILINOGEN UR QL STRIP.AUTO: 0.2 EU/DL (ref 0.2–1)
WBC # BLD AUTO: 2.6 K/UL (ref 3.6–11)
WBC URNS QL MICRO: ABNORMAL /HPF (ref 0–4)

## 2021-01-01 PROCEDURE — 74011250636 HC RX REV CODE- 250/636: Performed by: EMERGENCY MEDICINE

## 2021-01-01 PROCEDURE — 96361 HYDRATE IV INFUSION ADD-ON: CPT

## 2021-01-01 PROCEDURE — 99285 EMERGENCY DEPT VISIT HI MDM: CPT

## 2021-01-01 PROCEDURE — 65270000029 HC RM PRIVATE

## 2021-01-01 PROCEDURE — 71250 CT THORAX DX C-: CPT

## 2021-01-01 PROCEDURE — 36415 COLL VENOUS BLD VENIPUNCTURE: CPT

## 2021-01-01 PROCEDURE — 96376 TX/PRO/DX INJ SAME DRUG ADON: CPT

## 2021-01-01 PROCEDURE — 81001 URINALYSIS AUTO W/SCOPE: CPT

## 2021-01-01 PROCEDURE — 80179 DRUG ASSAY SALICYLATE: CPT

## 2021-01-01 PROCEDURE — 3336500001 HSPC ELECTION

## 2021-01-01 PROCEDURE — C9113 INJ PANTOPRAZOLE SODIUM, VIA: HCPCS | Performed by: NURSE PRACTITIONER

## 2021-01-01 PROCEDURE — 74011250636 HC RX REV CODE- 250/636: Performed by: NURSE PRACTITIONER

## 2021-01-01 PROCEDURE — 80307 DRUG TEST PRSMV CHEM ANLYZR: CPT

## 2021-01-01 PROCEDURE — 82077 ASSAY SPEC XCP UR&BREATH IA: CPT

## 2021-01-01 PROCEDURE — 0656 HSPC GENERAL INPATIENT

## 2021-01-01 PROCEDURE — 96375 TX/PRO/DX INJ NEW DRUG ADDON: CPT

## 2021-01-01 PROCEDURE — 74011000250 HC RX REV CODE- 250: Performed by: NURSE PRACTITIONER

## 2021-01-01 PROCEDURE — 70450 CT HEAD/BRAIN W/O DYE: CPT

## 2021-01-01 PROCEDURE — 99222 1ST HOSP IP/OBS MODERATE 55: CPT | Performed by: FAMILY MEDICINE

## 2021-01-01 PROCEDURE — 74011000258 HC RX REV CODE- 258: Performed by: EMERGENCY MEDICINE

## 2021-01-01 PROCEDURE — 80053 COMPREHEN METABOLIC PANEL: CPT

## 2021-01-01 PROCEDURE — 82140 ASSAY OF AMMONIA: CPT

## 2021-01-01 PROCEDURE — 83605 ASSAY OF LACTIC ACID: CPT

## 2021-01-01 PROCEDURE — 85025 COMPLETE CBC W/AUTO DIFF WBC: CPT

## 2021-01-01 PROCEDURE — 74011000250 HC RX REV CODE- 250: Performed by: EMERGENCY MEDICINE

## 2021-01-01 PROCEDURE — 71045 X-RAY EXAM CHEST 1 VIEW: CPT

## 2021-01-01 PROCEDURE — 80143 DRUG ASSAY ACETAMINOPHEN: CPT

## 2021-01-01 PROCEDURE — 74011250636 HC RX REV CODE- 250/636: Performed by: INTERNAL MEDICINE

## 2021-01-01 PROCEDURE — 96365 THER/PROPH/DIAG IV INF INIT: CPT

## 2021-01-01 PROCEDURE — 87040 BLOOD CULTURE FOR BACTERIA: CPT

## 2021-01-01 PROCEDURE — 74176 CT ABD & PELVIS W/O CONTRAST: CPT

## 2021-01-01 PROCEDURE — 96367 TX/PROPH/DG ADDL SEQ IV INF: CPT

## 2021-01-01 PROCEDURE — 74011250637 HC RX REV CODE- 250/637: Performed by: EMERGENCY MEDICINE

## 2021-01-01 PROCEDURE — 75810000275 HC EMERGENCY DEPT VISIT NO LEVEL OF CARE

## 2021-01-01 RX ORDER — SCOLOPAMINE TRANSDERMAL SYSTEM 1 MG/1
1 PATCH, EXTENDED RELEASE TRANSDERMAL
Status: DISCONTINUED | OUTPATIENT
Start: 2021-01-01 | End: 2021-01-01 | Stop reason: HOSPADM

## 2021-01-01 RX ORDER — HYDROMORPHONE HYDROCHLORIDE 1 MG/ML
0.5 INJECTION, SOLUTION INTRAMUSCULAR; INTRAVENOUS; SUBCUTANEOUS
Status: DISCONTINUED | OUTPATIENT
Start: 2021-01-01 | End: 2021-01-01 | Stop reason: HOSPADM

## 2021-01-01 RX ORDER — GLYCOPYRROLATE 0.2 MG/ML
0.2 INJECTION INTRAMUSCULAR; INTRAVENOUS
Status: DISCONTINUED | OUTPATIENT
Start: 2021-01-01 | End: 2021-01-01 | Stop reason: HOSPADM

## 2021-01-01 RX ORDER — MIDAZOLAM HYDROCHLORIDE 1 MG/ML
5 INJECTION, SOLUTION INTRAMUSCULAR; INTRAVENOUS
Status: COMPLETED | OUTPATIENT
Start: 2021-01-01 | End: 2021-01-01

## 2021-01-01 RX ORDER — GLYCOPYRROLATE 0.2 MG/ML
0.2 INJECTION INTRAMUSCULAR; INTRAVENOUS
Status: COMPLETED | OUTPATIENT
Start: 2021-01-01 | End: 2021-01-01

## 2021-01-01 RX ORDER — FACIAL-BODY WIPES
10 EACH TOPICAL DAILY PRN
Status: DISCONTINUED | OUTPATIENT
Start: 2021-01-01 | End: 2021-01-01 | Stop reason: HOSPADM

## 2021-01-01 RX ORDER — MORPHINE SULFATE 2 MG/ML
4 INJECTION, SOLUTION INTRAMUSCULAR; INTRAVENOUS
Status: COMPLETED | OUTPATIENT
Start: 2021-01-01 | End: 2021-01-01

## 2021-01-01 RX ORDER — LORAZEPAM 2 MG/ML
2 INJECTION INTRAMUSCULAR
Status: COMPLETED | OUTPATIENT
Start: 2021-01-01 | End: 2021-01-01

## 2021-01-01 RX ORDER — ONDANSETRON 2 MG/ML
4 INJECTION INTRAMUSCULAR; INTRAVENOUS
Status: COMPLETED | OUTPATIENT
Start: 2021-01-01 | End: 2021-01-01

## 2021-01-01 RX ORDER — ONDANSETRON 2 MG/ML
4 INJECTION INTRAMUSCULAR; INTRAVENOUS
Status: DISCONTINUED | OUTPATIENT
Start: 2021-01-01 | End: 2021-01-01 | Stop reason: HOSPADM

## 2021-01-01 RX ORDER — OXYMETAZOLINE HCL 0.05 %
2 SPRAY, NON-AEROSOL (ML) NASAL
Status: DISCONTINUED | OUTPATIENT
Start: 2021-01-01 | End: 2021-01-01 | Stop reason: HOSPADM

## 2021-01-01 RX ORDER — METRONIDAZOLE 500 MG/100ML
500 INJECTION, SOLUTION INTRAVENOUS
Status: COMPLETED | OUTPATIENT
Start: 2021-01-01 | End: 2021-01-01

## 2021-01-01 RX ORDER — LORAZEPAM 2 MG/ML
1 INJECTION INTRAMUSCULAR
Status: DISCONTINUED | OUTPATIENT
Start: 2021-01-01 | End: 2021-01-01 | Stop reason: HOSPADM

## 2021-01-01 RX ORDER — KETOROLAC TROMETHAMINE 30 MG/ML
30 INJECTION, SOLUTION INTRAMUSCULAR; INTRAVENOUS
Status: DISCONTINUED | OUTPATIENT
Start: 2021-01-01 | End: 2021-01-01 | Stop reason: HOSPADM

## 2021-01-01 RX ORDER — ACETAMINOPHEN 650 MG/1
650 SUPPOSITORY RECTAL
Status: DISCONTINUED | OUTPATIENT
Start: 2021-01-01 | End: 2021-01-01 | Stop reason: HOSPADM

## 2021-01-01 RX ORDER — MORPHINE SULFATE 2 MG/ML
4 INJECTION, SOLUTION INTRAMUSCULAR; INTRAVENOUS
Status: DISCONTINUED | OUTPATIENT
Start: 2021-01-01 | End: 2021-01-01

## 2021-01-01 RX ORDER — LORAZEPAM 2 MG/ML
1 INJECTION INTRAMUSCULAR
Status: DISCONTINUED | OUTPATIENT
Start: 2021-01-01 | End: 2021-01-01

## 2021-01-01 RX ORDER — MORPHINE SULFATE 2 MG/ML
2 INJECTION, SOLUTION INTRAMUSCULAR; INTRAVENOUS
Status: COMPLETED | OUTPATIENT
Start: 2021-01-01 | End: 2021-01-01

## 2021-01-01 RX ORDER — LIDOCAINE HYDROCHLORIDE 20 MG/ML
JELLY TOPICAL
Status: COMPLETED | OUTPATIENT
Start: 2021-01-01 | End: 2021-01-01

## 2021-01-01 RX ORDER — ACETAMINOPHEN 325 MG/1
650 TABLET ORAL
Status: DISCONTINUED | OUTPATIENT
Start: 2021-01-01 | End: 2021-01-01 | Stop reason: HOSPADM

## 2021-01-01 RX ORDER — SODIUM CHLORIDE 0.9 % (FLUSH) 0.9 %
5 SYRINGE (ML) INJECTION AS NEEDED
Status: DISCONTINUED | OUTPATIENT
Start: 2021-01-01 | End: 2021-01-01 | Stop reason: HOSPADM

## 2021-01-01 RX ADMIN — ONDANSETRON 4 MG: 2 INJECTION INTRAMUSCULAR; INTRAVENOUS at 22:41

## 2021-01-01 RX ADMIN — BENZOCAINE, BUTAMBEN, AND TETRACAINE HYDROCHLORIDE 1 SPRAY: .028; .004; .004 AEROSOL, SPRAY TOPICAL at 23:26

## 2021-01-01 RX ADMIN — SODIUM CHLORIDE 1000 ML: 9 INJECTION, SOLUTION INTRAVENOUS at 22:41

## 2021-01-01 RX ADMIN — MIDAZOLAM HYDROCHLORIDE 5 MG: 1 INJECTION, SOLUTION INTRAMUSCULAR; INTRAVENOUS at 00:37

## 2021-01-01 RX ADMIN — GLYCOPYRROLATE 0.2 MG: 0.2 INJECTION, SOLUTION INTRAMUSCULAR; INTRAVENOUS at 00:37

## 2021-01-01 RX ADMIN — PANTOPRAZOLE SODIUM 40 MG: 40 INJECTION, POWDER, FOR SOLUTION INTRAVENOUS at 00:13

## 2021-01-01 RX ADMIN — MORPHINE SULFATE 4 MG: 2 INJECTION, SOLUTION INTRAMUSCULAR; INTRAVENOUS at 00:37

## 2021-01-01 RX ADMIN — ONDANSETRON 4 MG: 2 INJECTION INTRAMUSCULAR; INTRAVENOUS at 23:06

## 2021-01-01 RX ADMIN — METRONIDAZOLE 500 MG: 500 INJECTION, SOLUTION INTRAVENOUS at 22:41

## 2021-01-01 RX ADMIN — LORAZEPAM 2 MG: 2 INJECTION INTRAMUSCULAR; INTRAVENOUS at 00:14

## 2021-01-01 RX ADMIN — LIDOCAINE HYDROCHLORIDE 1 ML: 20 JELLY TOPICAL at 23:26

## 2021-01-01 RX ADMIN — LORAZEPAM 1 MG: 2 INJECTION INTRAMUSCULAR; INTRAVENOUS at 02:10

## 2021-01-01 RX ADMIN — PROCHLORPERAZINE EDISYLATE 10 MG: 5 INJECTION INTRAMUSCULAR; INTRAVENOUS at 23:06

## 2021-01-01 RX ADMIN — NASAL DECONGESTANT 2 SPRAY: 0.05 SPRAY NASAL at 23:26

## 2021-01-01 RX ADMIN — MORPHINE SULFATE 2 MG: 2 INJECTION, SOLUTION INTRAMUSCULAR; INTRAVENOUS at 22:41

## 2021-01-01 RX ADMIN — CEFTRIAXONE 1 G: 1 INJECTION, POWDER, FOR SOLUTION INTRAMUSCULAR; INTRAVENOUS at 21:03

## 2021-01-18 NOTE — PROGRESS NOTES
Patient a resident at [facility]. Patient had ground level fall that was unwitnessed on 01/07/2021. At that time patient had some mild altered mental status. No evidence of head trauama, laceration, swelling or bruising. Over the progression of the last week patient has progressively worsened with confusion. She will follow simple commands, eyes PERRLA, patient has dysphonia, but once prompted speech clears up. Has chronic right hand contracture. Still purposefully using upper extremities.  Will send

## 2021-01-20 PROBLEM — G93.40 ACUTE ENCEPHALOPATHY: Status: ACTIVE | Noted: 2021-01-01

## 2021-01-20 PROBLEM — E87.0 HYPERNATREMIA: Status: ACTIVE | Noted: 2021-01-01

## 2021-01-20 PROBLEM — K92.2 GI BLEED: Status: ACTIVE | Noted: 2021-01-01

## 2021-01-20 PROBLEM — K92.2 ACUTE UPPER GI BLEED: Status: ACTIVE | Noted: 2021-01-01

## 2021-01-20 PROBLEM — N17.9 AKI (ACUTE KIDNEY INJURY) (HCC): Status: ACTIVE | Noted: 2021-01-01

## 2021-01-20 NOTE — H&P
[facility] [facility] Those in [facility] 
[phone] Patient Name: Edna Ramesh YOB: 1927 Date of Provider Hospice Visit: 01/20/21 Level of Care:   [x] General Inpatient (GIP)    [] Routine   [] Respite Current Location of Care: 
[] [facility] [] [facility] [x] [facility] [] [facility] [] Hospice [facility], patient referred from: 
[] [facility] [] [facility] [] [facility] [] [facility] [] Home [] Other:  
 
Date of Original Hospice Admission: 1/20/21 Hospice Medical Director at time of admission: Yanna Parekh Principle Hospice Diagnosis: Acute GI bleed Diagnoses RELATED to the terminal prognosis: Met encephalopathy, AD, PAF Other Diagnoses: Colten Ramesh is a 80y.o. year old who was admitted to [facility]. Patient presented to the ED with upper GI bleed, hypernatremia, encephalopathy. Patient with advanced AD as well. CT scan with diffuse enlargment of the esophagus and possible cystic mass in the pancreas. Patient struggled with NGT placement and also with respiratory distress. Recommended intubation and NGT placement but family elected comfort care with support of hospice The patient's principle diagnosis has resulted in respiratory failure, bleeding Refer to LCD Functionally, the patient's Karnofsky and/or Palliative Performance Scale has declined over a period of weeks and is estimated at 10. The patient is dependent on the following ADLs:all Objective information that support this patients limited prognosis includes:  
CT scan-see above The patient/family chose comfort measures with the support of Hospice. HOSPICE DIAGNOSES Active Symptoms: 
1. SOB 2. Restlessness with agitation 3. Hospice care patient 4. AD 
5. Upper GI bleed PLAN 1. Admitted GIP for frequent nursing assessments, IV medication management and patient is not safe to transition to home with bleeding 2. Start with prn medications for comfort but suspect will need scheduled medications if she survives today 3. Discussed with bedside nurse, hospice team and Hospitalist 
4. [place] to talk with family 5.  and SW to support family needs 6. Disposition: likely will die in the hospital 
7. Hospice Plan of care was reviewed in detail and agree with current plan of care Prognosis estimated based on 01/20/21 clinical assessment is:  
[x] Hours to Days   
[] Days to Weeks   
[] Other: 
 
Communicated plan of care with: Hospice Case Manager; Hospice IDT; Care Team 
 
 GOALS OF CARE Patient/Medical POA stated Goal of Care: hospice care 
 
[] I have reviewed and/or updated ACP information in the Advance Care Planning Navigator. This information is available in the [address] link in the patient's chart header. Primary Decision Maker ([address]):  
 
Resuscitation Status: DNR If DNR is there a Durable DNR on file? : [x] Yes [] No (If no, complete Durable DNR) HISTORY History obtained from: chart, bedside nurse, hospitalist 
 
CHIEF COMPLAINT: bleeding The patient is:  
[] Verbal 
[] Nonverbal 
[x] Unresponsive HPI/SUBJECTIVE:  Minimally responsive, dry blood in her nose and mouth, shows WOB REVIEW OF SYSTEMS The following systems were: [] reviewed  [x] unable to be reviewed Positive ROS include: 
Constitutional: fatigue, weakness, in pain, short of breath Ears/nose/mouth/throat: increased airway secretions Respiratory:shortness of breath, wheezing Gastrointestinal:poor appetite, nausea, vomiting, abdominal pain, constipation, diarrhea Musculoskeletal:pain, deformities, swelling legs Neurologic:confusion, hallucinations, weakness Psychiatric:anxiety, feeling depressed, poor sleep Endocrine:  
 
Adult Non-Verbal Pain Assessment Score: 7 Face 
[] 0   No particular expression or smile 
[] 1   Occasional grimace, tearing, frowning, wrinkled forehead [x] 2   Frequent grimace, tearing, frowning, wrinkled forehead Activity (movement) [] 0   Lying quietly, normal position 
[] 1   Seeking attention through movement or slow, cautious movement 
[x] 2   Restless, excessive activity and/or withdrawal reflexes Guarding 
[] 0   Lying quietly, no positioning of hands over areas of body [x] 1   Splinting areas of the body, tense 
[] 2   Rigid, stiff Physiology (vital signs) 
[] 0   Stable vital signs [x] 1   Change in any of the following: SBP > 20mm Hg; HR > 20/minute 
[] 2   Change in any of the following: SBP > 30mm Hg; HR > 25/minute Respiratory 
[] 0   Baseline RR/SpO2, compliant with ventilator 
[] 1   RR > 10 above baseline, or 5% drop SpO2, mild asynchrony with ventilator 
[x] 2   RR > 20 above baseline, or 10% drop SpO2, asynchrony with ventilator FUNCTIONAL ASSESSMENT Palliative Performance Scale (PPS):10 
 
 
 PSYCHOSOCIAL/SPIRITUAL ASSESSMENT Active Problems: 
  GI bleed (1/20/2021) Past Medical History:  
Diagnosis Date  Alzheimer's disease ([facility] [address]  CHF (congestive heart failure) ([facility] [address]  Dementia ([facility] [address]  Diabetes ([facility] [address] type II  
 Elevated troponin 12/11/2015  Essential hypertension  Fall as cause of accidental injury in residential institution as place of occurrence 12/11/2015  GERD (gastroesophageal reflux disease)  Heart failure ([facility] [address]  Hyperlipidemia  Hyperlipidemia  Hypoglycemia  Ill-defined condition   
 embolism (DVT)  Paroxysmal atrial fibrillation (HCC)  Psychiatric disorder   
 anxiety Past Surgical History:  
Procedure Laterality Date  HX APPENDECTOMY Social History Tobacco Use  Smoking status: Never Smoker  Smokeless tobacco: Never Used Substance Use Topics  Alcohol use: No  
 
No family history on file. Allergies Allergen Reactions  Influenza Virus Vaccine, Specific Hives  Penicillins Unknown (comments)  Sulfacetamide Unknown (comments) Current Facility-Administered Medications Medication Dose Route Frequency  LORazepam (ATIVAN) injection 1 mg  1 mg IntraVENous Q15MIN PRN  
 ketorolac (TORADOL) injection 30 mg  30 mg IntraVENous Q8H PRN  
 glycopyrrolate (ROBINUL) injection 0.2 mg  0.2 mg IntraVENous Q4H PRN  
 bisacodyL (DULCOLAX) suppository 10 mg  10 mg Rectal DAILY PRN  
 HYDROmorphone (PF) (DILAUDID) injection 0.5 mg  0.5 mg IntraVENous Q15MIN PRN  
 sodium chloride (NS) flush 5 mL  5 mL IntraVENous PRN PHYSICAL EXAM  
 
Wt Readings from Last 3 Encounters:  
01/19/21 76.3 kg (168 lb 3.4 oz) 03/23/20 76.1 kg (167 lb 12.8 oz) 01/16/20 86.7 kg (191 lb 1.6 oz) Visit Vitals /68 (BP 1 Location: Right arm, BP Patient Position: At rest) Pulse 60 Temp 98.1 °F (36.7 °C) Resp 20 SpO2 (!) 66% Supplemental O2  [x] Yes  [] NO 10 liters Last bowel movement:  
 
Currently this patient has: 
[x] Peripheral IV [] PICC  [] PORT [] ICD [x] Borrego Catheter [] NG Tube   [] PEG Tube   
[] Rectal Tube [] Drain 
[] Other:  
 
Constitutional:ill appearing, minimally responsive Eyes: reactive ENMT: dry blood in mouth Cardiovascular: tachycardia Respiratory: rr in the low 20s, secretions noted Gastrointestinal: soft Musculoskeletal:muscle wasting Skin:nl Neurologic:nonfocal but minimally responsive Psychiatric: restless Other:  
 
 
Pertinent Lab and or Imaging Tests: 
Lab Results Component Value Date/Time  Sodium 149 (H) 01/19/2021 07:51 PM  
 Potassium 4.9 01/19/2021 07:51 PM  
 Chloride 119 (H) 01/19/2021 07:51 PM  
 CO2 27 01/19/2021 07:51 PM  
 Anion gap 3 (L) 01/19/2021 07:51 PM  
 Glucose 149 (H) 01/19/2021 07:51 PM  
 BUN 71 (H) 01/19/2021 07:51 PM  
 Creatinine 2.29 (H) 01/19/2021 07:51 PM  
 BUN/Creatinine ratio 31 (H) 01/19/2021 07:51 PM  
 GFR est AA 24 (L) 01/19/2021 07:51 PM  
 GFR est non-AA 20 (L) 01/19/2021 07:51 PM  
 Calcium 8.8 01/19/2021 07:51 PM  
 
Lab Results Component Value Date/Time  Protein, total 7.3 01/19/2021 07:51 PM  
 Albumin 3.2 (L) 01/19/2021 07:51 PM  
 
   
 
Total time:  
Counseling / coordination time:  
> 50% counseling / coordination?:

## 2021-01-20 NOTE — PROGRESS NOTES
TRANSFER - IN REPORT: 
 
Verbal report received from Kvng(name) on Gaston Guillory  being received from ED(unit) for hospice care Report consisted of patients Situation, Background, Assessment and  
Recommendations(SBAR). Information from the following report(s) SBAR, Kardex, ED Summary, Intake/Output, MAR and Accordion was reviewed with the receiving nurse. Opportunity for questions and clarification was provided. Assessment completed upon patients arrival to unit and care assumed.

## 2021-01-20 NOTE — HOSPICE
[facility] LCSW note: This LCSW met with pts son Rajesh Wilson and several family members to provide support upon pts passing.

## 2021-01-20 NOTE — PROGRESS NOTES
Hospice Attending Chart reviewed and patient examined. Patient is appropriate for GIP level of care. Mindy and I examined patient together and then discussed with bedside nurse and Dr. Connie Lacy. Mindy will attempt to contact family for consents and  comfort order meds are in place until consents can be obtained.

## 2021-01-20 NOTE — DISCHARGE SUMMARY
Hospice Discharge Summary [facility] Good Help to Those in Need Date of Admission: 1/20/2021 Date of Discharge: 1/20/21 John Paul Guaman is a 80y.o. year old who was admitted to [facility] at [facility] with a Hospice diagnosis of GI bleed [K92.2]. The patient's care was focused on comfort and the patient passed away on 1/20/21 [facility]

## 2021-01-20 NOTE — H&P
Hospitalist Admission Note NAME: Saman Santoyo :  1927 MRN:  [id number] Date/Time:  2021 12:59 AM 
 
Patient PCP: Afsaneh Marie MD 
______________________________________________________________________ Given the patient's current clinical presentation, I have a high level of concern for decompensation if discharged from the emergency department. Complex decision making was performed, which includes reviewing the patient's available past medical records, laboratory results, and x-ray films. My assessment of this patient's clinical condition and my plan of care is as follows. Assessment / Plan: 
 
Acute metabolic encephalopathy, POA Acute upper GI bleed, POA Hypernatremia, POA Acute kidney injury, POA Severe sepsis presented with leukopenia, hypothermia, POA Alzheimer's disease Paroxysmal A. fib Diabetes mellitus Presented from nursing home due to change in mentation CT chest revealed 1. Moderate right and small left pleural effusions. 2. Dependent atelectasis versus consolidation in basilar lower lobes 
bilaterally. 3. Diffuse transaxial enlargement of the esophagus which is not otherwise 
characterized. 4. Moderate cardiomegaly. 5. Cystic mass lesion of the inferior aspect of the body of the pancreas 
measuring 4.6 x 2 x 4.4 cm in size. 6. Small-moderate hiatal hernia. 7. Numerous calcified uterine leiomyomas. Was having bleeding from upper GI, GI called and recommended NG tube however was unable to advance by ED physician. ICU called and recommended intubation but family decided to obtain all the comfort care measurements. Was found to be hyponatremic 146 and an acute kidney injury. Family at bedside, daughter and son, they want only comfort care measurements without obtaining aggressive labs. Chronically on warfarin for A. fib CT head with no acute intracranial processes Hospice consultation Code Status: DNR-CC 
 Surrogate Decision Maker: Her son Zak Cedeño. I talked to him personally and he wanted his mother to be treated with comfort care measures consulted. GI Prophylaxis: not indicated Baseline: Underlying dementia lives in nursing home Subjective: CHIEF COMPLAINT: Acute indentation HISTORY OF PRESENT ILLNESS:    
 
The patient is a 51-year-old woman with past medical history significant for paroxysmal A. fib chronically on warfarin, Alzheimer's disease presented from nursing home facility due to acute change in mentation as they reported to the emergency department. Upon arrival to the emergency department patient was found to have upper GI bleed for which GI was called. GI recommended to the ER doctor to advance NG tube however ED physician tried and they were not able to do so. CT chest revealed dilation in the esophagus, ICU attending was called by ED physician and recommended intubation for EGD however family wanted only comfort care measurement at this point. We were asked to admit for work up and evaluation of the above problems. Past Medical History:  
Diagnosis Date  Alzheimer's disease ([facility] [address]  CHF (congestive heart failure) ([facility] [address]  Dementia ([facility] [address]  Diabetes ([facility] [address] type II  
 Elevated troponin 12/11/2015  Essential hypertension  Fall as cause of accidental injury in residential institution as place of occurrence 12/11/2015  GERD (gastroesophageal reflux disease)  Heart failure ([facility] [address]  Hyperlipidemia  Hyperlipidemia  Hypoglycemia  Ill-defined condition   
 embolism (DVT)  Paroxysmal atrial fibrillation (HCC)  Psychiatric disorder   
 anxiety Past Surgical History:  
Procedure Laterality Date  HX APPENDECTOMY Social History Tobacco Use  Smoking status: Never Smoker  Smokeless tobacco: Never Used Substance Use Topics  Alcohol use: No  
  
 
No family history on file. Allergies Allergen Reactions  Influenza Virus Vaccine, Specific Hives  Penicillins Unknown (comments)  Sulfacetamide Unknown (comments) Prior to Admission medications Medication Sig Start Date End Date Taking? Authorizing Provider  
warfarin (COUMADIN) 2.5 mg tablet Take 2.5 mg by mouth two (2) days a week. Patient takes 5 mg M,W,TH,SAT, SUN and takes 2.5 mg every TUES and FRI    Provider, Historical  
albuterol-ipratropium (DUO-NEB) 2.5 mg-0.5 mg/3 ml nebu 3 mL by Nebulization route every four (4) hours as needed for Wheezing. Provider, Historical  
zinc oxide (SECURA EXTRA PROTECTIVE) 30.6 % topical cream Apply  to affected area four (4) times daily as needed for Skin Irritation (to right buttock). Provider, Historical  
insulin lispro (HUMALOG U-100 INSULIN) 100 unit/mL injection 5 Units by SubCUTAneous route three (3) times daily (with meals). Provider, Historical  
alendronate (FOSAMAX) 70 mg tablet Take 70 mg by mouth every seven (7) days. Provider, Historical  
pantoprazole (PROTONIX) 40 mg tablet Take 40 mg by mouth daily. Provider, Historical  
torsemide (DEMADEX) 100 mg tablet Take 100 mg by mouth daily. Provider, Historical  
acetaminophen (TYLENOL) 325 mg tablet Take 650 mg by mouth every six (6) hours as needed for Pain. Provider, Historical  
allopurinol (ZYLOPRIM) 100 mg tablet Take 100 mg by mouth daily. Provider, Historical  
amLODIPine (NORVASC) 10 mg tablet Take 10 mg by mouth daily. Provider, Historical  
warfarin (COUMADIN) 5 mg tablet Take 5 mg by mouth five (5) days a week. Patient takes 5 mg M,W,TH,SAT, SUN and takes 2.5 mg every TUES and FRI    Provider, Historical  
carvedilol (COREG) 3.125 mg tablet Take 3.125 mg by mouth two (2) times daily (with meals). 12/15/15   Argelia Estrella MD  
ferrous sulfate (IRON) 325 mg (65 mg iron) EC tablet Take 325 mg by mouth Before breakfast and dinner.     Doug Dumont MD  
 benztropine (COGENTIN) 1 mg tablet Take 1 mg by mouth two (2) times a day. Provider, Historical  
memantine ER (NAMENDA XR) 28 mg capsule Take 28 mg by mouth daily. Provider, Historical  
potassium chloride (KAON 10%) 20 mEq/15 mL solution Take 20 mEq by mouth daily. Provider, Historical  
pravastatin (PRAVACHOL) 20 mg tablet Take 20 mg by mouth nightly. Provider, Historical  
 
 
REVIEW OF SYSTEMS:    
I am not able to complete the review of systems because: The patient is intubated and sedated  
x The patient has altered mental status due to his acute medical problems The patient has baseline aphasia from prior stroke(s) The patient has baseline dementia and is not reliable historian The patient is in acute medical distress and unable to provide information Total of 12 systems reviewed as follows:   
   POSITIVE= underlined text  Negative = text not underlined General:  fever, chills, sweats, generalized weakness, weight loss/gain,  
   loss of appetite Eyes:    blurred vision, eye pain, loss of vision, double vision ENT:    rhinorrhea, pharyngitis Respiratory:   cough, sputum production, SOB, BUTTS, wheezing, pleuritic pain  
Cardiology:   chest pain, palpitations, orthopnea, PND, edema, syncope Gastrointestinal:  abdominal pain , N/V, diarrhea, dysphagia, constipation, bleeding Genitourinary:  frequency, urgency, dysuria, hematuria, incontinence Muskuloskeletal :  arthralgia, myalgia, back pain Hematology:  easy bruising, nose or gum bleeding, lymphadenopathy Dermatological: rash, ulceration, pruritis, color change / jaundice Endocrine:   hot flashes or polydipsia Neurological:  headache, dizziness, confusion, focal weakness, paresthesia, Speech difficulties, memory loss, gait difficulty Psychological: Feelings of anxiety, depression, agitation Objective: VITALS:   
Visit Vitals BP (!) 104/53 Pulse (!) 57 Temp (!) 91.3 °F (32.9 °C) Resp 16  
 Ht 5' 5\" (1.651 m) Wt 76.3 kg (168 lb 3.4 oz) SpO2 94% BMI 27.99 kg/m² PHYSICAL EXAM: 
 
General:    Not alert and cooperative, no distress, appears stated age. HEENT: Atraumatic, anicteric sclerae, pale conjunctivae No oral ulcers, mucosa dry , throat clear, dentition fair Neck:  Supple, symmetrical,  thyroid: non tender Lungs:   Clear to auscultation bilaterally. No Wheezing or Rhonchi. No rales. Chest wall:  No tenderness  No Accessory muscle use. Heart:   Regular  rhythm,  No  murmur   No edema Abdomen:   Soft, non-tender. Not distended. Bowel sounds normal 
Extremities: No cyanosis. No clubbing,   
  Skin turgor normal, Capillary refill normal, Radial dial pulse 2+ Skin:     Not pale. Not Jaundiced  No rashes Psych:  Poor insight. Not depressed. Not anxious or agitated. Neurologic: Closing her eyes, not following commands, unable to perform neurological examination due to acute encephalopathy 
_______________________________________________________________________ Care Plan discussed with: 
  Comments Patient Family  x   
RN x Care Manager Consultant:     
_______________________________________________________________________ 
 
________________________________________________________________________ TOTAL TIME:  55 Minutes Critical Care Provided     Minutes non procedure based Comments  
 x Reviewed previous records  
>50% of visit spent in counseling and coordination of care x Discussion with patient and/or family and questions answered 
  
 
________________________________________________________________________ Signed: Sandy Sandifer, MD 
 
Procedures: see electronic medical records for all procedures/Xrays and details which were not copied into this note but were reviewed prior to creation of Plan. LAB DATA REVIEWED:   
Recent Results (from the past 24 hour(s)) CBC WITH AUTOMATED DIFF  Collection Time: 01/19/21  7:51 PM  
 Result Value Ref Range WBC 2.6 (L) 3.6 - 11.0 K/uL  
 RBC 4.43 3.80 - 5.20 M/uL  
 HGB 10.2 (L) 11.5 - 16.0 g/dL HCT 33.0 (L) 35.0 - 47.0 % MCV 74.5 (L) 80.0 - 99.0 FL  
 MCH 23.0 (L) 26.0 - 34.0 PG  
 MCHC 30.9 30.0 - 36.5 g/dL  
 RDW 17.4 (H) 11.5 - 14.5 % PLATELET 66 (L) 105 - 400 K/uL MPV ABNORMAL 8.9 - 12.9 FL  
 NRBC 4.3 (H) 0  WBC ABSOLUTE NRBC 0.11 (H) 0.00 - 0.01 K/uL NEUTROPHILS 56 32 - 75 % LYMPHOCYTES 28 12 - 49 % MONOCYTES 13 5 - 13 % EOSINOPHILS 3 0 - 7 % BASOPHILS 0 0 - 1 % IMMATURE GRANULOCYTES 0 0.0 - 0.5 % ABS. NEUTROPHILS 1.5 (L) 1.8 - 8.0 K/UL  
 ABS. LYMPHOCYTES 0.7 (L) 0.8 - 3.5 K/UL  
 ABS. MONOCYTES 0.3 0.0 - 1.0 K/UL  
 ABS. EOSINOPHILS 0.1 0.0 - 0.4 K/UL  
 ABS. BASOPHILS 0.0 0.0 - 0.1 K/UL  
 ABS. IMM. GRANS. 0.0 0.00 - 0.04 K/UL  
 DF SMEAR SCANNED    
 RBC COMMENTS TARGET CELLS 1+ RBC COMMENTS MICROCYTOSIS 1+ 
    
 RBC COMMENTS OVALOCYTES PRESENT 
    
METABOLIC PANEL, COMPREHENSIVE Collection Time: 01/19/21  7:51 PM  
Result Value Ref Range Sodium 149 (H) 136 - 145 mmol/L Potassium 4.9 3.5 - 5.1 mmol/L Chloride 119 (H) 97 - 108 mmol/L  
 CO2 27 21 - 32 mmol/L Anion gap 3 (L) 5 - 15 mmol/L Glucose 149 (H) 65 - 100 mg/dL BUN 71 (H) 6 - 20 MG/DL Creatinine 2.29 (H) 0.55 - 1.02 MG/DL  
 BUN/Creatinine ratio 31 (H) 12 - 20 GFR est AA 24 (L) >60 ml/min/1.73m2 GFR est non-AA 20 (L) >60 ml/min/1.73m2 Calcium 8.8 8.5 - 10.1 MG/DL Bilirubin, total 0.2 0.2 - 1.0 MG/DL  
 ALT (SGPT) 126 (H) 12 - 78 U/L  
 AST (SGOT) 58 (H) 15 - 37 U/L Alk. phosphatase 173 (H) 45 - 117 U/L Protein, total 7.3 6.4 - 8.2 g/dL Albumin 3.2 (L) 3.5 - 5.0 g/dL Globulin 4.1 (H) 2.0 - 4.0 g/dL A-G Ratio 0.8 (L) 1.1 - 2.2 URINALYSIS W/ REFLEX CULTURE Collection Time: 01/19/21  7:51 PM  
 Specimen: Urine Result Value Ref Range Color YELLOW/STRAW  Appearance CLEAR CLEAR    
 Specific gravity 1.013 1.003 - 1.030    
 pH (UA) 5.0 5.0 - 8.0 Protein 30 (A) NEG mg/dL Glucose Negative NEG mg/dL Ketone Negative NEG mg/dL Bilirubin Negative NEG Blood Negative NEG Urobilinogen 0.2 0.2 - 1.0 EU/dL Nitrites Negative NEG Leukocyte Esterase Negative NEG    
 WBC 0-4 0 - 4 /hpf  
 RBC 0-5 0 - 5 /hpf Epithelial cells FEW FEW /lpf Bacteria Negative NEG /hpf  
 UA:UC IF INDICATED CULTURE NOT INDICATED BY UA RESULT CNI Hyaline cast 2-5 0 - 5 /lpf DRUG SCREEN, URINE Collection Time: 01/19/21  7:51 PM  
Result Value Ref Range AMPHETAMINES Negative NEG    
 BARBITURATES Negative NEG BENZODIAZEPINES Negative NEG    
 COCAINE Negative NEG METHADONE Negative NEG    
 OPIATES Negative NEG    
 PCP(PHENCYCLIDINE) Negative NEG    
 THC (TH-CANNABINOL) Negative NEG Drug screen comment (NOTE) ACETAMINOPHEN Collection Time: 01/19/21  7:51 PM  
Result Value Ref Range Acetaminophen level <2 (L) 10 - 30 ug/mL ETHYL ALCOHOL Collection Time: 01/19/21  7:51 PM  
Result Value Ref Range ALCOHOL(ETHYL),SERUM <42 <08 MG/DL  
SALICYLATE Collection Time: 01/19/21  7:51 PM  
Result Value Ref Range Salicylate level <6.9 (L) 2.8 - 20.0 MG/DL  
AMMONIA Collection Time: 01/19/21  7:51 PM  
Result Value Ref Range Ammonia 10 <32 UMOL/L  
POC LACTIC ACID Collection Time: 01/19/21  8:23 PM  
Result Value Ref Range  Lactic Acid (POC) <0.30 (L) 0.40 - 2.00 mmol/L

## 2021-01-20 NOTE — PROGRESS NOTES
I called the patient's medical power of , Ruel Woodruff, and he reported that he wants his mom to be comfort care measurements only because that is all what she wanted.

## 2021-01-20 NOTE — [facility] IDG SOCIAL WORKER NOTES
Fatoumata Cruz is a 81 y/o AAF with a hospice diagnosis of acute GIB. Pt has multiple comorbidities including AD, A-fib, and CHF. Pt was admitted 1/19 from [facility]. Pt is minimally responsive. LCSW will provide emotional support for son Negrito Quinones and family in processing pts terminality due to GIB. LCSW will provide grief support for Negrito Quinones and family in processing anticipatory grief and reliving grief associated with fathers death and will provide education re the grief process. No FH has been determined; family is in the process of making final arrangements.

## 2021-01-20 NOTE — HOSPICE
[employer] RN note:  Notified by staff RN that pt stopped breathing. Pt noted to be: Without responsive to voice or touch Without spontaneous pulse or respiration after one minute of auscultation Pupils fixed and dilated TOD: 14:46 [facility] Good Help to Those in Need 
[phone] Discharge/Death Nursing Note Patient Name: Tommy Elmore YOB: 1927 Age: 80 y.o. Date of Death: 21 Admitted Date: 2021 Time of Death: 14:46 Facility of Care: [facility] Patient Room: [facility] Hospice Attending: Suraj Patterson MD 
Hospice Diagnosis: GI bleed [K92.2] Death Pronouncement Pronouncement of death completed by: Lito Grider RN Agency staff was present at the time of death At the time of death the patient was documented as: 
Pt noted to be: Without responsive to voice or touch Without spontaneous pulse or respiration after one minute of auscultation Pupils fixed and dilated TOD: 14:46 The pt  within [facility] The following were notified of the patient's death: family Medications were disposed of per facility protocol Discharge Summary Discharge Reason: Death Summary of Care Provided: 
 
[x] Post mortem care provided by staff RN [x] Notification of  home by nursing supervisor 
[x] Referrals/Community resources provided:  
[x] Goals completed 
[] Durable Medical Equipment vendor notified Disciplines involved: [x] RN [x] SW []  [] HA [] Vol [] PT [] OT [] ST [] BC 
 
[] IDT communication/notification Attending Physician, Dr. Arron Thapa, notified of death Bereaved Verify bereaved identified with name, address, telephone number and risk level Advance Care Planning 3/21/2020 Patient's Healthcare Decision Maker is: - Confirm Advance Directive Yes, on file Does the patient have other document types -

## 2021-01-20 NOTE — ED NOTES
Pt has had multiple episodes of vomiting, pt currently has emesis mixed w/ blood. Suctioning as needed. Providers aware

## 2021-01-20 NOTE — HOSPICE
[facility] Good Help to Those in Need 
[phone] Social Work Admission Note Patient Name: Alvarado Macias YOB: 1927 Age: 80 y.o. Date of Visit: 01/20/21 Facility of Care: [facility] Patient Room: ER22/22 Hospice Attending: Matthew Pratt MD 
Hospice Diagnosis: GI Bleed Level of Care:  
 [x]  GIP []  Respite 
 []  Routine NARRATIVE Marek Donald is a 79 y/o AAF with a hospice diagnosis of acute GIB. Pt has multiple comorbidities including AD, A-fib, and CHF. Pt was admitted 1/19 from [facility]. Pt is minimally responsive and is imminent. Son Whitney Rivera was at bedside. Pt is  and has 6 children; sons Junior Jasso, who lives in [facility], and daughters Salima Su, who lives in [place], and [facility]. Son reports family is very loving and supportive. Son reports family although, they have not been able to see pt at facility due to [place], they were able to to zoom meetings with pt as a family 2 x per week. LCSW provided supportive listening for son as he talked about pts decline in health the last 4 years, she has been in NH. Prior to that time pt lived in [place] with her son [address] [place] [address] for the past 18 yrs. LCSW provided emotional support for son Wesly Kapoor in processing pts terminality due to GIB. LCSW provided grief support for Wesly Kapoor in processing anticipatory grief and relational loss. LCSW and Wesly Kapoor discussed loss of father many years ago and reliving grief associated with fathers death. No FH has been determined; family is in the process of making final arrangements. Low risk for son Wesly Kapoor. LCSW will continue to assess and monitor pt and family needs. ADVANCE CARE PLANNING Code Status: DDNR Durable DNR: Miryam Black  _ No 
Advance Care Planning 3/21/2020 Patient's Healthcare Decision Maker is: - Confirm Advance Directive Yes, on file Does the patient have other document types - Relationship Status: 
[]  Single    
 []       
[]     
[]  Domestic Partner    
[x]  / 
[]  Common Law 
[]   
[]  Unknown If in a relationship, name of partner/spouse: 
Duration of relationship: 
 
Pentecostal: Gnosticism  Home: TBD Resources Provided: [facility] Social Work Initial Assessment Gender: 
female Race/Ethnicity: (donta all that apply) []  [place] ([facility]) or [place] Native 
[]   
[x]  Black or [place] American 
[]   or  
[]   or [employer] 
[]  [employer]ares 
[]  Unknown 
  
 Service:   
[]  Yes  
[]  No      
[]  Unknown Appropriate for Pinning Ceremony:  
[]  Yes     
[]  No 
Is patient using VA benefits? []  Yes     
[]  No 
  
Primary Language: English  
[]   Needed 
[]   utilized during visit Ability to express thoughts/needs/feelings 
[]  Expressed thoughts/feelings/needs without difficulty 
[]  Requires extra time and cuing 
[]  Speech limited single words 
[]  Uses only gestures (eye, blinking eye or head movement/pointing) []  Unable to express thoughts/feelings/needs (speech unintelligible or inappropriate) [x]  Unresponsive, minimally responsive Notes:  
  
Mental Status: 
[]  Alert-oriented to:   
 []  Person   
 []  Place   
 []  Time 
[]  Comatose-responds to:  
 []   Verbal stimuli  
 []  Tactile stimuli  
 []  Painful stimuli 
[]  Forgetful 
[]  Disoriented/Confused 
[]  Lethargic 
[]  Agitated 
[x]  Other (specify):   
Notes: minimally responsive  
 
  
Patients description of Illness/Current Health Status:   
[x]  Patient unable to discuss, minimally responsive  
 
[]  Patient unwilling to discuss 
[]  (Specify) Knowledge/Understanding of Disease Process Patient:  
 []  Demonstrates knowledge/understanding of disease process 
 []  Demonstrates knowledge/understanding of treatment plan 
 []  Demonstrates knowledge/understanding of prognosis []  Demonstrates acceptance of prognosis []  Demonstrates knowledge/understanding of resuscitation status [x]  Other (specify), minimally responsive Caregiver: 
 [x]  Demonstrates knowledge/understanding of disease process [x]  Demonstrates knowledge/understanding of treatment plan 
 [x]  Demonstrates knowledge/understanding of prognosis [x]  Demonstrates acceptance of prognosis [x]  Demonstrates knowledge/understanding of resuscitation status 
 []  Other (specify) Notes:  
  
Patients living arrangement/care setting: 
Use the PRIOR COLUMN when the PATIENTS current health status necessitated a change in his/her primary residence. Prior Current Response  
           []             []    Patients own home/residence []             []    Home of family member/friend []             []    Boarding home  
           []             []    Assisted living facility/residential center []             []    Hospital/Acute care facility []             []    Skilled nursing facility [x]             []    Long term care facility/Nursing home  
           []             [x]    Hospice in Patient Primary Caregiver: 
[]  No Primary Caregiver Name of Primary Caregiver: Carito Kendrick Relationship or Primary Caregiver:  
 []  Spouse/Significant other     
 [x]  Natural Child      
 []  Step child     
 []  Sibling 
 []  Parent 
 []  Friend/Neighbor 
 []  Community/Episcopal Volunteer 
 []  Paid help 
 []  Other (specify):___________ Notes:   
  
Family members/Significant others: 
Name: Carito Kendrick Relationship: son Phone Number: [phone] Actively involved in care? [x]  Yes  []  No 
 
Name:Joey Isidro Relationship: son Phone Number: [phone] Actively involved in care? [x]  Yes  []  No 
 
Name:Gemma Balbuena Relationship: dtr Phone Number: Actively involved in care? []  Yes  []  No 
 
Social support systems: (select ONE best description) [x]  Excellent social support system which includes three or more family members or friends 
[]  Good social support system which includes two or less members or friends 
[]  [address] support which includes one family member or friend 
[]  Poor social support; no family members or friends; basically ALONE Notes:  
  
Emotional Status: (donta all that apply) Patient Caregiver Response [x]                [x]    Mood/Affect stable and appropriate    
              []                []    Angry  
              []                []    Anxious []                []    Apprehensive []                []    Avoidant  
              []                []    Clinging  
              []                []    Depressed  
              []                []    Distraught  
              []                []    Elated []                []    Euphoric  
              []                []    Fearful  
              []                []    Flat Affect  
              []                []    Helpless []                []    Hostile []                []    Impulsive []                []    Irritable  
              []                []    Labile  
              []                []    Manic  
              []                []    Restlessness []                [x]    Sad  
              []                []    Suspicious []                []    Tearful  
              []                []    Withdrawn Notes:  
 
Coping Skills (strengths/weakness):  
 Patient: Coping Skills (strength/weakness):minimally responsive Family/caregiver (strength/weakness):  Well supported, very close, large suppportive family, pt has AD, has been in NH for 4 yrs, GIB, imminent. 
  
Ephraim of care (donta all that apply):    
[x]  No burden evident  
[]  Family must administer medications  
 []  Illness causing financial strain  
[]  Family/Support feels overwhelmed  
[]  Family/Support sleep disturbed with patients care  
[]  Patients care causes extra physical stress  of death 
[]  Illness causes changes in family lifestyle 
[]  Illness impacting family/support employment 
[]  Family experiencing increased time demands 
[]  Patients behavior endangers family 
[]  Denial of patients illness 
[]  Concern over outcome of illness/fear 
[]  Patients behavior embarrassing to family Notes:  
  
Risk Factors: (donta all that apply):   
[x]  No burden evident  
[]  Alcohol abuse 
[]  Financial resources inadequate to meet basic needs (food/house/etc) []  Financial resources inadequate to meet health care needs (supplies/equipment/medications) 
[]  Food/nutrition resources inadequate 
[]  Home environment unsafe/inadequate for home care 
[]  Homicidal risk 
[]  Lives alone or without concerned relatives 
[]  Multiple medications/complex schedule 
[]  Physical limitations increase likelihood of falls 
[]  Plan of care/treatments complicated 
[]  Substance use/abuse 
[]  Suicidal risk 
[]  Visual impairment threatens safety/ability to perform self-care 
[]  Other (specify): 
  
Abuse/Neglect (actual/potential risks): 
[x]  No signs of abuse/neglect 
[]  History of abuse/neglect                 []  DHAZCDGM          []  Sexual 
[]  History of domestic violence 
[]  Lacks adequate physical care 
[]  Lacks emotional nurturing/support 
[]  Lacks appropriate stimulation/cognitive experiences 
[]  Left alone inappropriately 
[]  Lacks necessary supervision 
[]  Inadequate or delayed medical care 
[]  Unsafe environment (i.e guns/drug use/history of violence in the home/etc.) []  Bruising or other physical signs of injury present 
[]  Other (specify): 
Notes:  
[]  Refer to child/adult protective services Current Sources of Stress (in Addition to Current Illness):  
[x]  None reported 
[]  Bills/Debt   
 []  Career/Job change   
[]   (short term)   
[]   (long term)   
[]  Death of a child (recent)   
[]  Death of a parent (recent)  
[]  Death of a spouse (recent)  
[]  Employment status changed  
[]  Family discord   
[]  Financial loss/Inadequate inther (specify):come 
[]  Job loss 
[]  Legal issues unresolved 
[]  Lifestyle change 
[]  Marital discord 
[]  Marriage within the last year 
[]  Paperwork (insurance/legal/etc) overwhelming 
[]  Separation/Divorce 
[]  Other (specify): 
Notes:  
  
Current Community Resources Being Utilized 1. Interventions/Plan of Care 1. Assess social and emotional factors related to coping with end of life issues 2. Community resource planning/referral  
3. Relocation to different care setting if/when symptoms stabilize 4. Discharge Planning 1. Will return to facility if stable. MSW Assessment Completed by: Cristiane Marroquin 01/20/21 Time In: 11:00 am       
Time Out: 12:30 pm

## 2021-01-20 NOTE — PROGRESS NOTES
This was an initial visit to assess need and offer support. Patient was still in ED in bed and appeared to be resting. Her son Alxeandr Stephens was in waiting room, waiting to go with her to room. He was talking with his sister via phone keeping siblings advised of what was with their mother. The family is Episcopalian. Alexandr Stephens expressed appreciation for spiritual care support but noted no specifie needs at this time.

## 2021-01-20 NOTE — PROGRESS NOTES
Responded to RN page regarding pt Young death on CLIN OBS. Family was present at bedside. Consult with bedside RN, and hospice team. Introduced self as one of the chaplains and conveyed words of support and affirmation from other chaplains known to family. Affirmed gift of presence as they processed acute grief. Assured of prayers and support as needed. Remained on unit providing pastoral care card following chart note. [facility] Spiritual Care Provider  Paging Service [phone])

## 2021-01-20 NOTE — ED NOTES
Pt non communicative at this time. Pt A&O x0. Pt unable to follow commands. assessment not completed d/t pt being unable to participate in care

## 2021-01-20 NOTE — DISCHARGE SUMMARY
Hospitalist Discharge Summary Patient ID: 
Dionne Ruth 
[id number] 
35 y.o. 
11/7/1927 PCP on record: Deysi Sauer MD 
 
Admit date: 1/19/2021 Discharge date and time: 1/20/2021 Admission Diagnoses: Acute upper GI bleed [K92.2] Acute encephalopathy [G93.40] Hypernatremia [E87.0] MIRANDA (acute kidney injury) ([address] [N17.9] Discharge Diagnoses: Active Problems: Hypernatremia (1/20/2021) Acute upper GI bleed (1/20/2021) MIRANDA (acute kidney injury) ([address] (1/20/2021) Acute encephalopathy (1/20/2021) Hospital Course:  
Patient presented with acute GI bleed, hypernatremia, MIRANDA. Per discussion with family Gisselle Mayo, patient was transitioned to Comfort measures. Hospice was consulted and patient was transitioned to general inpatient hospice. CONSULTATIONS: 
IP CONSULT TO GASTROENTEROLOGY Excerpted HPI from H&P of Katarzyna Delgado MD: 
70-year-old woman with past medical history significant for paroxysmal A. fib chronically on warfarin, Alzheimer's disease presented from nursing home facility due to acute change in mentation as they reported to the emergency department. Upon arrival to the emergency department patient was found to have upper GI bleed for which GI was called. GI recommended to the ER doctor to advance NG tube however ED physician tried and they were not able to do so. CT chest revealed dilation in the esophagus, ICU attending was called by ED physician and recommended intubation for EGD however family wanted only comfort care measurement at this point. 
  
We were asked to admit for work up and evaluation of the above problems. ______________________________________________________________________ DISCHARGE SUMMARY/HOSPITAL COURSE:  for full details see H&P, daily progress notes, labs, consult notes. Visit Vitals /60 Pulse 92 Temp 96.8 °F (36 °C) Resp 24 Ht 5' 5\" (1.651 m) Wt 76.3 kg (168 lb 3.4 oz) SpO2 98% BMI 27.99 kg/m² Patient seen and examined by me on discharge day. Pertinent Findings: 
Gen:    Not in distress Chest: Clear lungs CVS:   Regular rhythm. No edema Abd:  Soft, not distended, not tender Neuro:  Alert with good insight. Oriented to person, place, and time  
_______________________________________________________________________ DISCHARGE MEDICATIONS:  
Discharge Medication List as of 1/20/2021 12:06 PM  
  
 
 
My Recommended Diet, Activity, Wound Care, and follow-up labs are listed in the patient's Discharge Insturctions which I have personally completed and reviewed. _______________________________________________________________________ DISPOSITION:    
Home with Family:   
Home with HH/PT/OT/RN:   
SNF/LTC:   
AMRY:   
OTHER: GIP Hospice Condition at Discharge:  Stable 
_______________________________________________________________________ Follow up with: PCP : Dorian Clifford MD 
Follow-up Information Follow up With Specialties Details Why Contact Info Dorian Clifford MD Internal Medicine   [address] [place] [address] [address] [place] [address] 
[phone] Total time in minutes spent coordinating this discharge (includes going over instructions, follow-up, prescriptions, and preparing report for sign off to her PCP) : 35 minutes Signed: 
Gama Grider MD

## 2021-01-20 NOTE — HOSPICE
[facility] RN note:  Consult noted. Reviewing chart, discussed pt with staff RN and attending Dr Wendy Shay. Pt appears dyspneic with general distress despite IV lorazepam, morphine, zofran and robinul. Dr Kacie Loredos in to evaluate, approval for inpt admission at [facility] level of care. Son John Mcneal aware that pt is dying and want her to be comfortable and that all adult children are in agreement. John Mcneal is deferring consent signing to brother Yuli Maria who is en route to the hospital.  [id number] [address] to meet with Yuli Maria upon arrival. 
 
Thank you for the opportunity to care for this pt and family. Please contact hospice at [phone] with any questions or concerns.

## 2021-01-20 NOTE — HOSPICE
[facility] 
[phone] Inpatient Nursing Admission Patient Name: Sven Frazier YOB: 1927 Age: 80 y.o. Date of Hospice Admission: 1/20/2021 Hospice Attending Elected by Patient: Francisca Alcala MD 
Primary Care Physician: Ermias Figueroa MD 
Admitting RN: Geena Renner RN : Romel Booth Level of Care (GIP/Routine/Respite): [facility] Facility of Care: [facility] Patient Room: ER22/22 HOSPICE SUMMARY  
ER Visits/ Hospitalizations in past year:  
Hospice Diagnosis: GI bleed [K92.2] Onset Date of Hospice Diagnosis: 1/20/21 Summary of Disease Progression Leading to Hospice Diagnosis:  
 
Co-Morbidities:  
Patient Active Problem List  
Diagnosis Code  Short of breath on exertion R06.02  
 Bradycardia R00.1  Hyperlipidemia E78.5  Essential hypertension I10  Dementia ([address] F03.90  
 Diabetes ([address] E11.9  Paroxysmal atrial fibrillation ([facility]) I48.0  Fall as cause of accidental injury in residential institution as place of occurrence W19. [place] Uribe, Y92.10  Elevated troponin R77.8  Alzheimer's disease ([facility] G30.9, F02.80  
 CHF (congestive heart failure) ([facility]) I50.9  Closed fracture of right hip ([address] S72.001A  SSS (sick sinus syndrome) ([facility]) I49.5  Tachy-kate syndrome ([facility]) I49.5  Hypoglycemia E16.2  Confusion R41.0  Hypernatremia E87.0  Acute upper GI bleed K92.2  MIRANDA (acute kidney injury) ([address] N17.9  Acute encephalopathy G93.40  GI bleed K92.2 Diagnoses RELATED to the terminal prognosis: GI bleed Other Diagnoses: Alzheimers Rationale for a prognosis of life expectancy of 6 months or less if the disease follows its normal course (Disease Specific History):  
 
 Gaston Guillory is a 80 y.o. who was admitted to [facility]. The patient's principle diagnosis of GI bleed in setting dementia has resulted in unresponsiveness and active dying. Functionally, the patient's Palliative Performance Scale has declined over a period of days and is estimated at 10% Objective information that support this patients limited prognosis includes: The patient/family chose comfort measures with the support of Hospice. Patient meets for GIP LOC as evidenced by  
 
Prognosis estimated based on 01/20/21 clinical assessment is:  
[x] Few to Many Hours [] Hours to Days  
[] Few to Many Days  
[] Days to Weeks  
[] Few to Many Weeks  
[] Weeks to Months  
[] Few to Many Months ASSESSMENT Patient self-reports:  []  Yes    [x] No 
 
SYMPTOMS: resp distress, generalized pain, GI bleed with potential for vomiting blood SIGNS/PHYSICAL FINDINGS: pt is unresponsive KARNOFSKY: 10% FAST for all dementia:   
 
Learning Assessment: 
Patient  N/A Is patient willing/able to learn? What is the highest level of education completed? Learning preference (written material, demonstration, visual)? Learning barriers (ESOL, Mi'kmaq, poor vision)? Caregiver Is caregiver willing to learn care for patient? yes What is the highest level of education completed?  university Learning preference (written material, demonstration, visual)? demonstration Learning barriers (ESOL, [place]'kmaq, poor vision)? None expressed CLINICAL INFORMATION Wt Readings from Last 3 Encounters:  
01/19/21 76.3 kg (168 lb 3.4 oz) 03/23/20 76.1 kg (167 lb 12.8 oz) 01/16/20 86.7 kg (191 lb 1.6 oz) Ht Readings from Last 3 Encounters:  
01/19/21 5' 5\" (1.651 m)  
03/21/20 5' 5\" (1.651 m)  
01/13/20 5' 5\" (1.651 m) There is no height or weight on file to calculate BMI. There were no vitals taken for this visit. LAB VALUES No results found for this visit on 01/20/21 (from the past 12 hour(s)). No results found for this visit on 21 (from the past 6 hour(s)). Lab Results Component Value Date/Time Protein, total 7.3 2021 07:51 PM  
 Albumin 3.2 (L) 2021 07:51 PM  
 
 
Currently this patient has: 
[x] Supplemental O2 [x] Peripheral IV  [] PICC    [] PORT  
[] Borrego Catheter [] NG Tube   [] PEG Tube [] Ostomy   
[] AICD: Has ICD been deactivated? [] Yes [] No:______ PLAN 1. Admit GIP level of care 2. resp distress: PRN IV dilaudid 0.5mg, continuous 02 
3. Pain:  IV dilaudid 0.5mg 
4. Upper GI Bleed-----suction and towels available should pt vomit blood. Hospice Team Frequency Orders: 
Skilled Nurse -   Daily x 7 days /every other day x 7 days  with 5 PRN visits for symptom control. ДМИТРИЙ  1 visit for initial assessment/evaluation for family support and need for volunteer services. Lev Owens  1 visit for initial assessment/evaluation for spiritual support. ADVANCE CARE PLANNING (Complete in ACP Flow Sheet) Code Status: DNR Durable DNR: [x]  Yes  []  No 
Code Status Discussed/Confirmed: yes Preference for Other Life Sustaining Treatment Discussed/Confirmed:  No life prolonging measures desires Hospitalization Preference: prever to remain at [facility] through end of life Advance Care Planning 3/21/2020 Patient's Healthcare Decision Maker is: - Confirm Advance Directive Yes, on file Does the patient have other document types -  Service: [] Yes  []  No      [] Unknown Appropriate for Pinning Ceremony:  [] Yes     [] No 
Anglican: Restorationist  Home: undecided DISCHARGE PLANNING 1. Discharge Plan:return to facility should pt stabilize 2. Patient/Family teaching: end of life process 3. Response to patient/family teaching: expressed understanding SOCIAL/EMOTIONAL/SPIRITUAL NEEDS Spiritual Issues Identified: hospice chaplain Lianne Riggs in to support family Psych/ Social/ Emotional Issues Identified:  Marguerite Trivedi working with son Mehnaz Ayon Caregiver Support: 
[x] Provided information on End of Life Care  
[] Material Provided: Gone From My Sight or [facility]  
 
CARE COORDINATION Dr. Edyta Barros contacted, discharge to hospice order received Dr. Arron Thapa contacted, agrees to serve as attending provider for hospice and provided verbal certification of terminal illness with life expectancy of 6 months or less. Orders for hospice admission, medications and plan of treatment received. Medication reconciliation completed. MEDS: See medication list below DME: Per hospital 
Supplies: Per hospital 
IDT communication to include MD, , SW, CH and support team 
 
ALLERGIES AND MEDICATIONS Allergies: Allergies Allergen Reactions  Influenza Virus Vaccine, Specific Hives  Penicillins Unknown (comments)  Sulfacetamide Unknown (comments) Current Facility-Administered Medications Medication Dose Route Frequency  LORazepam (ATIVAN) injection 1 mg  1 mg IntraVENous Q15MIN PRN  
 ketorolac (TORADOL) injection 30 mg  30 mg IntraVENous Q8H PRN  
 glycopyrrolate (ROBINUL) injection 0.2 mg  0.2 mg IntraVENous Q4H PRN  
 bisacodyL (DULCOLAX) suppository 10 mg  10 mg Rectal DAILY PRN  
 HYDROmorphone (PF) (DILAUDID) injection 0.5 mg  0.5 mg IntraVENous Q15MIN PRN  
 sodium chloride (NS) flush 5 mL  5 mL IntraVENous PRN Current Outpatient Medications Medication Sig  warfarin (COUMADIN) 2.5 mg tablet Take 2.5 mg by mouth two (2) days a week. Patient takes 5 mg M,W,TH,SAT, SUN and takes 2.5 mg every TUES and FRI  albuterol-ipratropium (DUO-NEB) 2.5 mg-0.5 mg/3 ml nebu 3 mL by Nebulization route every four (4) hours as needed for Wheezing.  zinc oxide (SECURA EXTRA PROTECTIVE) 30.6 % topical cream Apply  to affected area four (4) times daily as needed for Skin Irritation (to right buttock).  insulin lispro (HUMALOG U-100 INSULIN) 100 unit/mL injection 5 Units by SubCUTAneous route three (3) times daily (with meals).  alendronate (FOSAMAX) 70 mg tablet Take 70 mg by mouth every seven (7) days.  pantoprazole (PROTONIX) 40 mg tablet Take 40 mg by mouth daily.  torsemide (DEMADEX) 100 mg tablet Take 100 mg by mouth daily.  acetaminophen (TYLENOL) 325 mg tablet Take 650 mg by mouth every six (6) hours as needed for Pain.  allopurinol (ZYLOPRIM) 100 mg tablet Take 100 mg by mouth daily.  amLODIPine (NORVASC) 10 mg tablet Take 10 mg by mouth daily.  warfarin (COUMADIN) 5 mg tablet Take 5 mg by mouth five (5) days a week. Patient takes 5 mg M,W,TH,SAT, SUN and takes 2.5 mg every TUES and FRI  carvedilol (COREG) 3.125 mg tablet Take 3.125 mg by mouth two (2) times daily (with meals).  ferrous sulfate (IRON) 325 mg (65 mg iron) EC tablet Take 325 mg by mouth Before breakfast and dinner.  benztropine (COGENTIN) 1 mg tablet Take 1 mg by mouth two (2) times a day.  memantine ER (NAMENDA XR) 28 mg capsule Take 28 mg by mouth daily.  potassium chloride (KAON 10%) 20 mEq/15 mL solution Take 20 mEq by mouth daily.  pravastatin (PRAVACHOL) 20 mg tablet Take 20 mg by mouth nightly.

## 2021-01-20 NOTE — PALLIATIVE CARE
Chart reviewed, patient is accepted for GIP level of care under hospice care . I will sign off. Thank you for including Palliative care in the care of this patient .

## 2021-01-20 NOTE — PROGRESS NOTES
Spiritual Care Assessment/Progress Note [address] 
 
 
NAME: Tushar Arreola      MRN: [id number] AGE: 80 y.o. SEX: female Episcopal Affiliation: Cheondoism  
Language: English  
 
1/20/2021     Total Time (in minutes): 15 Spiritual Assessment begun in [facility] EMERGENCY DEPT through conversation with: 
  
    [x]Patient        [x] Family    [] Friend(s) Reason for Consult: Crisis Spiritual beliefs: (Please include comment if needed) [x] Identifies with a lilian tradition:     
   [] Supported by a lilian community:        
   [] Claims no spiritual orientation:       
   [] Seeking spiritual identity:            
   [] Adheres to an individual form of spirituality:       
   [] Not able to assess:                   
 
    
Identified resources for coping:  
   [x] Prayer                           
   [] Music                  [] Guided Imagery [x] Family/friends                 [] Pet visits [] Devotional reading                         [] Unknown 
   [] Other:                                         
 
 
Interventions offered during this visit: (See comments for more details) Patient Interventions: Prayer (actual) Family/Friend(s): Affirmation of emotions/emotional suffering, Affirmation of lilian, Initial Assessment, Life review/legacy, Prayer (actual), Prayer (assurance of) Plan of Care: 
 
 [x] Support spiritual and/or cultural needs  
 [] Support AMD and/or advance care planning process    
 [] Support grieving process 
 [] Coordinate Rites and/or Rituals  
 [] Coordination with community clergy [] No spiritual needs identified at this time 
 [] Detailed Plan of Care below (See Comments)  [] Make referral to Music Therapy 
[] Make referral to Pet Therapy    
[] Make referral to Addiction services 
[] Make referral to [facility] 
[] Make referral to Spiritual Care Partner 
[] No future visits requested       
[x] Follow up upon further referrals Comments: Attempted visit for palliative initial spiritual assessment. Consulted with bedside nurse who offered the medical update on the situation this pt is facing. Pt was laying upright in bed and appeared drowsy and almost asleep, but no signs of discomfort or agitation were witnessed during this visit. Provided pastoral support for this family through active listening as family processed some of their thoughts about pt's situation as they understood it. Cony is important to this family, especially to cope with difficulty. Offered prayer for this pt and family. Assured pt's family of continued prayers and affirmed ongoing availability of support. Savanna Amaya MDiv. Staff  Request  Support/Spiritual Care Services via [facility]

## 2021-01-20 NOTE — ED TRIAGE NOTES
Pt presents via EMS w/ reports of altered mental status. Pt is A&O x0. Pt currently spitting large amounts of oral secretions.

## 2021-01-20 NOTE — PROGRESS NOTES
CM received consult for Hospice referral.  Referral sent to [facility] to evaluate and provide educational session. Hattie Kwan Care Manager - [facility] Advanced Steps ACP Facilitator Zone Phone: [phone]

## 2021-01-20 NOTE — ACP (ADVANCE CARE PLANNING)
Advance Care Planning Note NAME: Kristina Galdamez :  1927 MRN:  [id number] Date/Time:  2021 3:36 AM 
 
Active Diagnoses: 
Hospital Problems  Date Reviewed: 2019 Codes Class Noted POA Hypernatremia ICD-10-CM: E87.0 ICD-9-CM: 276.0  2021 Unknown Acute upper GI bleed ICD-10-CM: K92.2 ICD-9-CM: 578.9  2021 Unknown MIRANDA (acute kidney injury) ([facility] [address] ICD-10-CM: N17.9 ICD-9-CM: 584.9  2021 Unknown Acute encephalopathy ICD-10-CM: G93.40 ICD-9-CM: 348.30  2021 Unknown These active diagnoses are of sufficient risk that focused discussion on advance care planning is indicated in order to allow the patient to thoughtfully consider personal goals of care, and if situations arise that prevent the ability to personally give input, to ensure appropriate representation of their personal desires for different levels and aggressiveness of care. Discussion:  
Code status addressed and wants to be a DNR / DNI. Patient's medical power of  and her son, Yareli Gamez, decided with the rest of his family to change her to DNR with comfort care measures only. Persons present and participating in discussion: Patient's son Johan Disla , Robbie Hernandez MD, Time Spent:  
Total time spent face-to-face in education and discussion:   20  minutes.   
 
 
 
Robbie Hernandez MD  
Hospitalist

## 2021-01-20 NOTE — ED NOTES
Bedside shift change report given to Grazyna Snyder RN (oncoming nurse) by Yanira Mckeon RN (offgoing nurse). Report included the following information SBAR, Kardex, ED Summary, [facility] and Recent Results. Hospitalist states to discontinue rectal temps at this time for pt comfort. Also reports they are not pursuing NG tube at this time. 7:32 AM: Bedside shift change report given to Lucrecia Rossi RN (oncoming nurse) by Grazyna Snyder RN (offgoing nurse). Report included the following information SBAR, Kardex, ED Summary, [facility] and Recent Results.

## 2021-01-20 NOTE — ED NOTES
Multiple failled NG tube attempts, with Dr. Margarette Hughes at bed side. Pt placed on 10L NC by provider at this time for comfort. Per provider d/c NG tube, pt to be comfort care

## 2021-01-20 NOTE — ED PROVIDER NOTES
EMERGENCY DEPARTMENT HISTORY AND PHYSICAL EXAM 
 
 
Date: 1/19/2021 Patient Name: Renae Calvert Patient Age and Sex: 80 y.o. female History of Presenting Illness Chief Complaint Patient presents with  Altered mental status History Provided By: ems HPI: Renae Calvert is a 26-year-old female with a history of Alzheimer's dementia, atrial fibrillation on warfarin, presenting with altered mental status. Patient comes from [facility] where they stated that over the past couple of days patient has been having changes in her mental status. Has been moaning a lot more and less conversive. Normally she is able to say a few things but recently she just been lying there and moaning all the time. She has been following some basic commands. They denied any fevers or shortness of breath but she has been having a cough intermittently. EMS reported that her heart rate, blood pressure and oxygen levels were normal.  Were unable to get a temperature. There are no other complaints, changes, or physical findings at this time. PCP: Deanne Brooks MD 
 
No current facility-administered medications on file prior to encounter. Current Outpatient Medications on File Prior to Encounter Medication Sig Dispense Refill  warfarin (COUMADIN) 2.5 mg tablet Take 2.5 mg by mouth two (2) days a week. Patient takes 5 mg M,W,TH,SAT, SUN and takes 2.5 mg every TUES and FRI  albuterol-ipratropium (DUO-NEB) 2.5 mg-0.5 mg/3 ml nebu 3 mL by Nebulization route every four (4) hours as needed for Wheezing.  zinc oxide (SECURA EXTRA PROTECTIVE) 30.6 % topical cream Apply  to affected area four (4) times daily as needed for Skin Irritation (to right buttock).  insulin lispro (HUMALOG U-100 INSULIN) 100 unit/mL injection 5 Units by SubCUTAneous route three (3) times daily (with meals).  alendronate (FOSAMAX) 70 mg tablet Take 70 mg by mouth every seven (7) days.  pantoprazole (PROTONIX) 40 mg tablet Take 40 mg by mouth daily.  torsemide (DEMADEX) 100 mg tablet Take 100 mg by mouth daily.  acetaminophen (TYLENOL) 325 mg tablet Take 650 mg by mouth every six (6) hours as needed for Pain.  allopurinol (ZYLOPRIM) 100 mg tablet Take 100 mg by mouth daily.  amLODIPine (NORVASC) 10 mg tablet Take 10 mg by mouth daily.  warfarin (COUMADIN) 5 mg tablet Take 5 mg by mouth five (5) days a week. Patient takes 5 mg M,W,TH,SAT, SUN and takes 2.5 mg every TUES and FRI  carvedilol (COREG) 3.125 mg tablet Take 3.125 mg by mouth two (2) times daily (with meals).  ferrous sulfate (IRON) 325 mg (65 mg iron) EC tablet Take 325 mg by mouth Before breakfast and dinner.  benztropine (COGENTIN) 1 mg tablet Take 1 mg by mouth two (2) times a day.  memantine ER (NAMENDA XR) 28 mg capsule Take 28 mg by mouth daily.  potassium chloride (KAON 10%) 20 mEq/15 mL solution Take 20 mEq by mouth daily.  pravastatin (PRAVACHOL) 20 mg tablet Take 20 mg by mouth nightly. Past History Past Medical History: 
Past Medical History:  
Diagnosis Date  Alzheimer's disease ([facility] [address]  CHF (congestive heart failure) ([facility] [address] [facility]  Dementia ([facility] [address] [facility]  Diabetes ([facility] [address] type II  
 Elevated troponin 12/11/2015  Essential hypertension  Fall as cause of accidental injury in residential institution as place of occurrence 12/11/2015  GERD (gastroesophageal reflux disease)  Heart failure ([facility] [address]  Hyperlipidemia  Hyperlipidemia  Hypoglycemia  Ill-defined condition   
 embolism (DVT)  Paroxysmal atrial fibrillation (HCC)  Psychiatric disorder   
 anxiety Past Surgical History: 
Past Surgical History:  
Procedure Laterality Date  HX APPENDECTOMY Family History: No family history on file. Social History: 
Social History Tobacco Use  Smoking status: Never Smoker  Smokeless tobacco: Never Used Substance Use Topics  Alcohol use: No  
 Drug use: No  
 
 
Allergies: Allergies Allergen Reactions  Influenza Virus Vaccine, Specific Hives  Penicillins Unknown (comments)  Sulfacetamide Unknown (comments) Review of Systems Review of Systems Unable to perform ROS: Mental status change Physical Exam  
Physical Exam 
Constitutional:   
   General: She is not in acute distress. Appearance: She is well-developed. Comments: Patient is alert and constantly moaning HENT:  
   Head: Normocephalic and atraumatic. Nose: Nose normal.  
   Mouth/Throat:  
   Mouth: Mucous membranes are moist.  
Eyes:  
   Extraocular Movements: Extraocular movements intact. Conjunctiva/sclera: Conjunctivae normal.  
Neck: Musculoskeletal: Normal range of motion. Cardiovascular:  
   Comments: Well perfused Pulmonary:  
   Effort: Pulmonary effort is normal. No respiratory distress. Musculoskeletal: Normal range of motion. Neurological:  
   General: No focal deficit present. Mental Status: She is alert. Comments: Patient is alert, moaning, however she is able to follow my basic commands. She is squeezing my fingers bilaterally and has 5 out of 5 strength in her bilateral legs. She is looking to the left and the right when I command her to. When asking her question she is trying to say words but they all come out a little slurred which EMS states that she does have some baseline slurred speech. Psychiatric:     
   Mood and Affect: Mood normal.  
 
  
 
Diagnostic Study Results Labs - Recent Results (from the past 12 hour(s)) CBC WITH AUTOMATED DIFF Collection Time: 01/19/21  7:51 PM  
Result Value Ref Range WBC 2.6 (L) 3.6 - 11.0 K/uL  
 RBC 4.43 3.80 - 5.20 M/uL  
 HGB 10.2 (L) 11.5 - 16.0 g/dL HCT 33.0 (L) 35.0 - 47.0 % MCV 74.5 (L) 80.0 - 99.0 FL  
 MCH 23.0 (L) 26.0 - 34.0 PG  
 MCHC 30.9 30.0 - 36.5 g/dL  
 RDW 17.4 (H) 11.5 - 14.5 % PLATELET 66 (L) 531 - 400 K/uL MPV ABNORMAL 8.9 - 12.9 FL  
 NRBC 4.3 (H) 0  WBC ABSOLUTE NRBC 0.11 (H) 0.00 - 0.01 K/uL NEUTROPHILS 56 32 - 75 % LYMPHOCYTES 28 12 - 49 % MONOCYTES 13 5 - 13 % EOSINOPHILS 3 0 - 7 % BASOPHILS 0 0 - 1 % IMMATURE GRANULOCYTES 0 0.0 - 0.5 % ABS. NEUTROPHILS 1.5 (L) 1.8 - 8.0 K/UL  
 ABS. LYMPHOCYTES 0.7 (L) 0.8 - 3.5 K/UL  
 ABS. MONOCYTES 0.3 0.0 - 1.0 K/UL  
 ABS. EOSINOPHILS 0.1 0.0 - 0.4 K/UL  
 ABS. BASOPHILS 0.0 0.0 - 0.1 K/UL  
 ABS. IMM. GRANS. 0.0 0.00 - 0.04 K/UL  
 DF SMEAR SCANNED    
 RBC COMMENTS TARGET CELLS 1+ RBC COMMENTS MICROCYTOSIS 1+ 
    
 RBC COMMENTS OVALOCYTES PRESENT 
    
METABOLIC PANEL, COMPREHENSIVE Collection Time: 01/19/21  7:51 PM  
Result Value Ref Range Sodium 149 (H) 136 - 145 mmol/L Potassium 4.9 3.5 - 5.1 mmol/L Chloride 119 (H) 97 - 108 mmol/L  
 CO2 27 21 - 32 mmol/L Anion gap 3 (L) 5 - 15 mmol/L Glucose 149 (H) 65 - 100 mg/dL BUN 71 (H) 6 - 20 MG/DL Creatinine 2.29 (H) 0.55 - 1.02 MG/DL  
 BUN/Creatinine ratio 31 (H) 12 - 20 GFR est AA 24 (L) >60 ml/min/1.73m2 GFR est non-AA 20 (L) >60 ml/min/1.73m2 Calcium 8.8 8.5 - 10.1 MG/DL Bilirubin, total 0.2 0.2 - 1.0 MG/DL  
 ALT (SGPT) 126 (H) 12 - 78 U/L  
 AST (SGOT) 58 (H) 15 - 37 U/L Alk. phosphatase 173 (H) 45 - 117 U/L Protein, total 7.3 6.4 - 8.2 g/dL Albumin 3.2 (L) 3.5 - 5.0 g/dL Globulin 4.1 (H) 2.0 - 4.0 g/dL A-G Ratio 0.8 (L) 1.1 - 2.2 URINALYSIS W/ REFLEX CULTURE Collection Time: 01/19/21  7:51 PM  
 Specimen: Urine Result Value Ref Range Color YELLOW/STRAW Appearance CLEAR CLEAR Specific gravity 1.013 1.003 - 1.030    
 pH (UA) 5.0 5.0 - 8.0 Protein 30 (A) NEG mg/dL Glucose Negative NEG mg/dL Ketone Negative NEG mg/dL Bilirubin Negative NEG Blood Negative NEG Urobilinogen 0.2 0.2 - 1.0 EU/dL  Nitrites Negative NEG    
 Leukocyte Esterase Negative NEG    
 WBC 0-4 0 - 4 /hpf  
 RBC 0-5 0 - 5 /hpf Epithelial cells FEW FEW /lpf Bacteria Negative NEG /hpf  
 UA:UC IF INDICATED CULTURE NOT INDICATED BY UA RESULT CNI Hyaline cast 2-5 0 - 5 /lpf DRUG SCREEN, URINE Collection Time: 01/19/21  7:51 PM  
Result Value Ref Range AMPHETAMINES Negative NEG    
 BARBITURATES Negative NEG BENZODIAZEPINES Negative NEG    
 COCAINE Negative NEG METHADONE Negative NEG    
 OPIATES Negative NEG    
 PCP(PHENCYCLIDINE) Negative NEG    
 THC (TH-CANNABINOL) Negative NEG Drug screen comment (NOTE) ACETAMINOPHEN Collection Time: 01/19/21  7:51 PM  
Result Value Ref Range Acetaminophen level <2 (L) 10 - 30 ug/mL ETHYL ALCOHOL Collection Time: 01/19/21  7:51 PM  
Result Value Ref Range ALCOHOL(ETHYL),SERUM <76 <12 MG/DL  
SALICYLATE Collection Time: 01/19/21  7:51 PM  
Result Value Ref Range Salicylate level <5.9 (L) 2.8 - 20.0 MG/DL  
AMMONIA Collection Time: 01/19/21  7:51 PM  
Result Value Ref Range Ammonia 10 <32 UMOL/L  
POC LACTIC ACID Collection Time: 01/19/21  8:23 PM  
Result Value Ref Range Lactic Acid (POC) <0.30 (L) 0.40 - 2.00 mmol/L Radiologic Studies -  
CT ABD PELV WO CONT Final Result 1. Moderate right and small left pleural effusions. 2. Dependent atelectasis versus consolidation in basilar lower lobes  
bilaterally. 3. Diffuse transaxial enlargement of the esophagus which is not otherwise  
characterized. 4. Moderate cardiomegaly. 5. Cystic mass lesion of the inferior aspect of the body of the pancreas  
measuring 4.6 x 2 x 4.4 cm in size. 6. Small-moderate hiatal hernia. 7. Numerous calcified uterine leiomyomas. CT CHEST WO CONT Final Result 1. Moderate right and small left pleural effusions. 2. Dependent atelectasis versus consolidation in basilar lower lobes  
bilaterally. 3. Diffuse transaxial enlargement of the esophagus which is not otherwise  
characterized. 4. Moderate cardiomegaly. 5. Cystic mass lesion of the inferior aspect of the body of the pancreas  
measuring 4.6 x 2 x 4.4 cm in size. 6. Small-moderate hiatal hernia. 7. Numerous calcified uterine leiomyomas. XR CHEST PORT Final Result 1. Cardiomegaly without pulmonary edema. 2. Pleural effusions. 3. No acute airspace disease. CT HEAD WO CONT Final Result No apparent acute intracranial finding. CT Results  (Last 48 hours) 01/19/21 2214  CT ABD PELV WO CONT Final result Impression: 1. Moderate right and small left pleural effusions. 2. Dependent atelectasis versus consolidation in basilar lower lobes  
bilaterally. 3. Diffuse transaxial enlargement of the esophagus which is not otherwise  
characterized. 4. Moderate cardiomegaly. 5. Cystic mass lesion of the inferior aspect of the body of the pancreas  
measuring 4.6 x 2 x 4.4 cm in size. 6. Small-moderate hiatal hernia. 7. Numerous calcified uterine leiomyomas. Narrative:  INDICATION: Aspiration COMPARISON: Earlier chest x-ray CONTRAST: None. TECHNIQUE:  5 mm axial images were obtained through the chest, abdomen and  
pelvis. No oral contrast or IV contrast was administered. Coronal and sagittal  
reformats were generated. CT dose reduction was achieved through use of a  
standardized protocol tailored for this examination and automatic exposure  
control for dose modulation. The absence of intravenous and oral contrast reduces the capacity of CT to  
evaluate the bowel, mediastinum, viet, vasculature, and abdominal organs. FINDINGS:  
   
CHEST WALL: No mass or axillary lymphadenopathy. THYROID: No nodule. MEDIASTINUM: Small right paratracheal, aorticopulmonary and subcarinal lymph  
nodes. VIET: No mass or lymphadenopathy. THORACIC AORTA: Mild diffuse ectasia with maximal transverse dimension in the  
proximal ascending aorta, measuring 4.2 cm. Diffuse atherosclerotic  
calcifications. MAIN PULMONARY ARTERY: Normal in caliber. TRACHEA/BRONCHI: AP narrowing of the trachea demonstrated diffusely due to  
enlarged esophagus as noted below. ESOPHAGUS: The esophagus is enlarged in diameter along its entire course,  
measuring up to 3 cm in diameter. An air-fluid level is shown at and above the  
level of the radha. Internal character of the dilated esophagus is not  
determined on these images. HEART: Moderate cardiac contour enlargement. No pericardial effusion. Extensive  
vascular calcifications of LAD, first diagonal, left circumflex and right  
coronary arteries. PLEURA: Moderate right and small left pleural effusions. LUNGS: Dependent atelectasis versus consolidation in the right greater than left  
basilar lower lobes. Liver: No mass or biliary ductal dilation. Gallbladder: Mildly distended. No gallstone or gallbladder wall thickening, or  
pericholecystic fluid, demonstrated. No intrahepatic or extrahepatic bile duct  
dilation. Spleen: Normal.  
Pancreas: Cystic mass lesion of the inferior aspect of the body of the pancreas. This is internal to the curve of the duodenum and measures up to 4.6 cm  
transverse, 2 cm AP and 4.4 cm craniocaudal.  
Adrenal glands: Within normal limits. Kidneys: No mass, hydronephrosis or calculus. Retroperitoneum: Abundant atherosclerotic calcifications. No aneurysm. No  
retroperitoneal adenopathy. Bowel: Small-moderate hiatal hernia. No bowel dilation or mural thickening  
evident. Urinary bladder: Diffuse mural thickening of urinary bladder, significance  
uncertain. Reproductive organs: Multiple calcified masses of enlarged uterus measuring up  
to 4 cm, consistent with calcified leiomyomas. Peritoneum: No free peroneal air or fluid. Abdominal wall: No hernia or mass demonstrated. BONES: Moderate to severe diffuse osteopenia. No vertebral compression fracture. No destructive bone lesion demonstrated. Internal fixation in the visualized  
right femur. 01/19/21 2214  CT CHEST WO CONT Final result Impression: 1. Moderate right and small left pleural effusions. 2. Dependent atelectasis versus consolidation in basilar lower lobes  
bilaterally. 3. Diffuse transaxial enlargement of the esophagus which is not otherwise  
characterized. 4. Moderate cardiomegaly. 5. Cystic mass lesion of the inferior aspect of the body of the pancreas  
measuring 4.6 x 2 x 4.4 cm in size. 6. Small-moderate hiatal hernia. 7. Numerous calcified uterine leiomyomas. Narrative:  INDICATION: Aspiration COMPARISON: Earlier chest x-ray CONTRAST: None. TECHNIQUE:  5 mm axial images were obtained through the chest, abdomen and  
pelvis. No oral contrast or IV contrast was administered. Coronal and sagittal  
reformats were generated. CT dose reduction was achieved through use of a  
standardized protocol tailored for this examination and automatic exposure  
control for dose modulation. The absence of intravenous and oral contrast reduces the capacity of CT to  
evaluate the bowel, mediastinum, viet, vasculature, and abdominal organs. FINDINGS:  
   
CHEST WALL: No mass or axillary lymphadenopathy. THYROID: No nodule. MEDIASTINUM: Small right paratracheal, aorticopulmonary and subcarinal lymph  
nodes. VIET: No mass or lymphadenopathy. THORACIC AORTA: Mild diffuse ectasia with maximal transverse dimension in the  
proximal ascending aorta, measuring 4.2 cm. Diffuse atherosclerotic  
calcifications. MAIN PULMONARY ARTERY: Normal in caliber. TRACHEA/BRONCHI: AP narrowing of the trachea demonstrated diffusely due to  
enlarged esophagus as noted below. ESOPHAGUS: The esophagus is enlarged in diameter along its entire course,  
measuring up to 3 cm in diameter. An air-fluid level is shown at and above the  
level of the radha. Internal character of the dilated esophagus is not  
determined on these images. HEART: Moderate cardiac contour enlargement. No pericardial effusion. Extensive  
vascular calcifications of LAD, first diagonal, left circumflex and right  
coronary arteries. PLEURA: Moderate right and small left pleural effusions. LUNGS: Dependent atelectasis versus consolidation in the right greater than left  
basilar lower lobes. Liver: No mass or biliary ductal dilation. Gallbladder: Mildly distended. No gallstone or gallbladder wall thickening, or  
pericholecystic fluid, demonstrated. No intrahepatic or extrahepatic bile duct  
dilation. Spleen: Normal.  
Pancreas: Cystic mass lesion of the inferior aspect of the body of the pancreas. This is internal to the curve of the duodenum and measures up to 4.6 cm  
transverse, 2 cm AP and 4.4 cm craniocaudal.  
Adrenal glands: Within normal limits. Kidneys: No mass, hydronephrosis or calculus. Retroperitoneum: Abundant atherosclerotic calcifications. No aneurysm. No  
retroperitoneal adenopathy. Bowel: Small-moderate hiatal hernia. No bowel dilation or mural thickening  
evident. Urinary bladder: Diffuse mural thickening of urinary bladder, significance  
uncertain. Reproductive organs: Multiple calcified masses of enlarged uterus measuring up  
to 4 cm, consistent with calcified leiomyomas. Peritoneum: No free peroneal air or fluid. Abdominal wall: No hernia or mass demonstrated. BONES: Moderate to severe diffuse osteopenia. No vertebral compression fracture. No destructive bone lesion demonstrated. Internal fixation in the visualized  
right femur. 01/19/21 2049  CT HEAD WO CONT Final result Impression:  No apparent acute intracranial finding. Narrative:  EXAM: Head CT without Contrast, 2039 hours. COMPARISON: 1014 hours. TECHNIQUE: Unenhanced CT Head is performed. CT dose reduction was achieved  
through use of a standardized protocol tailored for this examination and  
automatic exposure control for dose modulation. INDICATION:  ams FINDINGS: There is no apparent mass, and no bleed, shift, obstructive  
hydrocephalus or significant extra-axial fluid collection. Regions of extensive  
parenchymal hypodensity are unchanged, nonspecific, but favoring chronic  
microangiopathy. Bone windows are unremarkable. 01/19/21 1012  CT HEAD WO CONT Final result Impression:  Moderate presumed small vessel ischemic disease and central atrophy No acute intracranial process seen Narrative:  EXAM: CT HEAD WO CONT INDICATION: Acute encephalopathy. Fall from a ground-level. Altered mental  
status. COMPARISON: 3/20/2020. CONTRAST: None. TECHNIQUE: Unenhanced CT of the head was performed using 5 mm images. Brain and  
bone windows were generated. Coronal and sagittal reformats. CT dose reduction  
was achieved through use of a standardized protocol tailored for this  
examination and automatic exposure control for dose modulation. FINDINGS:  
The ventricles and sulci are prominent but stable in size, shape and  
configuration. . There is moderate periventricular white matter hypodensity. There is no intracranial hemorrhage, extra-axial collection, or mass effect. The  
basilar cisterns are open. No CT evidence of acute infarct. Left vertebral  
calcification is seen. The bone windows demonstrate no abnormalities. The visualized portions of the  
paranasal sinuses and mastoid air cells are clear. CXR Results  (Last 48 hours) 01/19/21 2109  XR CHEST PORT Final result Impression:  1. Cardiomegaly without pulmonary edema. 2. Pleural effusions. 3. No acute airspace disease. Narrative:  EXAM: Portable CXR. 2056 hours. COMPARISON: 3/22/2020 INDICATION: ams cough FINDINGS:  
Heart remains large. There is no overt pulmonary edema. There are new small  
pleural effusions. There is no apparent airspace disease. There is no  
pneumothorax or midline shift. Medical Decision Making I am the first provider for this patient. I reviewed the vital signs, available nursing notes, past medical history, past surgical history, family history and social history. Vital Signs-Reviewed the patient's vital signs. Patient Vitals for the past 12 hrs: 
 Temp Pulse Resp BP SpO2  
01/19/21 2203 (!) 91.3 °F (32.9 °C)      
01/19/21 2100  (!) 57 16 (!) 104/53 94 % 01/19/21 2035  60 11 (!) 112/52 93 % 01/19/21 2021 (!) 92 °F (33.3 °C) (!) 47 18 122/74 94 % 01/19/21 2000  68 14 110/60 96 % Records Reviewed: Nursing Notes and Old Medical Records Provider Notes (Medical Decision Making):  
Patient presenting with altered mental status. DDx: medication toxicity, infection, anemia, electrolyte/metabolic anomoly, hypercapnea, stroke/bleed/mass, dehydration, illicit drug intoxication. Will obtain labwork, UA, EKG and imaging. ED Course:  
Initial assessment performed. The patients presenting problems have been discussed, and they are in agreement with the care plan formulated and outlined with them. I have encouraged them to ask questions as they arise throughout their visit. ED Course as of Jan 20 0017 Tue Jan 19, 2021  
2111 [facility] ED SEPSIS NOTE:  
 
9:11 PM The patient now meets criteria for: Severe Sepsis Fluid resuscitation with: Patient does not require a 30cc/kg bolus because they do not meet criteria for septic shock (SBP<90 or decreased by >40 mmHG from baseline, MAP<65, Lactate 4 or greater). Due to concern for rapidly advancing infection and deterioration of patient's condition, antibiotics are started STAT and cultures ordered. ---------------------------------------------------------------------------------  
 [JS] 2144 Nurse made me aware that patient continues to aspirate some of the secretions that she keeps spitting up as well as vomiting up. Given this, will get CT of her chest and abdomen without contrast given her worsening MIRANDA. I did confirm that she is DNR. [JS] 2210 Patient continues to be hypothermic. Nurse just made me aware that patient's blood pressures are also downtrending so we will order a liter of normal saline. [JS] 2244 Patient CT showed that her esophagus was dilated and fluid-filled which makes sense given how much secretions and vomitus she keeps on vomiting up. No signs of bowel obstruction however. There were concerns for possible pneumonia so will rapid swab her again though she did have Covid back in August.  GI paged. [JS] 18 Spoke with Dr. Renae Sears who recommended placing NG tube to suction. States that even if we cannot get the NG tube past the GE junction, that keeping it in the esophagus would at least help suction out all of the fluid that is in the esophagus. They will decide on whether she is stable enough for EGD tomorrow. [JS] 2323 Attempted to place NG tube but caused some nasal trauma and because patient is on warfarin now having slight nosebleed and spitting up the blood. Continuing to vomit. Hard to pass the NG tube given that patient unable to swallow the tube. Shay Long, ICU NP at bedside. Recommended some Xylocaine so we will attempt to see if that helps. [JS] 2359 Unable to place NGT. She continues to be gurgling. Spoke with GI again who states that patient would need intubation for EGD. Has DNR and DNR. SPoke with Valeriy Mc, son, who will speak with sister to decide if we should be aggressive or comfort measures at this time. [JS] Wed Jan 20, 2021 0009 Spoke with Nathan romero, son again who spoke with family and wanted that we keep her comfortable. No aggressive measures. Another son lives in [place] and will come to keep her company. [JS] ED Course User Index [JS] Eh Coffey MD  
 
Critical Care Time: CRITICAL CARE NOTE : 
 
7:43 PM 
 
IMPENDING DETERIORATION -Airway, Respiratory, Cardiovascular, Metabolic and Renal 
ASSOCIATED RISK FACTORS - Hypotension, Shock and Metabolic changes MANAGEMENT- Bedside Assessment and Supervision of Care INTERPRETATION -  Xrays, CT Scan, ECG, Blood Pressure and Cardiac Output Measures INTERVENTIONS - hemodynamic mngmt and gastric tube CASE REVIEW - Hospitalist, Medical Sub-Specialist and Nursing TREATMENT RESPONSE -Stable PERFORMED BY - Self NOTES   : 
 
I have spent 100 minutes of critical care time involved in lab review, consultations with specialist, family decision- making, bedside attention and documentation. During this entire length of time I was immediately available to the patient . Disposition: 
 
Admission Note: 
Patient is being admitted to the hospital by Dr Phoebe Ya  The results of their tests and reasons for their admission have been discussed with them and available family. They convey agreement and understanding for the need to be admitted and for their admission diagnosis. Diagnosis Clinical Impression: 1. Hypothermia, initial encounter 2. Pharyngoesophageal dysphagia 3. Intractable vomiting with nausea, unspecified vomiting type 4. Acute renal failure, unspecified acute renal failure type ([facility] [address] Attestations: 
Moise Ross M.D. 
 
 
 
 Please note that this dictation was completed with [employer], the computer voice recognition software. Quite often unanticipated grammatical, syntax, homophones, and other interpretive errors are inadvertently transcribed by the computer software. Please disregard these errors. Please excuse any errors that have escaped final proofreading. Thank you.

## 2021-01-22 NOTE — HOSPICE
[facility] LCSW notes;    LCSW called pts son DOn to offer condolences and support. LCSW left vm for son to return my call with any needs.

## 2021-01-24 LAB
BACTERIA SPEC CULT: NORMAL
SERVICE CMNT-IMP: NORMAL

## 2021-06-22 NOTE — CONSULTS
GI CONSULTATION NOTE Cecile Rick NP 
[phone] NP in-hospital cell phone M-F until 4:30 After 5pm or on weekends, please call  for physician on call NAME: Elyce Curling :  [id number] MRN:  [id number] Attending: Dr Shantel Galdamez Date/Time:  2021 8:54 AM 
Assessment/Plan: We received consult for fluid filled esophagus but on chart review this AM, pt is now going comfort care and a referral for Hospice is in place. Thus, we will not complete this consult. If anything changes and further assistance is needed, please feel free to contact us again, even directly with phone number listed above. Thank you. 
___________________________________________________ Consulting Provider: Cecile Rick NP and Dr. Anival Corcoran    2021  8:54 AM 
 
 22-Jun-2021 09:13

## (undated) DEVICE — MEDI-TRACE CADENCE ADULT, DEFIBRILLATION ELECTRODE -RTS  (10 PR/PK) - PHILIPS: Brand: MEDI-TRACE CADENCE

## (undated) DEVICE — SUTURE VCRL SZ 1 L36IN ABSRB UD L36MM CT-1 1/2 CIR J947H

## (undated) DEVICE — KIT POS FOAM HANA TBL

## (undated) DEVICE — SOLUTION IV 1000ML 0.9% SOD CHL

## (undated) DEVICE — DRAPE C-ARMOUR C-ARM KIT --

## (undated) DEVICE — 3M™ TEGADERM™ TRANSPARENT FILM DRESSING FRAME STYLE, 1627, 4 IN X 10 IN (10 CM X 25 CM), 20/CT 4CT/CASE: Brand: 3M™ TEGADERM™

## (undated) DEVICE — HANDLE LT SNAP ON ULT DURABLE LENS FOR TRUMPF ALC DISPOSABLE

## (undated) DEVICE — WATERPROOF, BACTERIA PROOF DRESSING WITH ABSORBENT SEE THROUGH PAD: Brand: OPSITE POST-OP VISIBLE 10X8CM CTN 20

## (undated) DEVICE — SLIM BODY SKIN STAPLER: Brand: APPOSE ULC

## (undated) DEVICE — STERILE POLYISOPRENE POWDER-FREE SURGICAL GLOVES: Brand: PROTEXIS

## (undated) DEVICE — INFECTION CONTROL KIT SYS

## (undated) DEVICE — SYR LR LCK 1ML GRAD NSAF 30ML --

## (undated) DEVICE — GARMENT,MEDLINE,DVT,INT,CALF,MED, GEN2: Brand: MEDLINE

## (undated) DEVICE — Device

## (undated) DEVICE — BIT DRL L330MM DIA4.2MM CALIB 100MM 3 FLUT QUIK CPL

## (undated) DEVICE — SUTURE VCRL SZ 2-0 L36IN ABSRB UD L36MM CT-1 1/2 CIR J945H

## (undated) DEVICE — STERILE POLYISOPRENE POWDER-FREE SURGICAL GLOVES WITH EMOLLIENT COATING: Brand: PROTEXIS

## (undated) DEVICE — (D)PREP SKN CHLRAPRP APPL 26ML -- CONVERT TO ITEM 371833

## (undated) DEVICE — NEEDLE HYPO 18GA L1.5IN PNK S STL HUB POLYPR SHLD REG BVL

## (undated) DEVICE — SCREW BNE L40MM DIA5MM TIB LT GRN TI ST CANN LOK FULL THRD
Type: IMPLANTABLE DEVICE | Site: FEMUR | Status: NON-FUNCTIONAL
Removed: 2019-09-17

## (undated) DEVICE — 3M™ TEGADERM™ TRANSPARENT FILM DRESSING FRAME STYLE, 1626W, 4 IN X 4-3/4 IN (10 CM X 12 CM), 50/CT 4CT/CASE: Brand: 3M™ TEGADERM™

## (undated) DEVICE — 3.2MM GUIDE WIRE 400MM